# Patient Record
Sex: FEMALE | Race: BLACK OR AFRICAN AMERICAN | Employment: FULL TIME | URBAN - METROPOLITAN AREA
[De-identification: names, ages, dates, MRNs, and addresses within clinical notes are randomized per-mention and may not be internally consistent; named-entity substitution may affect disease eponyms.]

---

## 2017-10-16 PROBLEM — R07.9 CHEST PAIN: Status: ACTIVE | Noted: 2017-10-16

## 2017-10-16 PROBLEM — R07.9 CHEST PAIN: Status: RESOLVED | Noted: 2017-10-16 | Resolved: 2017-10-16

## 2017-10-17 PROBLEM — R00.2 PALPITATIONS: Status: ACTIVE | Noted: 2017-10-17

## 2017-11-06 PROBLEM — I49.3 SYMPTOMATIC PVCS: Status: ACTIVE | Noted: 2017-11-06

## 2017-11-06 PROBLEM — R00.2 PALPITATIONS: Status: RESOLVED | Noted: 2017-10-17 | Resolved: 2017-11-06

## 2018-05-03 ENCOUNTER — HOSPITAL ENCOUNTER (OUTPATIENT)
Dept: CT IMAGING | Age: 23
Discharge: HOME | End: 2018-05-03
Attending: FAMILY MEDICINE
Payer: COMMERCIAL

## 2018-05-03 DIAGNOSIS — R07.9 CHEST PAIN, UNSPECIFIED TYPE: ICD-10-CM

## 2018-05-03 DIAGNOSIS — R79.89 D-DIMER, ELEVATED: ICD-10-CM

## 2018-05-03 PROCEDURE — 71260 CT THORAX DX C+: CPT

## 2018-05-03 PROCEDURE — 74011636320 HC RX REV CODE- 636/320: Performed by: FAMILY MEDICINE

## 2018-05-03 PROCEDURE — 74011000258 HC RX REV CODE- 258: Performed by: FAMILY MEDICINE

## 2018-05-03 RX ORDER — SODIUM CHLORIDE 0.9 % (FLUSH) 0.9 %
10 SYRINGE (ML) INJECTION
Status: COMPLETED | OUTPATIENT
Start: 2018-05-03 | End: 2018-05-03

## 2018-05-03 RX ADMIN — Medication 10 ML: at 10:42

## 2018-05-03 RX ADMIN — IOPAMIDOL 100 ML: 755 INJECTION, SOLUTION INTRAVENOUS at 10:41

## 2018-05-03 RX ADMIN — SODIUM CHLORIDE 100 ML: 900 INJECTION, SOLUTION INTRAVENOUS at 10:41

## 2018-05-04 PROBLEM — R07.9 CHEST PAIN AT REST: Status: ACTIVE | Noted: 2018-05-04

## 2019-02-20 ENCOUNTER — HOSPITAL ENCOUNTER (OUTPATIENT)
Dept: PHYSICAL THERAPY | Age: 24
Discharge: HOME | End: 2019-02-20
Attending: FAMILY MEDICINE
Payer: COMMERCIAL

## 2019-02-20 DIAGNOSIS — S13.4XXD WHIPLASH INJURY TO NECK, SUBSEQUENT ENCOUNTER: ICD-10-CM

## 2019-02-20 PROCEDURE — 97162 PT EVAL MOD COMPLEX 30 MIN: CPT

## 2019-02-20 PROCEDURE — 97110 THERAPEUTIC EXERCISES: CPT
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
## 2019-02-25 ENCOUNTER — HOSPITAL ENCOUNTER (OUTPATIENT)
Dept: PHYSICAL THERAPY | Age: 24
Discharge: HOME | End: 2019-02-25
Attending: FAMILY MEDICINE
Payer: COMMERCIAL

## 2019-02-25 PROCEDURE — 97140 MANUAL THERAPY 1/> REGIONS: CPT

## 2019-02-25 PROCEDURE — 97110 THERAPEUTIC EXERCISES: CPT

## 2019-02-25 NOTE — PROGRESS NOTES
Chato Orozco  : 1995  Payor: Seda Hernandez / Plan: [facility] / Product Type: PPO /  [address]. [address] [place] [address], [facility], [address] [place] [address] Road  [phone]   [phone]          OUTPATIENT PHYSICAL THERAPY: Daily Treatment Note 2019 Visit Count:  2    Pre-treatment Symptoms/Complaints:  Neck feels much better doing ex at home and they are helping. Pain: Initial:   4/10 Post Session:  3/10   Medications Last Reviewed:  See initial evaluation  Updated Objective Findings:  AROM: cervical flex/ext 70/60, RSB/LSB 40/50     TREATMENT:   THERAPEUTIC EXERCISE: (30 minutes):  Exercises per grid below to improve mobility, strength and balance. Required minimal visual, verbal and manual cues to promote proper body alignment and promote proper body posture. Progressed resistance and complexity of movement as indicated. Date:  19 Date:  19 Date:     Activity/Exercise Parameters Parameters Parameters   Patient education Treatment rational, expectations, HEP Progression of HEP    Supine ball AROM rot/flex/ext 20 x ea 20 x ea    Supine AROM with laser feed back Rotation, flex/ext 10 ea In standing with maps on wall 10 min    Seated rot/lateral bending/ shrugs HEP Shrugs 10 x 3#, lateral bending 10 x, flex ext 10 x    Lower trunk rotation  20 x    Single knee to chest  5 x ea    Quadriped rocking  20 x    Cat camel   10 x    Seated lumbar flexion chair stretch  10 x                MANUAL THERAPY: (15 minutes): Joint mobilization was utilized and necessary because of the patient's restricted joint motion. Including manual traction, SOR, lateral glides to cervical spine grade 3. [employer] Portal  Treatment/Session Summary:    · Response to Treatment:  no increase in pain.   · Communication/Consultation:  progression of HEP  · Equipment provided today:  None today  Recommendations/Intent for next treatment session: Next visit will focus on active range of motion. Treatment Plan of Care Effective Dates: 2/25/2019   TO 3/22/2019 (30 days).   Frequency/Duration: 2 times a week for 30 Days  Total Treatment Billable Duration:  40  PT Patient Time In/Time Out  Time In: 1306  Time Out: [address], RT    Future Appointments   Date Time Provider Shukri Perez   2/27/2019 11:15 AM Sandeep Ellington, RT [facility]   3/4/2019 10:15 AM Sandeep Ellington, RT [facility]   3/6/2019 10:30 AM PapSandeep barrios, RT [facility]   3/11/2019 10:15 AM Sandeep Ellington, RT [facility]   3/13/2019 10:30 AM Sandeep Ellington, RT [facility]   3/18/2019 10:30 AM Sandeep Ellington, RT [facility]   3/20/2019 10:30 AM Sandeep Ellington, RT [facility]   3/25/2019 10:30 AM Beverly Ellington, RT [facility]   3/27/2019 10:30 AM Sandeep Ellington, RT [facility]

## 2019-02-27 ENCOUNTER — APPOINTMENT (OUTPATIENT)
Dept: PHYSICAL THERAPY | Age: 24
End: 2019-02-27
Attending: FAMILY MEDICINE
Payer: COMMERCIAL

## 2019-03-04 ENCOUNTER — HOSPITAL ENCOUNTER (OUTPATIENT)
Dept: PHYSICAL THERAPY | Age: 24
Discharge: HOME | End: 2019-03-04
Attending: FAMILY MEDICINE
Payer: COMMERCIAL

## 2019-03-04 PROCEDURE — 97110 THERAPEUTIC EXERCISES: CPT

## 2019-03-04 PROCEDURE — 97140 MANUAL THERAPY 1/> REGIONS: CPT

## 2019-03-04 NOTE — PROGRESS NOTES
Reilly French  : 1995  Payor: Zuly Hitchcock / Plan: [facility] / Product Type: PPO /  [address], [place], [address] [place] [address]  [phone]   [phone]          OUTPATIENT PHYSICAL THERAPY: Daily Treatment Note 3/4/2019 Visit Count:  3    Pre-treatment Symptoms/Complaints:has had a headache for a week, cant think what makes it worse  Pain: Initial:   5/10 Post Session:  3/10   Medications Last Reviewed:  See initial evaluation  Updated Objective Findings:  AROM: cervical flex/ext 65/57, RSB/LSB 50/50     TREATMENT:   THERAPEUTIC EXERCISE: 15 minutes):  Exercises per grid below to improve mobility, strength and balance. Required minimal visual, verbal and manual cues to promote proper body alignment and promote proper body posture. Progressed resistance and complexity of movement as indicated. Date:  19 Date:  19 Date:  3/4/19   Activity/Exercise Parameters Parameters Parameters   Patient education Treatment rational, expectations, HEP Progression of HEP HEP   Supine ball AROM rot/flex/ext 20 x ea 20 x ea 20 x ea   Supine AROM with laser feed back Rotation, flex/ext 10 ea In standing with maps on wall 10 min    Seated rot/lateral bending/ shrugs HEP Shrugs 10 x 3#, lateral bending 10 x, flex ext 10 x    Lower trunk rotation  20 x    Single knee to chest  5 x ea    Quadriped rocking  20 x    Cat camel   10 x    Seated lumbar flexion chair stretch  10 x    Chin tucks   Supine, sitting 20 x   Flexion stretch for headache   5 min         MANUAL THERAPY: (30 minutes): Joint mobilization was utilized and necessary because of the patient's restricted joint motion. Including manual traction, SOR, lateral glides to cervical spine grade 3. [employer] Portal  Treatment/Session Summary:    · Response to Treatment:  no increase in pain.   · Communication/Consultation:  progression of HEP  · Equipment provided today:  None today  Recommendations/Intent for next treatment session: Next visit will focus on active range of motion. Treatment Plan of Care Effective Dates: 3/4/2019   TO 3/22/2019 (30 days).   Frequency/Duration: 2 times a week for 30 Days  Total Treatment Billable Duration:  45  PT Patient Time In/Time Out  Time In: 1020  Time Out: Tam[address], RT    Future Appointments   Date Time Provider Shukri Perez   3/6/2019 10:30 AM Elfego Ellington, RT [facility]   3/11/2019 10:15 AM Elfego Ellington, RT [facility]   3/13/2019 10:30 AM Elfego Ellington, RT [facility]   3/18/2019 10:30 AM [facility]fego Ellington, [facility]   3/20/2019 10:30 AM Elfego Ellington, RT [facility]   3/25/2019 10:30 AM Tramaine Ellington, RT [facility]   3/27/2019 10:30 AM Elfego Ellington, RT [facility]

## 2019-03-06 ENCOUNTER — HOSPITAL ENCOUNTER (OUTPATIENT)
Dept: PHYSICAL THERAPY | Age: 24
Discharge: HOME | End: 2019-03-06
Attending: FAMILY MEDICINE
Payer: COMMERCIAL

## 2019-03-06 PROCEDURE — 97140 MANUAL THERAPY 1/> REGIONS: CPT

## 2019-03-06 PROCEDURE — 97110 THERAPEUTIC EXERCISES: CPT

## 2019-03-06 NOTE — PROGRESS NOTES
Chato Orozco  : 1995  Payor: Seda Hernandez / Plan: [facility] / Product Type: PPO /  [address]. [address] [place] [address], [facility], [address] [place] [address] Road  [phone]   [phone]          OUTPATIENT PHYSICAL THERAPY: Daily Treatment Note 3/6/2019 Visit Count:  4    Pre-treatment Symptoms/Complaints:no back pain, minimal neck pain, still having headaches but not as bad. Pain: Initial:   3/10 Post Session:  1/10   Medications Last Reviewed:  See initial evaluation  Updated Objective Findings:  AROM: cervical flex/ext 65/57, RSB/LSB 50/50     TREATMENT:   THERAPEUTIC EXERCISE: 15 minutes):  Exercises per grid below to improve mobility, strength and balance. Required minimal visual, verbal and manual cues to promote proper body alignment and promote proper body posture. Progressed resistance and complexity of movement as indicated. Date:  19 Date:  19 Date:  3/4/19 Date   3/6/19   Activity/Exercise Parameters Parameters Parameters parameters   Patient education Treatment rational, expectations, HEP Progression of HEP HEP HEP   Supine ball AROM rot/flex/ext 20 x ea 20 x ea 20 x ea 20 x ea    Supine AROM with laser feed back Rotation, flex/ext 10 ea In standing with maps on wall 10 min     Seated rot/lateral bending/ shrugs HEP Shrugs 10 x 3#, lateral bending 10 x, flex ext 10 x  Shrugs 2 x 10   Lower trunk rotation  20 x     Single knee to chest  5 x ea     Quadriped rocking  20 x     Cat camel   10 x     Seated lumbar flexion chair stretch  10 x     Chin tucks   Supine, sitting 20 x 20 x ea position    Flexion stretch for headache   5 min 5 min          MANUAL THERAPY: (30 minutes): Joint mobilization was utilized and necessary because of the patient's restricted joint motion. Including manual traction, SOR, lateral glides to cervical spine grade 3. PA glides in prone. Focus on C6-7 which were painful.  Mid thoracic spine hypomobile but not painful    [employer] Portal  Treatment/Session Summary:    · Response to Treatment:  no increase in pain. · Communication/Consultation:  progression of HEP  · Equipment provided today:  None today  Recommendations/Intent for next treatment session: Next visit will focus on active range of motion. Treatment Plan of Care Effective Dates: 3/6/2019   TO 3/22/2019 (30 days).   Frequency/Duration: 2 times a week for 30 Days  Total Treatment Billable Duration:  45  PT Patient Time In/Time Out  Time In: 1033  Time Out: [address], RT    Future Appointments   Date Time Provider Shukri Perez   3/11/2019  1:45 PM Cloitlde Ellington, RT [facility]   3/13/2019  1:45 PM Clotilde Ellington, RT [facility]   3/18/2019 11:15 AM Clotilde Ellington, RT [facility]   3/20/2019 11:15 AM Clotilde Ellington, RT [facility]   3/25/2019  1:45 PM Ginger Ellington, RT [facility]   3/27/2019  1:45 PM Clotilde Ellington, RT [facility]

## 2019-03-11 ENCOUNTER — APPOINTMENT (OUTPATIENT)
Dept: PHYSICAL THERAPY | Age: 24
End: 2019-03-11
Attending: FAMILY MEDICINE
Payer: COMMERCIAL

## 2019-03-20 ENCOUNTER — APPOINTMENT (OUTPATIENT)
Dept: PHYSICAL THERAPY | Age: 24
End: 2019-03-20
Attending: FAMILY MEDICINE
Payer: COMMERCIAL

## 2019-03-25 ENCOUNTER — HOSPITAL ENCOUNTER (OUTPATIENT)
Dept: PHYSICAL THERAPY | Age: 24
End: 2019-03-25
Attending: FAMILY MEDICINE
Payer: COMMERCIAL

## 2019-03-27 ENCOUNTER — APPOINTMENT (OUTPATIENT)
Dept: PHYSICAL THERAPY | Age: 24
End: 2019-03-27
Attending: FAMILY MEDICINE
Payer: COMMERCIAL
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
## 2019-05-23 ENCOUNTER — HOSPITAL ENCOUNTER (OUTPATIENT)
Dept: OCCUPATIONAL MEDICINE | Age: 24
Discharge: HOME | End: 2019-05-23

## 2019-05-23 ENCOUNTER — HOSPITAL ENCOUNTER (OUTPATIENT)
Dept: GENERAL RADIOLOGY | Age: 24
Discharge: HOME | End: 2019-05-23
Attending: FAMILY MEDICINE

## 2019-05-23 DIAGNOSIS — T14.90XA INJURY: ICD-10-CM

## 2019-05-23 PROCEDURE — 73620 X-RAY EXAM OF FOOT: CPT

## 2019-10-17 ENCOUNTER — HOSPITAL ENCOUNTER (OUTPATIENT)
Dept: ULTRASOUND IMAGING | Age: 24
Discharge: HOME | End: 2019-10-17
Attending: FAMILY MEDICINE

## 2019-10-17 DIAGNOSIS — N92.6 IRREGULAR MENSES: ICD-10-CM

## 2019-10-17 DIAGNOSIS — R10.31 RLQ ABDOMINAL PAIN: ICD-10-CM

## 2019-10-28 ENCOUNTER — HOSPITAL ENCOUNTER (OUTPATIENT)
Dept: CT IMAGING | Age: 24
Discharge: HOME | End: 2019-10-28
Attending: FAMILY MEDICINE

## 2019-10-28 DIAGNOSIS — R10.31 RLQ ABDOMINAL PAIN: ICD-10-CM

## 2019-10-28 RX ORDER — SODIUM CHLORIDE 0.9 % (FLUSH) 0.9 %
10 SYRINGE (ML) INJECTION
Status: COMPLETED | OUTPATIENT
Start: 2019-10-28 | End: 2019-10-28

## 2019-10-28 RX ADMIN — Medication 10 ML: at 14:05

## 2020-02-26 ENCOUNTER — ANESTHESIA EVENT (OUTPATIENT)
Dept: SURGERY | Age: 25
End: 2020-02-26
Payer: OTHER MISCELLANEOUS

## 2020-02-27 ENCOUNTER — APPOINTMENT (OUTPATIENT)
Dept: GENERAL RADIOLOGY | Age: 25
End: 2020-02-27
Attending: ORTHOPAEDIC SURGERY
Payer: OTHER MISCELLANEOUS

## 2020-02-27 ENCOUNTER — HOSPITAL ENCOUNTER (OUTPATIENT)
Age: 25
Setting detail: OUTPATIENT SURGERY
Discharge: HOME | End: 2020-02-27
Attending: ORTHOPAEDIC SURGERY | Admitting: ORTHOPAEDIC SURGERY
Payer: OTHER MISCELLANEOUS

## 2020-02-27 ENCOUNTER — ANESTHESIA (OUTPATIENT)
Dept: SURGERY | Age: 25
End: 2020-02-27
Payer: OTHER MISCELLANEOUS

## 2020-02-27 VITALS
RESPIRATION RATE: 16 BRPM | BODY MASS INDEX: 23.78 KG/M2 | DIASTOLIC BLOOD PRESSURE: 60 MMHG | HEART RATE: 84 BPM | SYSTOLIC BLOOD PRESSURE: 104 MMHG | WEIGHT: 130 LBS | TEMPERATURE: 98 F | OXYGEN SATURATION: 98 %

## 2020-02-27 LAB — HCG UR QL: NEGATIVE

## 2020-02-27 PROCEDURE — 77030002916 HC SUT ETHLN J&J -A: Performed by: ORTHOPAEDIC SURGERY

## 2020-02-27 PROCEDURE — 81025 URINE PREGNANCY TEST: CPT

## 2020-02-27 PROCEDURE — 76010000160 HC OR TIME 0.5 TO 1 HR INTENSV-TIER 1: Performed by: ORTHOPAEDIC SURGERY

## 2020-02-27 PROCEDURE — C1713 ANCHOR/SCREW BN/BN,TIS/BN: HCPCS | Performed by: ORTHOPAEDIC SURGERY

## 2020-02-27 PROCEDURE — 77030025281 HC SPLNT ORTHGLS 1 BSNM -B: Performed by: ORTHOPAEDIC SURGERY

## 2020-02-27 PROCEDURE — 74011250636 HC RX REV CODE- 250/636: Performed by: ANESTHESIOLOGY

## 2020-02-27 PROCEDURE — 76942 ECHO GUIDE FOR BIOPSY: CPT | Performed by: ORTHOPAEDIC SURGERY

## 2020-02-27 PROCEDURE — 74011000250 HC RX REV CODE- 250: Performed by: NURSE ANESTHETIST, CERTIFIED REGISTERED

## 2020-02-27 PROCEDURE — 76060000032 HC ANESTHESIA 0.5 TO 1 HR: Performed by: ORTHOPAEDIC SURGERY

## 2020-02-27 PROCEDURE — 76210000020 HC REC RM PH II FIRST 0.5 HR: Performed by: ORTHOPAEDIC SURGERY

## 2020-02-27 PROCEDURE — 74011250636 HC RX REV CODE- 250/636: Performed by: NURSE ANESTHETIST, CERTIFIED REGISTERED

## 2020-02-27 PROCEDURE — 77030031139 HC SUT VCRL2 J&J -A: Performed by: ORTHOPAEDIC SURGERY

## 2020-02-27 PROCEDURE — 77030003602 HC NDL NRV BLK BBMI -B: Performed by: ANESTHESIOLOGY

## 2020-02-27 PROCEDURE — 74011250636 HC RX REV CODE- 250/636: Performed by: ORTHOPAEDIC SURGERY

## 2020-02-27 PROCEDURE — 76210000063 HC OR PH I REC FIRST 0.5 HR: Performed by: ORTHOPAEDIC SURGERY

## 2020-02-27 PROCEDURE — 76010010054 HC POST OP PAIN BLOCK: Performed by: ORTHOPAEDIC SURGERY

## 2020-02-27 PROCEDURE — 77030018836 HC SOL IRR NACL ICUM -A: Performed by: ORTHOPAEDIC SURGERY

## 2020-02-27 PROCEDURE — 77030000032 HC CUF TRNQT ZIMM -B: Performed by: ORTHOPAEDIC SURGERY

## 2020-02-27 DEVICE — TWINFIX ULTRA 4.5 MM POLY L LACTIC                                    ACID-HA SUTURE ANCHOR WITH TWO NO.2                                    ULTRABRAID SUTURES BLUE,                                    BLUE-COBRAID WITH NEEDLES
Type: IMPLANTABLE DEVICE | Site: FOOT | Status: FUNCTIONAL
Brand: TWINFIX

## 2020-02-27 RX ORDER — OXYCODONE HYDROCHLORIDE 5 MG/1
5 TABLET ORAL
Status: DISCONTINUED | OUTPATIENT
Start: 2020-02-27 | End: 2020-02-27 | Stop reason: HOSPADM

## 2020-02-27 RX ORDER — MIDAZOLAM HYDROCHLORIDE 1 MG/ML
2 INJECTION, SOLUTION INTRAMUSCULAR; INTRAVENOUS ONCE
Status: COMPLETED | OUTPATIENT
Start: 2020-02-27 | End: 2020-02-27

## 2020-02-27 RX ORDER — HYDROMORPHONE HYDROCHLORIDE 2 MG/ML
0.5 INJECTION, SOLUTION INTRAMUSCULAR; INTRAVENOUS; SUBCUTANEOUS
Status: DISCONTINUED | OUTPATIENT
Start: 2020-02-27 | End: 2020-02-27 | Stop reason: HOSPADM

## 2020-02-27 RX ORDER — SODIUM CHLORIDE 0.9 % (FLUSH) 0.9 %
5-40 SYRINGE (ML) INJECTION AS NEEDED
Status: DISCONTINUED | OUTPATIENT
Start: 2020-02-27 | End: 2020-02-27 | Stop reason: HOSPADM

## 2020-02-27 RX ORDER — ONDANSETRON 2 MG/ML
INJECTION INTRAMUSCULAR; INTRAVENOUS AS NEEDED
Status: DISCONTINUED | OUTPATIENT
Start: 2020-02-27 | End: 2020-02-27 | Stop reason: HOSPADM

## 2020-02-27 RX ORDER — LIDOCAINE HYDROCHLORIDE 20 MG/ML
INJECTION, SOLUTION EPIDURAL; INFILTRATION; INTRACAUDAL; PERINEURAL AS NEEDED
Status: DISCONTINUED | OUTPATIENT
Start: 2020-02-27 | End: 2020-02-27 | Stop reason: HOSPADM

## 2020-02-27 RX ORDER — SODIUM CHLORIDE, SODIUM LACTATE, POTASSIUM CHLORIDE, CALCIUM CHLORIDE 600; 310; 30; 20 MG/100ML; MG/100ML; MG/100ML; MG/100ML
75 INJECTION, SOLUTION INTRAVENOUS CONTINUOUS
Status: DISCONTINUED | OUTPATIENT
Start: 2020-02-27 | End: 2020-02-27 | Stop reason: HOSPADM

## 2020-02-27 RX ORDER — ALBUTEROL SULFATE 0.83 MG/ML
2.5 SOLUTION RESPIRATORY (INHALATION) AS NEEDED
Status: DISCONTINUED | OUTPATIENT
Start: 2020-02-27 | End: 2020-02-27 | Stop reason: HOSPADM

## 2020-02-27 RX ORDER — MIDAZOLAM HYDROCHLORIDE 1 MG/ML
2 INJECTION, SOLUTION INTRAMUSCULAR; INTRAVENOUS
Status: DISCONTINUED | OUTPATIENT
Start: 2020-02-27 | End: 2020-02-27 | Stop reason: HOSPADM

## 2020-02-27 RX ORDER — ROPIVACAINE HYDROCHLORIDE 5 MG/ML
INJECTION, SOLUTION EPIDURAL; INFILTRATION; PERINEURAL
Status: COMPLETED | OUTPATIENT
Start: 2020-02-27 | End: 2020-02-27

## 2020-02-27 RX ORDER — SODIUM CHLORIDE 0.9 % (FLUSH) 0.9 %
5-40 SYRINGE (ML) INJECTION EVERY 8 HOURS
Status: DISCONTINUED | OUTPATIENT
Start: 2020-02-27 | End: 2020-02-27 | Stop reason: HOSPADM

## 2020-02-27 RX ORDER — LIDOCAINE HYDROCHLORIDE 10 MG/ML
0.1 INJECTION INFILTRATION; PERINEURAL AS NEEDED
Status: DISCONTINUED | OUTPATIENT
Start: 2020-02-27 | End: 2020-02-27 | Stop reason: HOSPADM

## 2020-02-27 RX ORDER — CEFAZOLIN SODIUM/WATER 2 G/20 ML
2 SYRINGE (ML) INTRAVENOUS ONCE
Status: COMPLETED | OUTPATIENT
Start: 2020-02-27 | End: 2020-02-27

## 2020-02-27 RX ORDER — SODIUM CHLORIDE, SODIUM LACTATE, POTASSIUM CHLORIDE, CALCIUM CHLORIDE 600; 310; 30; 20 MG/100ML; MG/100ML; MG/100ML; MG/100ML
50 INJECTION, SOLUTION INTRAVENOUS CONTINUOUS
Status: DISCONTINUED | OUTPATIENT
Start: 2020-02-27 | End: 2020-02-27 | Stop reason: HOSPADM

## 2020-02-27 RX ORDER — FENTANYL CITRATE 50 UG/ML
100 INJECTION, SOLUTION INTRAMUSCULAR; INTRAVENOUS ONCE
Status: COMPLETED | OUTPATIENT
Start: 2020-02-27 | End: 2020-02-27

## 2020-02-27 RX ORDER — PROPOFOL 10 MG/ML
INJECTION, EMULSION INTRAVENOUS
Status: DISCONTINUED | OUTPATIENT
Start: 2020-02-27 | End: 2020-02-27 | Stop reason: HOSPADM

## 2020-02-27 RX ORDER — CELECOXIB 200 MG/1
200 CAPSULE ORAL
Status: DISCONTINUED | OUTPATIENT
Start: 2020-02-27 | End: 2020-02-27 | Stop reason: HOSPADM

## 2020-02-27 RX ADMIN — ROPIVACAINE HYDROCHLORIDE 15 ML: 5 INJECTION, SOLUTION EPIDURAL; INFILTRATION; PERINEURAL at 07:43

## 2020-02-27 RX ADMIN — LIDOCAINE HYDROCHLORIDE 40 MG: 20 INJECTION, SOLUTION EPIDURAL; INFILTRATION; INTRACAUDAL; PERINEURAL at 08:21

## 2020-02-27 RX ADMIN — FENTANYL CITRATE 100 MCG: 50 INJECTION, SOLUTION INTRAMUSCULAR; INTRAVENOUS at 07:37

## 2020-02-27 RX ADMIN — SODIUM CHLORIDE, SODIUM LACTATE, POTASSIUM CHLORIDE, AND CALCIUM CHLORIDE 75 ML/HR: 600; 310; 30; 20 INJECTION, SOLUTION INTRAVENOUS at 07:47

## 2020-02-27 RX ADMIN — ONDANSETRON 4 MG: 2 INJECTION INTRAMUSCULAR; INTRAVENOUS at 08:36

## 2020-02-27 RX ADMIN — SODIUM CHLORIDE, SODIUM LACTATE, POTASSIUM CHLORIDE, AND CALCIUM CHLORIDE: 600; 310; 30; 20 INJECTION, SOLUTION INTRAVENOUS at 08:18

## 2020-02-27 RX ADMIN — Medication 2 G: at 08:25

## 2020-02-27 RX ADMIN — ROPIVACAINE HYDROCHLORIDE 30 ML: 150 INJECTION, SOLUTION EPIDURAL; INFILTRATION; PERINEURAL at 07:41

## 2020-02-27 RX ADMIN — MIDAZOLAM 2 MG: 1 INJECTION INTRAMUSCULAR; INTRAVENOUS at 07:37

## 2020-02-27 RX ADMIN — PROPOFOL 200 MCG/KG/MIN: 10 INJECTION, EMULSION INTRAVENOUS at 08:21

## 2020-02-27 NOTE — ANESTHESIA PREPROCEDURE EVALUATION
Relevant Problems   No relevant active problems       Anesthetic History   No history of anesthetic complications            Review of Systems / Medical History  Patient summary reviewed, nursing notes reviewed and pertinent labs reviewed    Pulmonary  Within defined limits                 Neuro/Psych   Within defined limits           Cardiovascular            Dysrhythmias : PVC      Exercise tolerance: >4 METS  Comments: Stress Echo 2018- EF 55%, no ST changes no RWMA, normal stress scho   GI/Hepatic/Renal     GERD: well controlled           Endo/Other  Within defined limits           Other Findings            Physical Exam    Airway  Mallampati: II    Neck ROM: normal range of motion   Mouth opening: Normal     Cardiovascular  Regular rate and rhythm,  S1 and S2 normal,  no murmur, click, rub, or gallop             Dental  No notable dental hx       Pulmonary  Breath sounds clear to auscultation               Abdominal         Other Findings            Anesthetic Plan    ASA: 2  Anesthesia type: total IV anesthesia          Induction: Intravenous  Anesthetic plan and risks discussed with: Patient, Mother and Family

## 2020-02-27 NOTE — PROGRESS NOTES
's Pre-op visit requested by patient. Conveyed care and concern for patient and family. Offered prayer as requested for patient, family, and staff.     Shay Huntley MDiv, BS  Board Certified

## 2020-02-27 NOTE — DISCHARGE INSTRUCTIONS
INSTRUCTIONS FOLLOWING FOOT SURGERY    ACTIVITY  Elevate foot (feet) for 48 hours. Use crutches as directed by your doctor. No weight bearing on operative foot. Weight bearing as tolerated in post op shoe. DIET  Clear liquids until no nausea or vomiting; then light diet for the first day. Advance to regular diet on second day, unless your doctor orders otherwise. PAIN  Take pain medications as directed by your doctor. Call your doctor if pain is NOT relieved by medication. DO NOT take aspirin or blood thinners until directed by your doctor. DRESSING CARE      FOLLOW-UP PHONE CALLS  Calls will be made by nursing staff. If you have any problems, call your doctor as needed. CALL YOUR DOCTOR IF YOU HAVE  Excessive bleeding that does not stop after holding mild pressure over the area. Temperature of 101 degrees or above. Redness, excessive swelling or bruising, and/or green or yellow, smelly discharge from incision. Loss of sensation - cold, white or blue toes. AFTER ANESTHESIA  For the first 24 hours and while taking narcotics for pain: DO NOT Drive, Drink Alcoholic beverages, or make important Decisions. Be aware of dizziness following anesthesia and while taking pain medication.     OTHER INSTRUCTIONS    APPOINTMENT DATE/TIME_________________________________    Vianey Tam DOCTOR'S PHONE NUMBER__________________________

## 2020-02-27 NOTE — ANESTHESIA POSTPROCEDURE EVALUATION
Procedure(s):  LEFT FOOT KIDNER PROCEDURE.    total IV anesthesia, regional    Anesthesia Post Evaluation      Multimodal analgesia: multimodal analgesia used between 6 hours prior to anesthesia start to PACU discharge  Patient location during evaluation: PACU  Patient participation: complete - patient participated  Level of consciousness: responsive to verbal stimuli  Pain management: adequate  Airway patency: patent  Anesthetic complications: no  Cardiovascular status: acceptable  Respiratory status: acceptable, spontaneous ventilation and nonlabored ventilation  Hydration status: acceptable  Post anesthesia nausea and vomiting:  none      Vitals Value Taken Time   /59 2/27/2020  9:24 AM   Temp     Pulse 68 2/27/2020  9:26 AM   Resp     SpO2 99 % 2/27/2020  9:26 AM   Vitals shown include unvalidated device data.

## 2020-02-27 NOTE — ANESTHESIA PROCEDURE NOTES
Peripheral Block    Start time: 2/27/2020 7:37 AM  End time: 2/27/2020 7:41 AM  Performed by: Zaira Mendez MD  Authorized by: Zaira Mendez MD       Pre-procedure:    Indications: at surgeon's request and post-op pain management    Preanesthetic Checklist: patient identified, risks and benefits discussed, site marked, timeout performed, anesthesia consent given and patient being monitored    Timeout Time: 07:37          Block Type:   Block Type:  Popliteal  Laterality:  Left  Monitoring:  Responsive to questions, continuous pulse ox, oxygen, frequent vital sign checks and heart rate  Injection Technique:  Single shot  Procedures: ultrasound guided and nerve stimulator    Patient Position: right lateral decubitus  Prep: chlorhexidine    Location:  Lower thigh  Needle Type:  Stimuplex  Needle Gauge:  21 G  Needle Localization:  Ultrasound guidance and nerve stimulator  Motor Response: minimal motor response >0.4 mA      Assessment:  Number of attempts:  1  Injection Assessment:  Incremental injection every 5 mL, negative aspiration for CSF, no paresthesia, ultrasound image on chart, no intravascular symptoms, negative aspiration for blood and local visualized surrounding nerve on ultrasound  Patient tolerance:  Patient tolerated the procedure well with no immediate complications

## 2020-02-27 NOTE — OP NOTES
[facility]  OPERATIVE REPORT    Name:  Radha Osei  MR#:  [id number]  :  1995  ACCOUNT #:  [de-identified]  DATE OF SERVICE:  2020    PREOPERATIVE DIAGNOSIS:   Left painful accessory navicular. POSTOPERATIVE DIAGNOSIS:   Left painful accessory navicular. PROCEDURE PERFORMED:  Left accessory navicular excision with advancement of posterior tibial tendon, Kidner procedure 80777. SURGEON:  Franci Greenfield III, MD      ANESTHESIA:  Popliteal block with monitored anesthesia care. ESTIMATED BLOOD LOSS:  Minimal.    TOURNIQUET TIME:  17 minutes at 250 mmHg. ANTIBIOTIC PROPHYLAXIS:  Ancef given prior to the procedure. INDICATION FOR PROCEDURE:  This is a 49-year-old female with symptomatic left enlarged accessory navicular who has failed conservative therapy and desires surgical treatment. Risks and benefits of the procedure including, but not limited to risks of anesthetic complications, myocardial infarction, stroke, death, and surgical complications including damage to nerves and blood vessels, risk of infection, incomplete pain relief, and need for additional surgery were discussed with the patient. She understands the risks and wishes to proceed with surgery at this time. PROCEDURE:  The patient's operative site was marked with indelible ink in the preoperative holding area. A  block was placed by the Department of Anesthesia. The patient was brought to the operating room and placed supine. After preoperative surgical time-out, the left lower extremity was identified as the surgical site, prepped and draped in standard sterile fashion with ChloraPrep solution. Medial approach to the posterior tibial tendon at the navicular was opened at that time. The accessory navicular was excised sharply using #15 blade with attachment of the posterior tibial tendon and confirmed to be adequately removed under image intensification.   A Del Cid an [employer] was then placed into the native navicular under fluoroscopic guidance and the posterior tibial tendon was then advanced up to the native navicular without difficulty. The wounds were irrigated and closed using Vicryl in the epitenon followed by Monocryl and nylon sutures on the skin. A sterile dressing was then applied followed by a well-padded posterior splint. Anesthesia was discontinued. The patient was transferred back to the recovery bed and taken to the recovery room in satisfactory condition. She appeared to tolerate the procedure well. There were no apparent general or anesthetic complications. All needle and sponge counts were correct.           MD SHARONDA Lauren Mt/SINGH_IPSON_T/[email or web address]_IPTDS_PN  D:  02/27/2020 9:10  T:  02/27/2020 18:51  JOB #:  [id number]

## 2020-02-27 NOTE — ANESTHESIA PROCEDURE NOTES
Peripheral Block    Start time: 2/27/2020 7:42 AM  End time: 2/27/2020 7:43 AM  Performed by: Mateo Esparza MD  Authorized by: Mateo Esparza MD       Pre-procedure: Indications: at surgeon's request and post-op pain management    Preanesthetic Checklist: patient identified, risks and benefits discussed, site marked, timeout performed, anesthesia consent given and patient being monitored    Timeout Time: 07:42          Block Type:   Block Type:   Adductor canal  Laterality:  Left  Monitoring:  Responsive to questions, continuous pulse ox, oxygen, frequent vital sign checks and heart rate  Injection Technique:  Single shot  Procedures: ultrasound guided    Patient Position: supine  Prep: chlorhexidine    Location:  Upper thigh  Needle Type:  Stimuplex  Needle Gauge:  21 G  Needle Localization:  Ultrasound guidance    Assessment:  Number of attempts:  1  Injection Assessment:  Incremental injection every 5 mL, negative aspiration for CSF, no paresthesia, ultrasound image on chart, no intravascular symptoms, negative aspiration for blood and local visualized surrounding nerve on ultrasound  Patient tolerance:  Patient tolerated the procedure well with no immediate complications

## 2020-10-10 ENCOUNTER — ANESTHESIA EVENT (OUTPATIENT)
Dept: SURGERY | Age: 25
End: 2020-10-10
Payer: OTHER MISCELLANEOUS

## 2020-10-10 RX ORDER — SODIUM CHLORIDE, SODIUM LACTATE, POTASSIUM CHLORIDE, CALCIUM CHLORIDE 600; 310; 30; 20 MG/100ML; MG/100ML; MG/100ML; MG/100ML
100 INJECTION, SOLUTION INTRAVENOUS CONTINUOUS
Status: CANCELLED | OUTPATIENT
Start: 2020-10-10 | End: 2020-10-10

## 2020-10-10 RX ORDER — HYDROMORPHONE HYDROCHLORIDE 2 MG/ML
0.5 INJECTION, SOLUTION INTRAMUSCULAR; INTRAVENOUS; SUBCUTANEOUS
Status: CANCELLED | OUTPATIENT
Start: 2020-10-10

## 2020-10-10 RX ORDER — OXYCODONE HYDROCHLORIDE 5 MG/1
10 TABLET ORAL
Status: CANCELLED | OUTPATIENT
Start: 2020-10-10 | End: 2020-10-11

## 2020-10-12 ENCOUNTER — HOSPITAL ENCOUNTER (OUTPATIENT)
Age: 25
Setting detail: OUTPATIENT SURGERY
Discharge: HOME | End: 2020-10-12
Attending: ORTHOPAEDIC SURGERY | Admitting: ORTHOPAEDIC SURGERY
Payer: OTHER MISCELLANEOUS

## 2020-10-12 ENCOUNTER — ANESTHESIA (OUTPATIENT)
Dept: SURGERY | Age: 25
End: 2020-10-12
Payer: OTHER MISCELLANEOUS

## 2020-10-12 VITALS
HEART RATE: 73 BPM | SYSTOLIC BLOOD PRESSURE: 121 MMHG | BODY MASS INDEX: 29.59 KG/M2 | DIASTOLIC BLOOD PRESSURE: 76 MMHG | OXYGEN SATURATION: 97 % | RESPIRATION RATE: 16 BRPM | TEMPERATURE: 97.6 F | WEIGHT: 160.8 LBS | HEIGHT: 62 IN

## 2020-10-12 LAB — HCG UR QL: NEGATIVE

## 2020-10-12 PROCEDURE — 74011250636 HC RX REV CODE- 250/636: Performed by: ANESTHESIOLOGY

## 2020-10-12 PROCEDURE — 77030025281 HC SPLNT ORTHGLS 1 BSNM -B: Performed by: ORTHOPAEDIC SURGERY

## 2020-10-12 PROCEDURE — 76210000020 HC REC RM PH II FIRST 0.5 HR: Performed by: ORTHOPAEDIC SURGERY

## 2020-10-12 PROCEDURE — 77030000032 HC CUF TRNQT ZIMM -B: Performed by: ORTHOPAEDIC SURGERY

## 2020-10-12 PROCEDURE — 76942 ECHO GUIDE FOR BIOPSY: CPT | Performed by: ORTHOPAEDIC SURGERY

## 2020-10-12 PROCEDURE — 76010010054 HC POST OP PAIN BLOCK: Performed by: ORTHOPAEDIC SURGERY

## 2020-10-12 PROCEDURE — 77030002916 HC SUT ETHLN J&J -A: Performed by: ORTHOPAEDIC SURGERY

## 2020-10-12 PROCEDURE — 76210000063 HC OR PH I REC FIRST 0.5 HR: Performed by: ORTHOPAEDIC SURGERY

## 2020-10-12 PROCEDURE — 77030002933 HC SUT MCRYL J&J -A: Performed by: ORTHOPAEDIC SURGERY

## 2020-10-12 PROCEDURE — 77030003602 HC NDL NRV BLK BBMI -B: Performed by: ANESTHESIOLOGY

## 2020-10-12 PROCEDURE — 2709999900 HC NON-CHARGEABLE SUPPLY: Performed by: ORTHOPAEDIC SURGERY

## 2020-10-12 PROCEDURE — 81025 URINE PREGNANCY TEST: CPT

## 2020-10-12 PROCEDURE — C1713 ANCHOR/SCREW BN/BN,TIS/BN: HCPCS | Performed by: ORTHOPAEDIC SURGERY

## 2020-10-12 PROCEDURE — 77030002982 HC SUT POLYSRB J&J -A: Performed by: ORTHOPAEDIC SURGERY

## 2020-10-12 PROCEDURE — 76060000032 HC ANESTHESIA 0.5 TO 1 HR: Performed by: ORTHOPAEDIC SURGERY

## 2020-10-12 PROCEDURE — 74011000250 HC RX REV CODE- 250: Performed by: NURSE ANESTHETIST, CERTIFIED REGISTERED

## 2020-10-12 PROCEDURE — 76010000160 HC OR TIME 0.5 TO 1 HR INTENSV-TIER 1: Performed by: ORTHOPAEDIC SURGERY

## 2020-10-12 PROCEDURE — 74011250636 HC RX REV CODE- 250/636: Performed by: NURSE ANESTHETIST, CERTIFIED REGISTERED

## 2020-10-12 PROCEDURE — 74011250636 HC RX REV CODE- 250/636: Performed by: ORTHOPAEDIC SURGERY

## 2020-10-12 DEVICE — TWINFIX ULTRA 4.5 MM PK SUTURE                                    ANCHOR WITH TWO NO.2 ULTRABRAID                                    SUTURES BLUE, BLUE-COBRAID WITH NEEDLES
Type: IMPLANTABLE DEVICE | Site: FOOT | Status: FUNCTIONAL
Brand: TWINFIX

## 2020-10-12 RX ORDER — ROPIVACAINE HYDROCHLORIDE 5 MG/ML
INJECTION, SOLUTION EPIDURAL; INFILTRATION; PERINEURAL
Status: COMPLETED | OUTPATIENT
Start: 2020-10-12 | End: 2020-10-12

## 2020-10-12 RX ORDER — PROPOFOL 10 MG/ML
INJECTION, EMULSION INTRAVENOUS
Status: DISCONTINUED | OUTPATIENT
Start: 2020-10-12 | End: 2020-10-12 | Stop reason: HOSPADM

## 2020-10-12 RX ORDER — CEFAZOLIN SODIUM/WATER 2 G/20 ML
2 SYRINGE (ML) INTRAVENOUS ONCE
Status: COMPLETED | OUTPATIENT
Start: 2020-10-12 | End: 2020-10-12

## 2020-10-12 RX ORDER — SODIUM CHLORIDE, SODIUM LACTATE, POTASSIUM CHLORIDE, CALCIUM CHLORIDE 600; 310; 30; 20 MG/100ML; MG/100ML; MG/100ML; MG/100ML
100 INJECTION, SOLUTION INTRAVENOUS CONTINUOUS
Status: DISCONTINUED | OUTPATIENT
Start: 2020-10-12 | End: 2020-10-12 | Stop reason: HOSPADM

## 2020-10-12 RX ORDER — FENTANYL CITRATE 50 UG/ML
100 INJECTION, SOLUTION INTRAMUSCULAR; INTRAVENOUS ONCE
Status: COMPLETED | OUTPATIENT
Start: 2020-10-12 | End: 2020-10-12

## 2020-10-12 RX ORDER — DEXAMETHASONE SODIUM PHOSPHATE 4 MG/ML
INJECTION, SOLUTION INTRA-ARTICULAR; INTRALESIONAL; INTRAMUSCULAR; INTRAVENOUS; SOFT TISSUE
Status: COMPLETED | OUTPATIENT
Start: 2020-10-12 | End: 2020-10-12

## 2020-10-12 RX ORDER — PROPOFOL 10 MG/ML
INJECTION, EMULSION INTRAVENOUS AS NEEDED
Status: DISCONTINUED | OUTPATIENT
Start: 2020-10-12 | End: 2020-10-12 | Stop reason: HOSPADM

## 2020-10-12 RX ORDER — SODIUM CHLORIDE 0.9 % (FLUSH) 0.9 %
5-40 SYRINGE (ML) INJECTION AS NEEDED
Status: DISCONTINUED | OUTPATIENT
Start: 2020-10-12 | End: 2020-10-12 | Stop reason: HOSPADM

## 2020-10-12 RX ORDER — MIDAZOLAM HYDROCHLORIDE 1 MG/ML
2 INJECTION, SOLUTION INTRAMUSCULAR; INTRAVENOUS
Status: COMPLETED | OUTPATIENT
Start: 2020-10-12 | End: 2020-10-12

## 2020-10-12 RX ORDER — SODIUM CHLORIDE 0.9 % (FLUSH) 0.9 %
5-40 SYRINGE (ML) INJECTION EVERY 8 HOURS
Status: DISCONTINUED | OUTPATIENT
Start: 2020-10-12 | End: 2020-10-12 | Stop reason: HOSPADM

## 2020-10-12 RX ORDER — LIDOCAINE HYDROCHLORIDE 20 MG/ML
INJECTION, SOLUTION EPIDURAL; INFILTRATION; INTRACAUDAL; PERINEURAL AS NEEDED
Status: DISCONTINUED | OUTPATIENT
Start: 2020-10-12 | End: 2020-10-12 | Stop reason: HOSPADM

## 2020-10-12 RX ORDER — LIDOCAINE HYDROCHLORIDE 10 MG/ML
0.3 INJECTION INFILTRATION; PERINEURAL ONCE
Status: DISCONTINUED | OUTPATIENT
Start: 2020-10-12 | End: 2020-10-12 | Stop reason: HOSPADM

## 2020-10-12 RX ADMIN — Medication 2 G: at 10:19

## 2020-10-12 RX ADMIN — MEPIVACAINE HYDROCHLORIDE 12 ML: 20 INJECTION, SOLUTION EPIDURAL; INFILTRATION at 09:22

## 2020-10-12 RX ADMIN — PROPOFOL 140 MCG/KG/MIN: 10 INJECTION, EMULSION INTRAVENOUS at 10:25

## 2020-10-12 RX ADMIN — FENTANYL CITRATE 100 MCG: 50 INJECTION INTRAMUSCULAR; INTRAVENOUS at 09:17

## 2020-10-12 RX ADMIN — ROPIVACAINE HYDROCHLORIDE 18 ML: 150 INJECTION, SOLUTION EPIDURAL; INFILTRATION; PERINEURAL at 09:22

## 2020-10-12 RX ADMIN — LIDOCAINE HYDROCHLORIDE 70 MG: 20 INJECTION, SOLUTION EPIDURAL; INFILTRATION; INTRACAUDAL; PERINEURAL at 10:25

## 2020-10-12 RX ADMIN — ROPIVACAINE HYDROCHLORIDE 12 ML: 150 INJECTION, SOLUTION EPIDURAL; INFILTRATION; PERINEURAL at 09:24

## 2020-10-12 RX ADMIN — DEXAMETHASONE SODIUM PHOSPHATE 4 MG: 4 INJECTION, SOLUTION INTRAMUSCULAR; INTRAVENOUS at 09:24

## 2020-10-12 RX ADMIN — DEXAMETHASONE SODIUM PHOSPHATE 5 MG: 4 INJECTION, SOLUTION INTRAMUSCULAR; INTRAVENOUS at 09:22

## 2020-10-12 RX ADMIN — MIDAZOLAM 2 MG: 1 INJECTION INTRAMUSCULAR; INTRAVENOUS at 09:17

## 2020-10-12 RX ADMIN — MEPIVACAINE HYDROCHLORIDE 8 ML: 20 INJECTION, SOLUTION EPIDURAL; INFILTRATION at 09:24

## 2020-10-12 RX ADMIN — PROPOFOL 50 MG: 10 INJECTION, EMULSION INTRAVENOUS at 10:25

## 2020-10-12 RX ADMIN — SODIUM CHLORIDE, SODIUM LACTATE, POTASSIUM CHLORIDE, AND CALCIUM CHLORIDE 100 ML/HR: 600; 310; 30; 20 INJECTION, SOLUTION INTRAVENOUS at 08:09

## 2020-10-12 NOTE — ANESTHESIA PREPROCEDURE EVALUATION
Relevant Problems   No relevant active problems       Anesthetic History   No history of anesthetic complications            Review of Systems / Medical History  Patient summary reviewed, nursing notes reviewed and pertinent labs reviewed    Pulmonary  Within defined limits                 Neuro/Psych   Within defined limits           Cardiovascular            Dysrhythmias : PVC      Exercise tolerance: >4 METS  Comments: Stress Echo 2018- EF 55%, no ST changes no RWMA, normal stress scho   GI/Hepatic/Renal     GERD: well controlled           Endo/Other  Within defined limits           Other Findings              Physical Exam    Airway  Mallampati: II  TM Distance: 4 - 6 cm  Neck ROM: normal range of motion   Mouth opening: Normal     Cardiovascular  Regular rate and rhythm,  S1 and S2 normal,  no murmur, click, rub, or gallop             Dental  No notable dental hx       Pulmonary  Breath sounds clear to auscultation               Abdominal         Other Findings            Anesthetic Plan    ASA: 2  Anesthesia type: total IV anesthesia      Post-op pain plan if not by surgeon: peripheral nerve block single    Induction: Intravenous  Anesthetic plan and risks discussed with: Patient and Family      Pop/saph with propofol sedation

## 2020-10-12 NOTE — ANESTHESIA PROCEDURE NOTES
Saphenous block, no ultrasound    Start time: 10/12/2020 9:22 AM  End time: 10/12/2020 9:24 AM  Performed by: Blade Greene MD  Authorized by: Blade Greene MD       Pre-procedure: Indications: at surgeon's request and post-op pain management    Preanesthetic Checklist: patient identified, risks and benefits discussed, site marked, timeout performed, anesthesia consent given and patient being monitored    Timeout Time: 09:22          Block Type:   Block Type:  Saphenous  Laterality:  Right  Monitoring:  Continuous pulse ox, oxygen, responsive to questions, frequent vital sign checks and heart rate  Injection Technique:  Single shot  Patient Position: supine  Prep: chlorhexidine    Catheter size: 25g.   Needle Localization:  Anatomical landmarks and infiltration  Medication Injected:  Mepivacaine (PF) 2 % (POLOCAINE) injection, 8 mL  ropivacaine (PF) (NAROPIN)(0.5%) 5 mg/mL injection, 12 mL  dexamethasone (DECADRON) 4 mg/mL injection, 4 mg  Med Admin Time: 10/12/2020 9:24 AM    Assessment:  Number of attempts:  1  Injection Assessment:  Incremental injection every 5 mL, no paresthesia, negative aspiration for blood and no intravascular symptoms  Patient tolerance:  Patient tolerated the procedure well with no immediate complications

## 2020-10-12 NOTE — ANESTHESIA PROCEDURE NOTES
Popliteal block with ultrasound    Start time: 10/12/2020 9:18 AM  End time: 10/12/2020 9:22 AM  Performed by: Melva Henry MD  Authorized by: Melva Henry MD       Pre-procedure:    Indications: at surgeon's request and post-op pain management    Preanesthetic Checklist: patient identified, risks and benefits discussed, site marked, timeout performed, anesthesia consent given and patient being monitored    Timeout Time: 09:18          Block Type:   Block Type:  Popliteal  Laterality:  Right  Monitoring:  Continuous pulse ox, frequent vital sign checks, heart rate, oxygen and responsive to questions  Injection Technique:  Single shot  Procedures: ultrasound guided    Prep: chlorhexidine    Location:  Lower thigh  Needle Type:  Stimuplex  Needle Gauge:  20 G  Needle Localization:  Ultrasound guidance  Medication Injected:  Ropivacaine (PF) (NAROPIN)(0.5%) 5 mg/mL injection, 18 mL  mepivacaine (PF) 2 % (POLOCAINE) injection, 12 mL  dexamethasone (DECADRON) 4 mg/mL injection, 5 mg  Med Admin Time: 10/12/2020 9:22 AM    Assessment:  Number of attempts:  1  Injection Assessment:  Incremental injection every 5 mL, local visualized surrounding nerve on ultrasound, negative aspiration for blood, no intravascular symptoms, no paresthesia and ultrasound image on chart  Patient tolerance:  Patient tolerated the procedure well with no immediate complications

## 2020-10-12 NOTE — ANESTHESIA POSTPROCEDURE EVALUATION
Procedure(s):  [place] [facility] [place].    total IV anesthesia    Anesthesia Post Evaluation      Multimodal analgesia: multimodal analgesia used between 6 hours prior to anesthesia start to PACU discharge  Patient location during evaluation: PACU  Patient participation: complete - patient participated  Level of consciousness: awake and alert  Pain management: adequate  Airway patency: patent  Anesthetic complications: no  Cardiovascular status: acceptable  Respiratory status: acceptable  Hydration status: acceptable  Post anesthesia nausea and vomiting:  none      INITIAL Post-op Vital signs:   Vitals Value Taken Time   /76 10/12/2020 11:20 AM   Temp 36.4 °C (97.6 °F) 10/12/2020 11:01 AM   Pulse 79 10/12/2020 11:20 AM   Resp 16 10/12/2020 11:20 AM   SpO2 99 % 10/12/2020 11:20 AM
DISPLAY PLAN FREE TEXT

## 2020-10-12 NOTE — BRIEF OP NOTE
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 Procedure(s):  RIGHT [employer] PROCEDURE/spring ligament recon/     Surgeon(s):  Fabienne Jha MD    Surgical Assistant: None    Anesthesia: MAC     Estimated Blood Loss (mL): Minimal    Complications: None    Specimens: * No specimens in log *     Implants:   Implant Name Type Inv.  Item Serial No.  Lot No. LRB No. Used Action   ANCHOR SUT 2 DIA4.5MM FRANK PEEK NONABSORBABLE ULTBRAID [facility] - [id number]  ANCHOR SUT 2 DIA4.5MM [employer]ANK [employer]EEK NONABSORBABLE [facility] AND NEPH ENDOSCOPY_WD [id number] Right 1 Implanted       Drains: * No LDAs found *    Findings:     Electronically Signed by Aneudy Adams MD on 10/12/2020 at 10:59 AM

## 2020-10-12 NOTE — OP NOTES
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 Right Kidner procedure, [id number]                            2.  Right spring ligament repair, 53272    SURGEON: David Raya MD    HARDWARE:   Implant Name Type Inv. Item Serial No.  Lot No. LRB No. Used Action   ANCHOR SUT 2 DIA4.5MM [employer]ANK [employer] NONABSORBABLE ULTBRAID DBL - [id number]  ANCHOR SUT 2 DIA4.5MM [employer]ANK [employer] NONABSORBABLE ULTBRAID DBL  [employer] AND NEPHEW ENDOSCOPY_WD [id number] Right 1 Implanted     INDICATIONS: This is a 42-year-old female symptomatic right painful accessory navicular and pes planus who is failed conservative therapy. She desires surgical treatment. Risks and benefits of the procedure including but not limited to risk of anesthetic complications as well as surgical complications including damage nerves blood vessels, risk of infection, risk of incomplete pain relief, and need for additional surgery have been discussed with the patient. She understands the risks and wishes to proceed with surgery at this time. PROCEDURE IN DETAIL: A time out was done to confirm the operating procedure, surgeon, patient and site. A block was placed by the department of anesthesia. In a preop surgical timeout the right lower extremity was identified as the correct surgical site and prepped and draped in a standard sterile fashion using ChloraPrep solution. A medial approach to the posterior tibial tendon sheath was an open at that time. The underlying posterior tibial tendon was identified. The accessory navicular was then excised from the posterior tibial tendon. A suture anchor was then placed into the navicular using image guidance.   The posterior tibial tendon was then imbricated up to the native navicular using the sutures on the suture anchor. The spring ligament was repaired using Vicryl sutures. The skin was repaired using Monocryl and nylon sutures. Sterile dressings then applied followed by a well-padded posterior splint. Anesthesia was discontinued. The patient was transferred back to recovery bed. She was taken the recovery in satisfactory condition. She appeared to tolerate procedure well. There were no apparent surgical or anesthetic complications. All needle and sponge counts were correct. Postoperatively should be nonweightbearing in her splint taken aspirin 325 mg daily for DVT prophylaxis. TOURNIQUET TIME: Approx 20 minutes. SPECIMENS: none    ESTIMATED BLOOD LOSS: min mL.

## 2020-10-12 NOTE — DISCHARGE INSTRUCTIONS
INSTRUCTIONS FOLLOWING FOOT SURGERY   [phone]  ACTIVITY  Elevate foot (feet) while awake. NO ice   Use crutches as directed by your doctor at all times  No weight bearing on operative foot. Take one full strength aspirin (325 mg) per day, unless you have been told by your primary MD not to or have history of GI ulcers. Get up out of bed frequently. While in the bed, move your legs as much as possible. DIET  Clear liquids until no nausea or vomiting; then light diet for the first day. Advance to regular diet on second day, unless your doctor orders otherwise. Avoid greasy and spicy food today to reduce nausea    PAIN  Begin taking pain pills when sensation returns or at bedtime even if still numb  Take pain medications as directed by your doctor. Call your doctor if pain is NOT relieved by medication. DO NOT take aspirin or blood thinners until directed by your doctor. Take with food to reduce nausea  May cause constipation Use stool softener    Keep scheduled appointment    DRESSING CARE  Keep clean and dry until follow up appointment. Patient Education        Wearing a Fiberglass Cast: Care Instructions  Your Care Instructions     A cast protects a broken bone or other injury while it heals. Most casts are made of fiberglass. After a cast is put on, you can't remove it yourself. Your doctor or a technician will take it off. Follow-up care is a key part of your treatment and safety. Be sure to make and go to all appointments, and call your doctor if you are having problems. It's also a good idea to know your test results and keep a list of the medicines you take. How can you care for yourself at home? General care  · Follow your doctor's instructions for when you can start using the limb that has the cast.  NO weight bearing   ·   · Prop up the injured  leg on a pillow anytime you sit or lie down during the first 3 days. Try to keep it above the level of your heart. This will help reduce swelling.   · Keep the cast dry. · Be safe with medicines. Read and follow all instructions on the label. ? If the doctor gave you a prescription medicine for pain, take it as prescribed. ? If you are not taking a prescription pain medicine, ask your doctor if you can take an over-the-counter medicine. · Do exercises as instructed by your doctor or physical therapist. These exercises will help keep your muscles strong and your joints flexible while you heal.  · Wiggle your fingers or toes on the injured arm or leg often. This helps reduce swelling and stiffness. Water and your cast  ·   · Use a bag or tape a sheet of plastic to cover your cast when you take a shower or bath or when you have any other contact with water. (Don't take a bath unless you can keep the cast out of the water.) Moisture can collect under the cast and cause skin irritation and itching. It can make infection more likely if you have had surgery or have a wound under the cast.  · If you have a water-resistant cast, ask your doctor how often it can get wet and how to take care of it. Cast and skin care  · Try blowing cool air from a hair dryer or fan into the cast to help relieve itching. Never stick items under your cast to scratch the skin. · Don't use oils or lotions near your cast. If the skin gets red or irritated around the edge of the cast, you may pad the edges with a soft material or use tape to cover them. When should you call for help? Call your doctor now or seek immediate medical care if:    · You have increased or severe pain.     · You feel a warm or painful spot under the cast.     · You have problems with your cast. For example:  ? The skin under the cast burns or stings. ? The cast feels too tight or too loose. ? There is a lot of swelling near the cast. (Some swelling is normal.)  ? You have a new fever. ?  There is drainage or a bad smell coming from the cast.     · Your foot or hand is cool or pale or changes color.     · You have trouble moving your fingers or toes.     · You have symptoms of a blood clot in your arm or leg (called a deep vein thrombosis). These may include:  ? Pain in the arm, calf, back of the knee, thigh, or groin. ? Redness and swelling in the arm, leg, or groin. Watch closely for changes in your health, and be sure to contact your doctor if:    · The cast is breaking apart.     · You are not getting better as expected. Where can you learn more? Go to http://www.gray.com/  Enter O925 in the search box to learn more about \"Wearing a Fiberglass Cast: Care Instructions. \"  Current as of: March 2, 2020               Content Version: 12.6  © 6572-3010 [employer]. Care instructions adapted under license by [employer] (which disclaims liability or warranty for this information). If you have questions about a medical condition or this instruction, always ask your healthcare professional. [place] [address] any warranty or liability for your use of this information. CALL YOUR DOCTOR IF YOU HAVE  Excessive bleeding that does not stop after holding mild pressure over the area. Temperature of 101 degrees or above. Loss of sensation - cold, white or blue toes. After general anesthesia or intravenous sedation, for 24 hours or while taking prescription Narcotics:  · Limit your activities  · A responsible adult needs to be with you for the next 24 hours  · Do not drive and operate hazardous machinery  · Do not make important personal or business decisions  · Do not drink alcoholic beverages  · If you have not urinated within 8 hours after discharge, and you are experiencing discomfort from urinary retention, please go to the nearest ED. · If you have sleep apnea and have a CPAP machine, please use it for all naps and sleeping. · Please use caution when taking narcotics and any of your home medications that may cause drowsiness.   *  Please give a list of your current medications to your Primary Care Provider. *  Please update this list whenever your medications are discontinued, doses are      changed, or new medications (including over-the-counter products) are added. *  Please carry medication information at all times in case of emergency situations. These are general instructions for a healthy lifestyle:  No smoking/ No tobacco products/ Avoid exposure to second hand smoke  Surgeon General's Warning:  Quitting smoking now greatly reduces serious risk to your health. Obesity, smoking, and sedentary lifestyle greatly increases your risk for illness  A healthy diet, regular physical exercise & weight monitoring are important for maintaining a healthy lifestyle    You may be retaining fluid if you have a history of heart failure or if you experience any of the following symptoms:  Weight gain of 3 pounds or more overnight or 5 pounds in a week, increased swelling in our hands or feet or shortness of breath while lying flat in bed. Please call your doctor as soon as you notice any of these symptoms; do not wait until your next office visit. Advance Care Planning  People with COVID-19 may have no symptoms, mild symptoms, such as fever, cough, and shortness of breath or they may have more severe illness, developing severe and fatal pneumonia. As a result, Advance Care Planning with attention to naming a health care decision maker (someone you trust to make healthcare decisions for you if you could not speak for yourself) and sharing other health care preferences is important BEFORE a possible health crisis. Please contact your Primary Care Provider to discuss Advance Care Planning.      Preventing the Spread of Coronavirus Disease 2019 in Homes and Residential Communities  For the most recent information go to [employer].    Prevention steps for People with confirmed or suspected COVID-19 (including persons under investigation) who do not need to be hospitalized  and   People with confirmed COVID-19 who were hospitalized and determined to be medically stable to go home    Your healthcare provider and public health staff will evaluate whether you can be cared for at home. If it is determined that you do not need to be hospitalized and can be isolated at home, you will be monitored by staff from your local or state health department. You should follow the prevention steps below until a healthcare provider or local or state health department says you can return to your normal activities. Stay home except to get medical care  People who are mildly ill with COVID-19 are able to isolate at home during their illness. You should restrict activities outside your home, except for getting medical care. Do not go to work, school, or public areas. Avoid using public transportation, ride-sharing, or taxis. Separate yourself from other people and animals in your home  People: As much as possible, you should stay in a specific room and away from other people in your home. Also, you should use a separate bathroom, if available. Animals: You should restrict contact with pets and other animals while you are sick with COVID-19, just like you would around other people. Although there have not been reports of pets or other animals becoming sick with COVID-19, it is still recommended that people sick with COVID-19 limit contact with animals until more information is known about the virus. When possible, have another member of your household care for your animals while you are sick. If you are sick with COVID-19, avoid contact with your pet, including petting, snuggling, being kissed or licked, and sharing food. If you must care for your pet or be around animals while you are sick, wash your hands before and after you interact with pets and wear a facemask.   Call ahead before visiting your doctor  If you have a medical appointment, call the healthcare provider and tell them that you have or may have COVID-19. This will help the healthcare providers office take steps to keep other people from getting infected or exposed. Wear a facemask  You should wear a facemask when you are around other people (e.g., sharing a room or vehicle) or pets and before you enter a healthcare providers office. If you are not able to wear a facemask (for example, because it causes trouble breathing), then people who live with you should not stay in the same room with you, or they should wear a facemask if they enter your room. Cover your coughs and sneezes  Cover your mouth and nose with a tissue when you cough or sneeze. Throw used tissues in a lined trash can. Immediately wash your hands with soap and water for at least 20 seconds or, if soap and water are not available, clean your hands with an alcohol-based hand  that contains at least 60% alcohol. Clean your hands often  Wash your hands often with soap and water for at least 20 seconds, especially after blowing your nose, coughing, or sneezing; going to the bathroom; and before eating or preparing food. If soap and water are not readily available, use an alcohol-based hand  with at least 60% alcohol, covering all surfaces of your hands and rubbing them together until they feel dry. Soap and water are the best option if hands are visibly dirty. Avoid touching your eyes, nose, and mouth with unwashed hands. Avoid sharing personal household items  You should not share dishes, drinking glasses, cups, eating utensils, towels, or bedding with other people or pets in your home. After using these items, they should be washed thoroughly with soap and water.   Clean all high-touch surfaces everyday  High touch surfaces include counters, tabletops, doorknobs, bathroom fixtures, toilets, phones, keyboards, tablets, and bedside tables. Also, clean any surfaces that may have blood, stool, or body fluids on them. Use a household cleaning spray or wipe, according to the label instructions. Labels contain instructions for safe and effective use of the cleaning product including precautions you should take when applying the product, such as wearing gloves and making sure you have good ventilation during use of the product. Monitor your symptoms  Seek prompt medical attention if your illness is worsening (e.g., difficulty breathing). Before seeking care, call your healthcare provider and tell them that you have, or are being evaluated for, COVID-19. Put on a facemask before you enter the facility. These steps will help the healthcare providers office to keep other people in the office or waiting room from getting infected or exposed. Ask your healthcare provider to call the local or state health department. Persons who are placed under active monitoring or facilitated self-monitoring should follow instructions provided by their local health department or occupational health professionals, as appropriate. When working with your local health department check their available hours. If you have a medical emergency and need to call 911, notify the dispatch personnel that you have, or are being evaluated for COVID-19. If possible, put on a facemask before emergency medical services arrive. Discontinuing home isolation  Patients with confirmed COVID-19 should remain under home isolation precautions until the risk of secondary transmission to others is thought to be low. The decision to discontinue home isolation precautions should be made on a case-by-case basis, in consultation with healthcare providers and state and local health departments. Patient Education        Learning About How to Use Crutches  Your Care Instructions  Crutches can help you walk when you have an injured hip, leg, knee, ankle, or foot.  Your doctor will tell you how much weight--if any--you can put on your leg. Be sure your crutches fit you. When you stand up in your normal posture, there should be space for two or three fingers between the top of the crutch and your armpit. When you let your hands hang down, the hand  should be at your wrists. When you put your hands on the hand , your elbows should be slightly bent. To stay safe when using crutches:  · Look straight ahead, not down at your feet. · Clear away small rugs, cords, or anything else that could cause you to trip, slip, or fall. · Be very careful around pets and small children. They can get in your path when you least expect it. · Be sure the rubber tips on your crutches are clean and in good condition to help prevent slipping. · Avoid slick conditions, such as wet floors and snowy or icy driveways. In bad weather, be especially careful on curbs and steps. How to use crutches  Getting ready to walk   1. Bend your elbows slightly. Press the padded top parts of the crutches against your sides, under your armpits. 2. If you have been told not to put any weight on your injured leg, keep that leg bent and off the ground. How to walk with crutches when you can put weight on the injured leg   Crutches allow you to take all the weight off of one leg. They can also be used as an added support if you have some injury or condition of both legs. Here's how to walk with crutches when you're able to put weight on your weak or injured leg. Be sure your crutches fit you. · When you stand up in your normal posture, there should be space for two or three fingers between the top of the crutch and your underarm. · When you let your hands hang down, the hand  should be at your wrists. · When you put your hands on the hand , your elbows should be slightly bent. 1. Put both crutches about 12 inches in front of you. 2. Put your weight on the handgrips, not on the pads under your arms.   3. Move your weak or injured leg forward so it's almost even with the crutches. 4. Bring your good leg up, so it's even with your weak or injured leg. 5. Move your crutches about 12 inches in front of you, and start the next step. The crutches and your feet should form a triangle. Hold the crutches close enough to your body so you can push straight down on them, but leave room between the crutches for your body to pass through. Don't lean forward to reach farther. Constant pressure against your underarms can cause numbness. When you're confident using the crutches, you can move the crutches and your injured leg at the same time. Then push straight down on the crutches as you step past the crutches with your strong leg, as you would in normal walking. Take small steps. Use ramps and elevators when you can. Sitting down   1. To sit, back up to the chair. Use one hand to hold both crutches by the handgrips, beside your injured leg. With the other hand, hold onto the seat and slowly lower yourself onto the chair. 2. Lay the crutches on the ground near your chair. If you prop them up, they may fall over. Getting up from a chair   1. To get up from a chair,  the crutches and put them in one hand beside your injured leg. 2. Put your weight on the handgrips of the crutches and on your strong leg to stand up. How to go up and down stairs using crutches   Here's how to go up and down stairs using crutches. Try this first with another person nearby to steady you if needed. 1. Stand near the edge of the stairs. 2. Go up or down the stairs. · If you are going up, step up with your stronger leg. Then bring the crutches and your weak or injured leg to the upper step. · If you are going down, put your crutches and your weak or injured leg on the lower step. Then bring your stronger leg down to the lower step. Remember \"up with the good, and down with the bad\" to help you lead with the correct leg. Crutches:  How to go up and down stairs with handrails   If the stairs have a good sturdy handrail, you can hold it with one hand and your crutches together in the other hand. Here's how. Try this first with another person nearby to steady you if needed. If the stairs have a handrail but you don't think it's sturdy enough, use the crutches normally, holding one in each hand. 1. Stand near the edge of the stairs. 2. Put both crutches under the arm opposite the handrail. 3. Use the hand opposite the handrail to hold both crutches by the handgrips. 4. Hold onto the handrail as you go up or down. 5. Go up or down the stairs. · If you are going up, step up with your stronger leg. Then bring the crutches and your weak or injured leg to the upper step. · If you are going down, put your crutches and your weak or injured leg on the lower step. Then bring your stronger leg down to the lower step. Remember \"up with the good, down with the bad\" to help you lead with the correct leg. Follow-up care is a key part of your treatment and safety. Be sure to make and go to all appointments, and call your doctor if you are having problems. It's also a good idea to know your test results and keep a list of the medicines you take. Where can you learn more? Go to http://www.gray.com/  Enter G273 in the search box to learn more about \"Learning About How to Use Crutches. \"  Current as of: March 2, 2020               Content Version: 12.6  © 1761-9560 [employer]. Care instructions adapted under license by [employer] (which disclaims liability or warranty for this information). If you have questions about a medical condition or this instruction, always ask your healthcare professional. [address] any warranty or liability for your use of this information.

## 2020-10-13 NOTE — ADDENDUM NOTE
Addendum  created 10/13/20 1142 by Maycol Arreola CRNA    Flowsheet accepted, Intraprocedure Flowsheets edited

## 2020-10-13 NOTE — ADDENDUM NOTE
Addendum  created 10/13/20 1143 by Ino Bryant CRNA    Flowsheet accepted, Intraprocedure Flowsheets edited

## 2021-04-12 ENCOUNTER — ANESTHESIA EVENT (OUTPATIENT)
Dept: SURGERY | Age: 26
End: 2021-04-12
Payer: OTHER MISCELLANEOUS

## 2021-04-13 ENCOUNTER — ANESTHESIA (OUTPATIENT)
Dept: SURGERY | Age: 26
End: 2021-04-13
Payer: OTHER MISCELLANEOUS

## 2021-04-13 ENCOUNTER — HOSPITAL ENCOUNTER (OUTPATIENT)
Age: 26
Setting detail: OUTPATIENT SURGERY
Discharge: HOME | End: 2021-04-13
Attending: ORTHOPAEDIC SURGERY | Admitting: ORTHOPAEDIC SURGERY
Payer: OTHER MISCELLANEOUS

## 2021-04-13 ENCOUNTER — APPOINTMENT (OUTPATIENT)
Dept: GENERAL RADIOLOGY | Age: 26
End: 2021-04-13
Attending: ORTHOPAEDIC SURGERY
Payer: OTHER MISCELLANEOUS

## 2021-04-13 VITALS
SYSTOLIC BLOOD PRESSURE: 121 MMHG | HEART RATE: 78 BPM | WEIGHT: 150 LBS | TEMPERATURE: 98.8 F | DIASTOLIC BLOOD PRESSURE: 76 MMHG | OXYGEN SATURATION: 99 % | RESPIRATION RATE: 15 BRPM | BODY MASS INDEX: 27.44 KG/M2

## 2021-04-13 DIAGNOSIS — M76.821 POSTERIOR TIBIAL TENDINITIS OF RIGHT LOWER EXTREMITY: Primary | ICD-10-CM

## 2021-04-13 LAB — HCG UR QL: NEGATIVE

## 2021-04-13 PROCEDURE — 2709999900 HC NON-CHARGEABLE SUPPLY: Performed by: ORTHOPAEDIC SURGERY

## 2021-04-13 PROCEDURE — 76210000016 HC OR PH I REC 1 TO 1.5 HR: Performed by: ORTHOPAEDIC SURGERY

## 2021-04-13 PROCEDURE — 76010010054 HC POST OP PAIN BLOCK: Performed by: ORTHOPAEDIC SURGERY

## 2021-04-13 PROCEDURE — 77030010509 HC AIRWY LMA MSK TELE -A: Performed by: STUDENT IN AN ORGANIZED HEALTH CARE EDUCATION/TRAINING PROGRAM

## 2021-04-13 PROCEDURE — 77030025281 HC SPLNT ORTHGLS 1 BSNM -B: Performed by: ORTHOPAEDIC SURGERY

## 2021-04-13 PROCEDURE — 77030020797 HC BIT DRL DISP SN -C: Performed by: ORTHOPAEDIC SURGERY

## 2021-04-13 PROCEDURE — 74011000250 HC RX REV CODE- 250: Performed by: NURSE ANESTHETIST, CERTIFIED REGISTERED

## 2021-04-13 PROCEDURE — C1713 ANCHOR/SCREW BN/BN,TIS/BN: HCPCS | Performed by: ORTHOPAEDIC SURGERY

## 2021-04-13 PROCEDURE — 77030040922 HC BLNKT HYPOTHRM STRY -A: Performed by: STUDENT IN AN ORGANIZED HEALTH CARE EDUCATION/TRAINING PROGRAM

## 2021-04-13 PROCEDURE — 76210000020 HC REC RM PH II FIRST 0.5 HR: Performed by: ORTHOPAEDIC SURGERY

## 2021-04-13 PROCEDURE — 77030002916 HC SUT ETHLN J&J -A: Performed by: ORTHOPAEDIC SURGERY

## 2021-04-13 PROCEDURE — 74011250636 HC RX REV CODE- 250/636: Performed by: STUDENT IN AN ORGANIZED HEALTH CARE EDUCATION/TRAINING PROGRAM

## 2021-04-13 PROCEDURE — 77030002933 HC SUT MCRYL J&J -A: Performed by: ORTHOPAEDIC SURGERY

## 2021-04-13 PROCEDURE — 77030013789 HC KT REP BIO TEND ARTH -C: Performed by: ORTHOPAEDIC SURGERY

## 2021-04-13 PROCEDURE — 76060000033 HC ANESTHESIA 1 TO 1.5 HR: Performed by: ORTHOPAEDIC SURGERY

## 2021-04-13 PROCEDURE — 77030002982 HC SUT POLYSRB J&J -A: Performed by: ORTHOPAEDIC SURGERY

## 2021-04-13 PROCEDURE — 74011250636 HC RX REV CODE- 250/636: Performed by: NURSE ANESTHETIST, CERTIFIED REGISTERED

## 2021-04-13 PROCEDURE — 76010000161 HC OR TIME 1 TO 1.5 HR INTENSV-TIER 1: Performed by: ORTHOPAEDIC SURGERY

## 2021-04-13 PROCEDURE — 74011250637 HC RX REV CODE- 250/637: Performed by: STUDENT IN AN ORGANIZED HEALTH CARE EDUCATION/TRAINING PROGRAM

## 2021-04-13 PROCEDURE — 76010010054 HC POST OP PAIN BLOCK

## 2021-04-13 PROCEDURE — 81025 URINE PREGNANCY TEST: CPT

## 2021-04-13 PROCEDURE — 74011250636 HC RX REV CODE- 250/636: Performed by: ORTHOPAEDIC SURGERY

## 2021-04-13 PROCEDURE — 77030000032 HC CUF TRNQT ZIMM -B: Performed by: ORTHOPAEDIC SURGERY

## 2021-04-13 PROCEDURE — 77030006788 HC BLD SAW OSC STRY -B: Performed by: ORTHOPAEDIC SURGERY

## 2021-04-13 PROCEDURE — C1769 GUIDE WIRE: HCPCS | Performed by: ORTHOPAEDIC SURGERY

## 2021-04-13 PROCEDURE — 76942 ECHO GUIDE FOR BIOPSY: CPT | Performed by: ORTHOPAEDIC SURGERY

## 2021-04-13 DEVICE — HEALICOIL REGENESORB 4.75 MM                                    SUTURE ANCHOR WITH ONE ULTRATAPE                                    SUTURE COBRAID BLUE AND ONE                                    ULTRABRAID SUTURE NO.2
Type: IMPLANTABLE DEVICE | Site: FOOT | Status: FUNCTIONAL
Brand: HEALICOIL REGENESORB

## 2021-04-13 DEVICE — SCREW INTFR L15MM DIA4.75MM BIOCOMPOSITE BIO-TENODESIS: Type: IMPLANTABLE DEVICE | Site: FOOT | Status: FUNCTIONAL

## 2021-04-13 DEVICE — SCREW BONE L50MM DIA6.7MM THRD L18MM FT ANK TI ST SELF DRL: Type: IMPLANTABLE DEVICE | Site: FOOT | Status: FUNCTIONAL

## 2021-04-13 RX ORDER — SODIUM CHLORIDE, SODIUM LACTATE, POTASSIUM CHLORIDE, CALCIUM CHLORIDE 600; 310; 30; 20 MG/100ML; MG/100ML; MG/100ML; MG/100ML
100 INJECTION, SOLUTION INTRAVENOUS CONTINUOUS
Status: DISCONTINUED | OUTPATIENT
Start: 2021-04-13 | End: 2021-04-13 | Stop reason: HOSPADM

## 2021-04-13 RX ORDER — DEXAMETHASONE SODIUM PHOSPHATE 4 MG/ML
INJECTION, SOLUTION INTRA-ARTICULAR; INTRALESIONAL; INTRAMUSCULAR; INTRAVENOUS; SOFT TISSUE AS NEEDED
Status: DISCONTINUED | OUTPATIENT
Start: 2021-04-13 | End: 2021-04-13 | Stop reason: HOSPADM

## 2021-04-13 RX ORDER — OXYCODONE HYDROCHLORIDE 5 MG/1
5 TABLET ORAL
Qty: 30 TAB | Refills: 0 | Status: SHIPPED | OUTPATIENT
Start: 2021-04-13 | End: 2021-04-16

## 2021-04-13 RX ORDER — FLUMAZENIL 0.1 MG/ML
0.2 INJECTION INTRAVENOUS
Status: DISCONTINUED | OUTPATIENT
Start: 2021-04-13 | End: 2021-04-13 | Stop reason: HOSPADM

## 2021-04-13 RX ORDER — SODIUM CHLORIDE 0.9 % (FLUSH) 0.9 %
5-40 SYRINGE (ML) INJECTION EVERY 8 HOURS
Status: DISCONTINUED | OUTPATIENT
Start: 2021-04-13 | End: 2021-04-13 | Stop reason: HOSPADM

## 2021-04-13 RX ORDER — OXYCODONE HYDROCHLORIDE 5 MG/1
5 TABLET ORAL
Status: COMPLETED | OUTPATIENT
Start: 2021-04-13 | End: 2021-04-13

## 2021-04-13 RX ORDER — FENTANYL CITRATE 50 UG/ML
INJECTION, SOLUTION INTRAMUSCULAR; INTRAVENOUS AS NEEDED
Status: DISCONTINUED | OUTPATIENT
Start: 2021-04-13 | End: 2021-04-13 | Stop reason: HOSPADM

## 2021-04-13 RX ORDER — LIDOCAINE HYDROCHLORIDE 10 MG/ML
0.1 INJECTION INFILTRATION; PERINEURAL AS NEEDED
Status: DISCONTINUED | OUTPATIENT
Start: 2021-04-13 | End: 2021-04-13 | Stop reason: HOSPADM

## 2021-04-13 RX ORDER — PROPOFOL 10 MG/ML
INJECTION, EMULSION INTRAVENOUS AS NEEDED
Status: DISCONTINUED | OUTPATIENT
Start: 2021-04-13 | End: 2021-04-13 | Stop reason: HOSPADM

## 2021-04-13 RX ORDER — SODIUM CHLORIDE 0.9 % (FLUSH) 0.9 %
5-40 SYRINGE (ML) INJECTION AS NEEDED
Status: DISCONTINUED | OUTPATIENT
Start: 2021-04-13 | End: 2021-04-13 | Stop reason: HOSPADM

## 2021-04-13 RX ORDER — DEXAMETHASONE SODIUM PHOSPHATE 4 MG/ML
INJECTION, SOLUTION INTRA-ARTICULAR; INTRALESIONAL; INTRAMUSCULAR; INTRAVENOUS; SOFT TISSUE
Status: DISCONTINUED | OUTPATIENT
Start: 2021-04-13 | End: 2021-04-13 | Stop reason: HOSPADM

## 2021-04-13 RX ORDER — ROPIVACAINE HYDROCHLORIDE 5 MG/ML
INJECTION, SOLUTION EPIDURAL; INFILTRATION; PERINEURAL
Status: DISCONTINUED | OUTPATIENT
Start: 2021-04-13 | End: 2021-04-13 | Stop reason: HOSPADM

## 2021-04-13 RX ORDER — MIDAZOLAM HYDROCHLORIDE 1 MG/ML
2 INJECTION, SOLUTION INTRAMUSCULAR; INTRAVENOUS ONCE
Status: COMPLETED | OUTPATIENT
Start: 2021-04-13 | End: 2021-04-13

## 2021-04-13 RX ORDER — CEFAZOLIN SODIUM/WATER 2 G/20 ML
2 SYRINGE (ML) INTRAVENOUS ONCE
Status: COMPLETED | OUTPATIENT
Start: 2021-04-13 | End: 2021-04-13

## 2021-04-13 RX ORDER — ONDANSETRON 2 MG/ML
INJECTION INTRAMUSCULAR; INTRAVENOUS AS NEEDED
Status: DISCONTINUED | OUTPATIENT
Start: 2021-04-13 | End: 2021-04-13 | Stop reason: HOSPADM

## 2021-04-13 RX ORDER — HYDROMORPHONE HYDROCHLORIDE 1 MG/ML
0.5 INJECTION, SOLUTION INTRAMUSCULAR; INTRAVENOUS; SUBCUTANEOUS
Status: COMPLETED | OUTPATIENT
Start: 2021-04-13 | End: 2021-04-13

## 2021-04-13 RX ORDER — KETOROLAC TROMETHAMINE 30 MG/ML
30 INJECTION, SOLUTION INTRAMUSCULAR; INTRAVENOUS ONCE
Status: COMPLETED | OUTPATIENT
Start: 2021-04-13 | End: 2021-04-13

## 2021-04-13 RX ORDER — NALOXONE HYDROCHLORIDE 0.4 MG/ML
0.1 INJECTION, SOLUTION INTRAMUSCULAR; INTRAVENOUS; SUBCUTANEOUS
Status: DISCONTINUED | OUTPATIENT
Start: 2021-04-13 | End: 2021-04-13 | Stop reason: HOSPADM

## 2021-04-13 RX ORDER — FENTANYL CITRATE 50 UG/ML
100 INJECTION, SOLUTION INTRAMUSCULAR; INTRAVENOUS ONCE
Status: COMPLETED | OUTPATIENT
Start: 2021-04-13 | End: 2021-04-13

## 2021-04-13 RX ORDER — NALOXONE HYDROCHLORIDE 0.4 MG/ML
0.1 INJECTION, SOLUTION INTRAMUSCULAR; INTRAVENOUS; SUBCUTANEOUS AS NEEDED
Status: DISCONTINUED | OUTPATIENT
Start: 2021-04-13 | End: 2021-04-13 | Stop reason: HOSPADM

## 2021-04-13 RX ORDER — LIDOCAINE HYDROCHLORIDE 20 MG/ML
INJECTION, SOLUTION EPIDURAL; INFILTRATION; INTRACAUDAL; PERINEURAL AS NEEDED
Status: DISCONTINUED | OUTPATIENT
Start: 2021-04-13 | End: 2021-04-13 | Stop reason: HOSPADM

## 2021-04-13 RX ORDER — DIPHENHYDRAMINE HYDROCHLORIDE 50 MG/ML
12.5 INJECTION, SOLUTION INTRAMUSCULAR; INTRAVENOUS
Status: DISCONTINUED | OUTPATIENT
Start: 2021-04-13 | End: 2021-04-13 | Stop reason: HOSPADM

## 2021-04-13 RX ADMIN — PROMETHAZINE HYDROCHLORIDE 6.25 MG: 25 INJECTION INTRAMUSCULAR; INTRAVENOUS at 09:47

## 2021-04-13 RX ADMIN — KETOROLAC TROMETHAMINE 30 MG: 30 INJECTION, SOLUTION INTRAMUSCULAR at 09:32

## 2021-04-13 RX ADMIN — SODIUM CHLORIDE, SODIUM LACTATE, POTASSIUM CHLORIDE, AND CALCIUM CHLORIDE 100 ML/HR: 600; 310; 30; 20 INJECTION, SOLUTION INTRAVENOUS at 06:18

## 2021-04-13 RX ADMIN — HYDROMORPHONE HYDROCHLORIDE 0.5 MG: 1 INJECTION, SOLUTION INTRAMUSCULAR; INTRAVENOUS; SUBCUTANEOUS at 09:53

## 2021-04-13 RX ADMIN — HYDROMORPHONE HYDROCHLORIDE 0.5 MG: 1 INJECTION, SOLUTION INTRAMUSCULAR; INTRAVENOUS; SUBCUTANEOUS at 09:29

## 2021-04-13 RX ADMIN — MEPIVACAINE HYDROCHLORIDE 10 ML: 15 INJECTION, SOLUTION EPIDURAL; INFILTRATION at 07:44

## 2021-04-13 RX ADMIN — DEXAMETHASONE SODIUM PHOSPHATE 4 MG: 4 INJECTION, SOLUTION INTRAMUSCULAR; INTRAVENOUS at 08:36

## 2021-04-13 RX ADMIN — HYDROMORPHONE HYDROCHLORIDE 0.5 MG: 1 INJECTION, SOLUTION INTRAMUSCULAR; INTRAVENOUS; SUBCUTANEOUS at 09:39

## 2021-04-13 RX ADMIN — FENTANYL CITRATE 25 MCG: 50 INJECTION INTRAMUSCULAR; INTRAVENOUS at 08:33

## 2021-04-13 RX ADMIN — SODIUM CHLORIDE, SODIUM LACTATE, POTASSIUM CHLORIDE, AND CALCIUM CHLORIDE: 600; 310; 30; 20 INJECTION, SOLUTION INTRAVENOUS at 07:59

## 2021-04-13 RX ADMIN — FENTANYL CITRATE 100 MCG: 50 INJECTION, SOLUTION INTRAMUSCULAR; INTRAVENOUS at 07:38

## 2021-04-13 RX ADMIN — CEFAZOLIN 2 G: 1 INJECTION, POWDER, FOR SOLUTION INTRAVENOUS at 08:04

## 2021-04-13 RX ADMIN — ONDANSETRON 4 MG: 2 INJECTION INTRAMUSCULAR; INTRAVENOUS at 08:36

## 2021-04-13 RX ADMIN — DEXAMETHASONE SODIUM PHOSPHATE 4 MG: 4 INJECTION, SOLUTION INTRAMUSCULAR; INTRAVENOUS at 07:44

## 2021-04-13 RX ADMIN — MIDAZOLAM 2 MG: 1 INJECTION INTRAMUSCULAR; INTRAVENOUS at 07:38

## 2021-04-13 RX ADMIN — ROPIVACAINE HYDROCHLORIDE 10 ML: 150 INJECTION, SOLUTION EPIDURAL; INFILTRATION; PERINEURAL at 07:44

## 2021-04-13 RX ADMIN — FENTANYL CITRATE 50 MCG: 50 INJECTION INTRAMUSCULAR; INTRAVENOUS at 08:04

## 2021-04-13 RX ADMIN — OXYCODONE 5 MG: 5 TABLET ORAL at 09:38

## 2021-04-13 RX ADMIN — DEXAMETHASONE SODIUM PHOSPHATE 4 MG: 4 INJECTION, SOLUTION INTRAMUSCULAR; INTRAVENOUS at 07:41

## 2021-04-13 RX ADMIN — PROPOFOL 200 MG: 10 INJECTION, EMULSION INTRAVENOUS at 08:04

## 2021-04-13 RX ADMIN — FENTANYL CITRATE 25 MCG: 50 INJECTION INTRAMUSCULAR; INTRAVENOUS at 08:37

## 2021-04-13 RX ADMIN — ROPIVACAINE HYDROCHLORIDE 20 ML: 150 INJECTION, SOLUTION EPIDURAL; INFILTRATION; PERINEURAL at 07:41

## 2021-04-13 RX ADMIN — LIDOCAINE HYDROCHLORIDE 100 MG: 20 INJECTION, SOLUTION EPIDURAL; INFILTRATION; INTRACAUDAL; PERINEURAL at 08:04

## 2021-04-13 RX ADMIN — HYDROMORPHONE HYDROCHLORIDE 0.5 MG: 1 INJECTION, SOLUTION INTRAMUSCULAR; INTRAVENOUS; SUBCUTANEOUS at 09:19

## 2021-04-13 NOTE — ANESTHESIA PREPROCEDURE EVALUATION
Relevant Problems   No relevant active problems       Anesthetic History   No history of anesthetic complications            Review of Systems / Medical History  Patient summary reviewed, nursing notes reviewed and pertinent labs reviewed    Pulmonary  Within defined limits                 Neuro/Psych   Within defined limits           Cardiovascular            Dysrhythmias : PVC      Exercise tolerance: >4 METS  Comments: Stress Echo 2018- EF 55%, no ST changes no RWMA, normal stress scho   GI/Hepatic/Renal     GERD: well controlled           Endo/Other  Within defined limits           Other Findings              Physical Exam    Airway  Mallampati: II  TM Distance: 4 - 6 cm  Neck ROM: normal range of motion   Mouth opening: Normal     Cardiovascular  Regular rate and rhythm,  S1 and S2 normal,  no murmur, click, rub, or gallop             Dental  No notable dental hx       Pulmonary  Breath sounds clear to auscultation               Abdominal         Other Findings            Anesthetic Plan    ASA: 2  Anesthesia type: general      Post-op pain plan if not by surgeon: peripheral nerve block single    Induction: Intravenous  Anesthetic plan and risks discussed with: Patient and Family

## 2021-04-13 NOTE — PERIOP NOTES
Dr Deric Omalley notified of patients 10/10 pain. At bedside to assess. New orders received.  See MAr

## 2021-04-13 NOTE — DISCHARGE INSTRUCTIONS
INSTRUCTIONS FOLLOWING FOOT SURGERY    ACTIVITY  Elevate foot. No Ice  No weight bearing. Use crutches or knee walker until seen in follow up appointment  Don't put anything into the splint to relieve itching etc. Take one 325mg aspirin daily if okay with your medical doctor and you have no GI ulcer. Get up and out of bed frequently. While in bed move the legs as much as possible)    DRESSING CARE Keep dry and in place until follow up appointment    CALL YOUR DOCTOR IF YOU HAVE  Excessive bleeding that does not stop after holding mild pressure over the area. Temperature of 101 degrees or above. Redness, excessive swelling or bruising, and/or green or yellow, smelly discharge from incision. Loss of sensation - cold, white or blue toes. DIET  Day of Surgery: Clear liquids until no nausea or vomiting; then light, bland diet (Baked chicken, plain rice, grits, scrambled egg, toast). Nothing Greasy, fried or spicy today  Advance to regular diet on second day, unless your doctor orders otherwise. PAIN  Take pain medications as directed by your doctor. Call your doctor if pain is NOT relieved by medication. PAIN MED SIDE EFFECTS  Constipation: Lots of fluids, try prune juice, then OTC stool softeners if necessary  Nausea: Take medication with food. AFTER ANESTHESIA  For the first 24 hours and while taking narcotics for pain: DO NOT Drive, Drink Alcoholic beverages, or make important Decisions. Be aware of dizziness following anesthesia and while taking pain medication. Preventing Infection at Home  We care about preventing infection and avoiding the spread of germs - not only when you are in the hospital but also when you return home. When you return home from the hospital, its important to take the following steps to help prevent infection and avoid spreading germs that could infect you and others. Ask everyone in your home to follow these guidelines, too.     Clean Your Hands  · Clean your hands whenever your hands are visibly dirty, before you eat, before or after touching your mouth, nose or eyes, and before preparing food. Clean them after contact with body fluids, using the restroom, touching animals or changing diapers. · When washing hands, wet them with warm water and work up a lather. Rub hands for at least 15 seconds, then rinse them and pat them dry with a clean towel or paper towel. · When using hand sanitizers, it should take about 15 seconds to rub your hands dry. If not, you probably didnt apply enough . Cover Your Sneeze or Cough  Germs are released into the air whenever you sneeze or cough. To prevent the spread of infection:  · Turn away from other people before coughing or sneezing. · Cover your mouth or nose with a tissue when you cough or sneeze. Put the tissue in the trash. · If you dont have a tissue, cough or sneeze into your upper sleeve, not your hands. · Always clean your hands after coughing or sneezing. Care for Wounds  Your skin is your bodys first line of defense against germs, but an open wound leaves an easy way for germs to enter your body. To prevent infection:  · Clean your hands before and after changing wound dressings, and wear gloves to change dressings if recommended by your doctor. · Take special care with IV lines or other devices inserted into the body. If you must touch them, clean your hands first.  · Follow any specific instructions from your doctor to care for your wounds. Contact your doctor if you experience any signs of infection, such as fever or increased redness at the surgical or wound site. Keep a Clean Home  · Clean or wipe commonly touched hard surfaces like door handles, sinks, tabletops, phones and TV remotes. · Use products labeled disinfectant to kill harmful bacteria and viruses. · Use a clean cloth or paper towel to clean and dry surfaces.  Wiping surfaces with a dirty dishcloth, sponge or towel will only spread germs.  · Never share toothbrushes, win, drinking glasses, utensils, razor blades, face cloths or bath towels to avoid spreading germs. · Be sure that the linens that you sleep on are clean. · Keep pets away from wounds and wash your hands after touching pets, their toys or bedding. We care about you and your health. Remember, preventing infections is a team effort between you, your family, friends and health care providers. DISCHARGE SUMMARY from Nurse    PATIENT INSTRUCTIONS:    After general anesthesia or intravenous sedation, for 24 hours or while taking prescription Narcotics:  · Limit your activities  · Do not drive and operate hazardous machinery  · Do not make important personal or business decisions  · Do  not drink alcoholic beverages  · If you have not urinated within 8 hours after discharge, please contact your surgeon on call. *  Please give a list of your current medications to your Primary Care Provider. *  Please update this list whenever your medications are discontinued, doses are      changed, or new medications (including over-the-counter products) are added. *  Please carry medication information at all times in case of emergency situations. These are general instructions for a healthy lifestyle:    No smoking/ No tobacco products/ Avoid exposure to second hand smoke    Surgeon General's Warning:  Quitting smoking now greatly reduces serious risk to your health. Obesity, smoking, and sedentary lifestyle greatly increases your risk for illness    A healthy diet, regular physical exercise & weight monitoring are important for maintaining a healthy lifestyle    You may be retaining fluid if you have a history of heart failure or if you experience any of the following symptoms:  Weight gain of 3 pounds or more overnight or 5 pounds in a week, increased swelling in our hands or feet or shortness of breath while lying flat in bed.   Please call your doctor as soon as you notice any of these symptoms; do not wait until your next office visit. Recognize signs and symptoms of STROKE:    F-face looks uneven    A-arms unable to move or move unevenly    S-speech slurred or non-existent    T-time-call 911 as soon as signs and symptoms begin-DO NOT go       Back to bed or wait to see if you get better-TIME IS BRAIN. Learning About How to Use Crutches  Your Care Instructions  Crutches can help you walk when you have an injured hip, leg, knee, ankle, or foot. Your doctor will tell you how much weight--if any--you can put on your leg. Be sure your crutches fit you. When you stand up in your normal posture, there should be space for two or three fingers between the top of the crutch and your armpit. When you let your hands hang down, the hand  should be at your wrists. When you put your hands on the hand , your elbows should be slightly bent. To stay safe when using crutches:  · Look straight ahead, not down at your feet. · Clear away small rugs, cords, or anything else that could cause you to trip, slip, or fall. · Be very careful around pets and small children. They can get in your path when you least expect it. · Be sure the rubber tips on your crutches are clean and in good condition to help prevent slipping. · Avoid slick conditions, such as wet floors and snowy or icy driveways. In bad weather, be especially careful on curbs and steps. How to use crutches  Getting ready to walk    1. Bend your elbows slightly. Press the padded top parts of the crutches against your sides, under your armpits. 2. If you have been told not to put any weight on your injured leg, keep that leg bent and off the ground. Walking with crutches    1. Put both crutches about 12 inches in front of you. 2. Put your weight on the handgrips, not on the pads under your arms. (Constant pressure against your underarms can cause numbness.) Swing your body forward.  (If you have been told not to put any weight on your injured leg, keep that leg bent and off the ground.)  3. To complete the step, put your weight on the strong leg. 4. Move your crutches about 12 inches in front of you, and start the next step. 5. When you're confident using the crutches, you can move the crutches and your injured leg at the same time. Then push straight down on the crutches as you step past the crutches with your strong leg, as you would in normal walking. 6. Take small steps. 7. Use ramps and elevators when you can. Sitting down    1. To sit, back up to the chair. Use one hand to hold both crutches by the handgrips, beside your injured leg. With the other hand, hold onto the seat and slowly lower yourself onto the chair. 2. Lay the crutches on the ground near your chair. If you prop them up, they may fall over. Getting up from a chair    1. To get up from a chair,  the crutches and put them in one hand beside your injured leg. 2. Put your weight on the handgrips of the crutches and on your strong leg to stand up. Walking up stairs    1. To go up stairs, step up with your strong leg and then bring the crutches and your injured leg to the upper step. 2. For stairs that have handrails: Put both crutches under the arm opposite the handrail. Use the hand opposite the handrail to hold both crutches by the handgrips. 3. Hold onto the handrail as you go up. Put your strong leg on the step first when you go up. Walking down stairs    1. To go down stairs, put your crutches and injured leg on the lower step. 2. Bring your strong leg to the lower step. This saying may help you remember: \"Up with the good, down with the bad. \"  3. For stairs that have handrails: Put both crutches under the arm opposite the handrail. Use the hand opposite the handrail to hold both crutches by the handgrips. Hold onto the handrail as you go down.  Follow the same process you use for stairs: Lead with your crutches and injured leg on the way down.  Follow-up care is a key part of your treatment and safety. Be sure to make and go to all appointments, and call your doctor if you are having problems. It's also a good idea to know your test results and keep a list of the medicines you take. Where can you learn more? Go to http://www.gray.com/. Enter K212 in the search box to learn more about \"Learning About How to Use Crutches. \"  Current as of: August 4, 2016  Content Version: 11.2  © 1010-3315 [employer]. Care instructions adapted under license by [employer] (which disclaims liability or warranty for this information). If you have questions about a medical condition or this instruction, always ask your healthcare professional. [address] any warranty or liability for your use of this information. Learning About Coronavirus [phone])  Coronavirus [phone]): Overview  What is coronavirus (COVID-19)? The coronavirus disease (COVID-19) is caused by a virus. It is an illness that was first found in [place], [place], in December 2019. It has since spread worldwide. The virus can cause fever, cough, and trouble breathing. In severe cases, it can cause pneumonia and make it hard to breathe without help. It can cause death. Coronaviruses are a large group of viruses. They cause the common cold. They also cause more serious illnesses like [place] East respiratory syndrome (MERS) and severe acute respiratory syndrome (SARS). COVID-19 is caused by a novel coronavirus. That means it's a new type that has not been seen in people before. This virus spreads person-to-person through droplets from coughing and sneezing. It can also spread when you are close to someone who is infected. And it can spread when you touch something that has the virus on it, such as a doorknob or a tabletop. What can you do to protect yourself from coronavirus (COVID-19)?   The best way to protect yourself from getting sick is to:  · Avoid areas where there is an outbreak. · Avoid contact with people who may be infected. · Wash your hands often with soap or alcohol-based hand sanitizers. · Avoid crowds and try to stay at least 6 feet away from other people. · Wash your hands often, especially after you cough or sneeze. Use soap and water, and scrub for at least 20 seconds. If soap and water aren't available, use an alcohol-based hand . · Avoid touching your mouth, nose, and eyes. What can you do to avoid spreading the virus to others? To help avoid spreading the virus to others:  · Cover your mouth with a tissue when you cough or sneeze. Then throw the tissue in the trash. · Use a disinfectant to clean things that you touch often. · Wear a cloth face cover if you have to go to public areas. · Stay home if you are sick or have been exposed to the virus. Don't go to school, work, or public areas. And don't use public transportation, ride-shares, or taxis unless you have no choice. · If you are sick:  ? Leave your home only if you need to get medical care. But call the doctor's office first so they know you're coming. And wear a face cover. ? Wear the face cover whenever you're around other people. It can help stop the spread of the virus when you cough or sneeze. ? Clean and disinfect your home every day. Use household  and disinfectant wipes or sprays. Take special care to clean things that you grab with your hands. These include doorknobs, remote controls, phones, and handles on your refrigerator and microwave. And don't forget countertops, tabletops, bathrooms, and computer keyboards. When to call for help  [id number] anytime you think you may need emergency care. For example, call if:  · You have severe trouble breathing. (You can't talk at all.)  · You have constant chest pain or pressure. · You are severely dizzy or lightheaded. · You are confused or can't think clearly.   · Your face and lips have a blue color. · You pass out (lose consciousness) or are very hard to wake up. Call your doctor now if you develop symptoms such as:  · Shortness of breath. · Fever. · Cough. If you need to get care, call ahead to the doctor's office for instructions before you go. Make sure you wear a face cover to prevent exposing other people to the virus. Where can you get the latest information? The following health organizations are tracking and studying this virus. Their websites contain the most up-to-date information. Kamra Polina also learn what to do if you think you may have been exposed to the virus. · U.S. Centers for Disease Control and Prevention (CDC): The CDC provides updated news about the disease and travel advice. The website also tells you how to prevent the spread of infection. www.cdc.gov  · World Health Organization [facility]): WHO offers information about the virus outbreaks. WHO also has travel advice. www.who.int  Current as of: May 8, 2020               Content Version: 12.5  © 2006-2020 [employer]. Care instructions adapted under license by [employer] (which disclaims liability or warranty for this information). If you have questions about a medical condition or this instruction, always ask your healthcare professional. [address] any warranty or liability for your use of this information.

## 2021-04-13 NOTE — ANESTHESIA PROCEDURE NOTES
Peripheral Block    Start time: 4/13/2021 7:38 AM  End time: 4/13/2021 7:41 AM  Performed by: Michael Barrow MD  Authorized by: Michael Barrow MD       Pre-procedure:    Indications: at surgeon's request and post-op pain management    Preanesthetic Checklist: patient identified, risks and benefits discussed, site marked, timeout performed, anesthesia consent given and patient being monitored    Timeout Time: 07:38          Block Type:   Block Type:  Popliteal  Laterality:  Right  Monitoring:  Standard ASA monitoring, responsive to questions, oxygen, continuous pulse ox, frequent vital sign checks and heart rate  Injection Technique:  Single shot  Procedures: ultrasound guided    Patient Position: supine  Prep: chlorhexidine    Location:  Lower thigh  Needle Type:  Stimuplex  Needle Gauge:  20 G  Needle Localization:  Ultrasound guidance  Medication Injected:  Ropivacaine (PF) (NAROPIN)(0.5%) 5 mg/mL injection, 20 mL  dexamethasone (DECADRON) 4 mg/mL injection, 4 mg  Med Admin Time: 4/13/2021 7:41 AM    Assessment:  Number of attempts:  1  Injection Assessment:  Incremental injection every 5 mL, no paresthesia, local visualized surrounding nerve on ultrasound, ultrasound image on chart, negative aspiration for blood and no intravascular symptoms  Patient tolerance:  Patient tolerated the procedure well with no immediate complications

## 2021-04-13 NOTE — ANESTHESIA PROCEDURE NOTES
Peripheral Block    Start time: 4/13/2021 7:41 AM  End time: 4/13/2021 7:44 AM  Performed by: Gabriela Schafer MD  Authorized by: Gabriela Schafer MD       Pre-procedure: Indications: at surgeon's request and post-op pain management    Preanesthetic Checklist: patient identified, risks and benefits discussed, site marked, timeout performed, anesthesia consent given and patient being monitored    Timeout Time: 07:41          Block Type:   Block Type:   Adductor canal  Laterality:  Right  Monitoring:  Standard ASA monitoring, responsive to questions, oxygen, continuous pulse ox, frequent vital sign checks and heart rate  Injection Technique:  Single shot  Procedures: ultrasound guided    Patient Position: supine  Prep: chlorhexidine    Location:  Lower thigh  Needle Type:  Stimuplex  Needle Gauge:  20 G  Needle Localization:  Ultrasound guidance  Medication Injected:  Mepivacaine PF (CARBOCAINE) 1.5 % injection, 10 mL  ropivacaine (PF) (NAROPIN)(0.5%) 5 mg/mL injection, 10 mL  dexamethasone (DECADRON) 4 mg/mL injection, 4 mg  Med Admin Time: 4/13/2021 7:44 AM    Assessment:  Number of attempts:  1  Injection Assessment:  Incremental injection every 5 mL, no paresthesia, ultrasound image on chart, local visualized surrounding nerve on ultrasound, negative aspiration for blood and no intravascular symptoms  Patient tolerance:  Patient tolerated the procedure well with no immediate complications

## 2021-04-13 NOTE — H&P
Outpatient Surgery History and Physical:  Halie Dorman was seen and examined. CHIEF COMPLAINT:  Right foot    PE:     Visit Vitals  /67 (BP 1 Location: Right arm, BP Patient Position: Supine)   Pulse 82   Temp 98.2 °F (36.8 °C)   Resp 18   Wt 150 lb (68 kg)   SpO2 97%   BMI 27.44 kg/m²       Heart:   Regular rhythm      Lungs:  Are clear      Past Medical History:    Patient Active Problem List    Diagnosis    Chest pain at rest     Stress echo 5/2018 nml. Non cardiac - released by cardiology      Symptomatic PVCs     holter 10/19/17. Echo 11/17/2017 nml         Surgical History:   Past Surgical History:   Procedure Laterality Date    HX ORTHOPAEDIC Left 02/2020    LEFT FOOT KIDNER PROCEDURE     HX ORTHOPAEDIC Right 10/2020    Right foot kidner procedure and sprained ligament repair    HX WISDOM TEETH EXTRACTION      HX WISDOM TEETH EXTRACTION         Social History: Patient  reports that she has never smoked. She has never used smokeless tobacco. She reports that she does not drink alcohol or use drugs. Family History:   Family History   Problem Relation Age of Onset    No Known Problems Mother     No Known Problems Father     No Known Problems Sister     No Known Problems Brother     Hypertension Maternal Grandmother     Thyroid Disease Maternal Grandmother     Heart Surgery Maternal Grandmother     No Known Problems Maternal Grandfather     No Known Problems Paternal Grandmother     No Known Problems Paternal Grandfather     Diabetes Maternal Aunt     Diabetes Maternal Aunt        Allergies: Reviewed per EMR  No Known Allergies    Medications:    No current facility-administered medications on file prior to encounter. Current Outpatient Medications on File Prior to Encounter   Medication Sig    medroxyPROGESTERone (Depo-Provera) 150 mg/mL syrg 1 mL by IntraMUSCular route every three (3) months.  omeprazole (PRILOSEC) 20 mg capsule Take 1 Cap by mouth daily. (Patient taking differently: Take 20 mg by mouth nightly.)    amitriptyline (ELAVIL) 25 mg tablet Take 25 mg by mouth nightly. Indications: neuropathic pain       The surgery is planned for the right foot       History and physical has been reviewed. The patient has been examined. There have been no significant clinical changes since the completion of the originally dated History and Physical.  Patient identified by surgeon; surgical site was confirmed by patient and surgeon. The patient is here today for outpatient surgery. I have examined the patient, no changes are noted in the patient's medical status. Necessity for the procedure/care is still present and the history and physical above is current. See the office notes for the full long term history of the problem. Please see the recent office notes for the musculoskeletal examination.     Signed By: Nahomy Bennett MD     April 13, 2021 7:13 AM

## 2021-04-13 NOTE — BRIEF OP NOTE
Brief Postoperative Note    Patient: Saintclair Amen  YOB: 1995  MRN: [id number]    Date of Procedure: 4/13/2021     Pre-Op Diagnosis: Posterior tibial tendinitis of right leg [M76.821]    Post-Op Diagnosis: Same as preoperative diagnosis. Procedure(s):  RIGHT SPRING LIGAMENT RECONSTRUCTION  RIGHT POSTERIOR TIBIAL TENDON RECONSTRUCTION AND FDL TENDON TRANSFER  RIGHT MEDIAL SLIDE CALCANEAL OSTEOTOMY  RIGHT ACHILLES LENGTHENING OPEN    Surgeon(s):  Barak Hernandez MD    Surgical Assistant: None    Anesthesia: General     Estimated Blood Loss (mL): Minimal    Complications: None    Specimens: * No specimens in log *     Implants:   Implant Name Type Inv. Item Serial No.  Lot No. LRB No. Used Action   SCR BNE TEE 6.7X50MM 18MM THD --  - [id number]  SCR BNE TEE 6.7X50MM 18MM THD --   [employer] [id number] Right 1 Implanted   ANCHOR SUT DIA4. 75MM W/ Adina Crigler - [id number]  ANCHOR SUT DIA4. 75MM W/ COB[employer]ID [employer]ANK Armen Alexis [employer]_WD [id number] Right 1 Implanted   SCREW INTFR L15MM DIA4. 75MM BIOCOMPOSITE [employer] - [id number]  SCREW INTFR L15MM DIA4. 75MM BIOCOMPOSITE [employer]WD [id number] Right 1 Implanted       Drains: * No LDAs found *    Findings:     Electronically Signed by Ankur Sebastian MD on 4/13/2021 at 9:16 AM

## 2021-04-13 NOTE — OP NOTES
FULL OP NOTE    PATIENT NAME: nIder Cuevas  MRN: [id number]    DATE OF SURGERY: 4/13/2021    PREOPERATIVE DIAGNOSIS: Posterior tibial tendinitis of right leg [L24.163]      POSTOPERATIVE DIAGNOSIS: Posterior tibial tendinitis of right leg [M76.821]      PROCEDURE: 1. Right medial displacement calcaneal osteotomy, 05790                             2.  Right flexor digitorum longus tendon transfer, 66633                             3.  Right spring ligament reconstruction of the ankle, 52163                             4.  Right posterior tibial tendon debridement with advancement of tendon, 22744                             5.  Right gastrocnemius recession, 53262    SURGEON: Lisa King MD    HARDWARE:   Implant Name Type Inv. Item Serial No.  Lot No. LRB No. Used Action   SCR BNE TEE 6.7X50MM 18MM THD --  - [id number]  SCR BNE TEE 6.7X50MM 18MM THD --   [employer] [id number] Right 1 Implanted   ANCHOR SUT DIA4. 75MM W/ Noble Lunch - [id number]  ANCHOR SUT DIA4. 75MM W/ COBRA[employer] FRANK Davi Conifer AND [facility] ENDOSCOPY_WD [id number] Right 1 Implanted   SCREW INTFR L15MM DIA4. 75MM BIOCOMPOSITE BIO-TENODESIS - [id number]  SCREW INTFR L15MM DIA4. 75MM BIOCOMPOSITE [employer]_WD [id number] Right 1 Implanted     INDICATIONS: This patient is a 22y.o. year old female with a history of Posterior tibial tendinitis of right leg [M76.821] who has failed conservative therapy and desires surgical treatment. Risks and benefits of the procedure including, but not limited to, anesthetic complications as well as surgical complications including damage to nerves and blood vessels, risk of infection, risk of incomplete pain relief, risk of malunion, nonunion and need for additional surgery have been discussed with the patient who wishes to proceed. PROCEDURE IN DETAIL: A time out was done to confirm the operating procedure, surgeon, patient and site.   A block was placed by the department of anesthesia. During a preop surgical timeout the right lower extremity was identified as the correct surgical site and prepped and draped in a standard sterile fashion using ChloraPrep solution. A direct posterior approach to the gastrocnemius fascia was then opened. Care was taken to protect the sural nerve. A gastrocnemius recession was performed and then repaired using Monocryl and nylon sutures on the skin. A lateral approach to the heel was then opened taking care again to protect the sural nerve. A calcaneal osteotomy was performed with the tuberosity fragment shifted 1 cm medially and secured using Arthrex cannulated screw under fluoroscopic guidance. This wound was irrigated and closed using Monocryl and nylon sutures. A medial approach to the foot was then opened and carried up to the level of the medial malleolus. The underlying posterior tibial tendon was then inspected and found to be significantly scarred in with degeneration noted near the insertion of the navicular. The posterior tibial tendon was then debrided off of the navicular without difficulty. The scar area of the posterior tibial tendon was then resected. The flexor digitorum longus tendon was then identified and harvested in the midfoot near the knot of Paul. Under direct visualization a suture anchor was then placed into the sustentaculum taking care to protect the FHL as well as the neurovascular bundle. A drill hole was then placed from dorsal to plantar in the navicular and the suture tape used to repair the spring ligament plus the flexor digitorum longus tendon were advanced through these in maximal tension and secured using interference screw. Advancement of the posterior tibial tendon was performed with side to side tenodesis to the FDL tendon transfer using nonabsorbable sutures. The spring ligament was repaired using nonabsorbable sutures as well.   The wound was then irrigated and closed using Monocryl and nylon sutures. A sterile dressing was then applied followed by well-padded posterior splint. Anesthesia was discontinued. The patient was transferred back to recovery bed. She was taken to recovery in satisfactory condition. She appeared to tolerate the procedure well. There were no apparent surgical or anesthetic complications. All needle and sponge counts were correct. Postoperatively should be nonweightbearing in her splint taking aspirin 325 mg daily for DVT prophylaxis. TOURNIQUET TIME: Approx 46 minutes. SPECIMENS: none    ESTIMATED BLOOD LOSS: min   mL.

## 2021-04-13 NOTE — PERIOP NOTES
PACU DISCHARGE NOTE  Vital signs stable, pain well controlled, alert and oriented times three or at baseline, no anesthetic complications. IV removed with catheter tip intact. I have given crutches to the patient, adjusted them and provided complete instructions on safe use. Written and verbal discharge instructions given, including pain control and follow up appointment. Mother, Haroldo Rodriguez, verbalized understanding and signed discharge instructions. All questions answered prior to discharge. Dr Lennart Shone okay to discharge at this time. Pt and all belongings taken via wheelchair and safely put in vehicle.

## 2021-04-13 NOTE — ANESTHESIA POSTPROCEDURE EVALUATION
Procedure(s):  RIGHT SPRING LIGAMENT RECONSTRUCTION  RIGHT POSTERIOR TIBIAL TENDON RECONSTRUCTION AND FDL TENDON TRANSFER  RIGHT MEDIAL SLIDE CALCANEAL OSTEOTOMY  RIGHT ACHILLES LENGTHENING OPEN.     general    Anesthesia Post Evaluation      Multimodal analgesia: multimodal analgesia used between 6 hours prior to anesthesia start to PACU discharge  Patient location during evaluation: bedside  Patient participation: complete - patient participated  Level of consciousness: awake and alert  Pain management: adequate  Airway patency: patent  Anesthetic complications: no  Cardiovascular status: acceptable  Respiratory status: acceptable  Hydration status: acceptable  Post anesthesia nausea and vomiting:  controlled  Final Post Anesthesia Temperature Assessment:  Normothermia (36.0-37.5 degrees C)      INITIAL Post-op Vital signs:   Vitals Value Taken Time   /76 04/13/21 1027   Temp 37.1 °C (98.8 °F) 04/13/21 1027   Pulse 78 04/13/21 1027   Resp 15 04/13/21 1027   SpO2 99 % 04/13/21 1027

## 2021-05-27 ENCOUNTER — HOSPITAL ENCOUNTER (OUTPATIENT)
Dept: PHYSICAL THERAPY | Age: 26
Discharge: HOME | End: 2021-05-27
Attending: ORTHOPAEDIC SURGERY

## 2021-05-27 NOTE — PROGRESS NOTES
[facility]  [facility] 1995  Primary: [facility]  Secondary:  [facility]  [address]. [place] [address], [address]  [phone]   [phone]      OUTPATIENT DAILY NOTE    NAME/AGE/GENDER: Patience Sanchez is a 32 y.o. female. DATE: 2021    Ms. Venegas awaiting workers compensation approval for PT eval and treat.  contacted and information sent for urgent approval.  Will continue to follow up and will reschedule appointment ASAP when approved. Consult/screen performed only at this time.      Michele Ayala, PT

## 2021-05-28 ENCOUNTER — HOSPITAL ENCOUNTER (OUTPATIENT)
Dept: SURGERY | Age: 26
Discharge: HOME | End: 2021-05-28

## 2021-05-28 VITALS — WEIGHT: 158 LBS | HEIGHT: 62 IN | BODY MASS INDEX: 29.08 KG/M2

## 2021-05-28 NOTE — PERIOP NOTES
Patient verified name and . Order for consent not found in EHR and unable to match consent with case posting; patient verifies procedure. Type 1b surgery, phone assessment complete. Orders not received. Labs per surgeon: unknown, no orders  Labs per anesthesia protocol: none    Patient COVID test date 21; Patient did show for the appointment. The testing center is located at the [address], [place]. If appointment is needed-patient provided telephone number of [phone]. Patient answered medical/surgical history questions at their best of ability. All prior to admission medications documented in [facility]. Patient instructed to take the following medications the day of surgery according to anesthesia guidelines with a small sip of water:none. Hold all vitamins 7 days prior to surgery and NSAIDS 5 days prior to surgery. Prescription meds to hold: none    Patient instructed on the following:    > Arrive at A Entrance, time of arrival to be called the day before by 1700  > NPO after midnight including gum, mints, and ice chips  > Responsible adult must drive patient to the hospital, stay during surgery, and patient will need supervision 24 hours after anesthesia  > Use antibacterial soap in shower the night before surgery and on the morning of surgery  > All piercings must be removed prior to arrival.    > Leave all valuables (money and jewelry) at home but bring insurance card and ID on DOS.   > Do not wear make-up, nail polish, lotions, cologne, perfumes, powders, or oil on skin.

## 2021-06-02 ENCOUNTER — HOSPITAL ENCOUNTER (OUTPATIENT)
Dept: PHYSICAL THERAPY | Age: 26
Discharge: HOME | End: 2021-06-02
Payer: COMMERCIAL

## 2021-06-02 PROCEDURE — 97140 MANUAL THERAPY 1/> REGIONS: CPT

## 2021-06-02 PROCEDURE — 97110 THERAPEUTIC EXERCISES: CPT

## 2021-06-02 PROCEDURE — 97161 PT EVAL LOW COMPLEX 20 MIN: CPT

## 2021-06-02 NOTE — THERAPY EVALUATION
Janeth Oswald  : 1995      Payor: Lesa Santos / Plan: [place] [employer] / Product Type: Workers Comp /  [address] [place] [address], [address] [place] [address]  [phone]   [phone]        OUTPATIENT PHYSICAL THERAPY:Initial Assessment: 2021    ICD-10: Treatment Diagnosis:  Pain in right ankle and joints of right foot (M25.571)  Posterior tibial tendinitis, right leg (P88.069)  Encounter for other specified surgical aftercare (Z48.89)  Other abnormalities of gait and mobility (R26.89)    Precautions/Allergies:   Patient has no known allergies. Fall Risk Score: 3 (? 5 = High Risk)  MD Orders: Eval and Treat  MEDICAL/REFERRING DIAGNOSIS:  Posterior tibial tendinitis, right leg [M76.821]   DATE OF ONSET: Patient fell at work in early , current right surgery for Posterior Tibial tendonitis 2021  REFERRING PHYSICIAN: Boone Cary MD  [facility] 2021    PROCEDURE: 1. Right medial displacement calcaneal osteotomy                             2.  Right flexor digitorum longus tendon transfer                             3.  Right spring ligament reconstruction of the ankle                             4.  Right posterior tibial tendon debridement with advancement of tendon                             5.  Right gastrocnemius recession     INITIAL ASSESSMENT:   Ms. Lacey Gallardo presents to physical therapy with decreased strength, ROM, joint mobility, functional mobility, and gait. These S/S are consistent with Posterior tibial tendinitis, right leg [M76.821]. Patient will benefit from skilled physical therapy for manual therapeutic techniques (as appropriate), therapeutic exercises and activities, neuromuscular re-education, balance training and comprehensive home exercises program to address current impairments and functional limitations.     Janeth Oswald will benefit from skilled PT (medically necessary) in order to address above deficits affecting participation in basic ADLs and overall functional tolerance. PROBLEM LIST (Impacting functional limitations):   1. Decreased Strength  2. Decreased ADL/Functional Activities  3. Decreased Transfer Abilities  4. Decreased Ambulation Ability/Technique  5. Decreased Balance  6. Increased Pain  7. Decreased Activity Tolerance  8. Decreased [place] with Home Exercise Program INTERVENTIONS PLANNED:  1. Balance Exercise  2. Bed Mobility  3. Cold  4. Cryotherapy  5. Family Education  6. Gait Training  7. Heat  8. Home Exercise Program (HEP)  9. Manual Therapy  10. Neuromuscular Re-education/Strengthening  11. Range of Motion (ROM)  12. Therapeutic Activites  13. Therapeutic Exercise/Strengthening  14. Transfer Training  15. Ultrasound (US)   TREATMENT PLAN:  Effective Dates: 6/2/2021 TO 8/31/2021 (90 days). Frequency/Duration: 2 times a week for 90 Day   Short-Term Goals~4 weeks  Goal Met   1. Romana Bussing will be compliant with home exercise program within 4 weeks in order to improve active participation with management of patient's symptoms and/or functional deficits. 1.  [] Date:   2. Romana Bussing will report <=3/10 pain with walking >15 minutes in order to participate in daily exercise and daily activities 2. [] Date:   3. Romana Bussing will be able to stand >60 minutes with <2/10 pain to feet in order to participate in household duties without issues/compromise. 3.  [] Date:   4. Romana Bussing will report being able to sleep at night without waking up secondary to foot pain. 4.  [] Date:   5. 5. Romana Bussing  will improve ankle dorsi flexion active ROM from 6 to 15 for normal gait  5. [] Date:   6. Romana Bussing  will improve ankle strength to 4+ to 5/5 to improve tolerance of ADLs. 6.  [] Date:              Long Term Goals~8 weeks Goal Met   1.  Romana Bussing will be independent with home exercise program within 8 weeks in order to improve independence with management of patient's symptoms and/or functional deficits. 1.  [] Date:   2. Leida De La Vega will report no pain with walking affecting participation in activities of daily living. 2.  [] Date:   3. Leida De La Vega  will be able to stand >60 minutes without onset of foot pain. 3.  [] Date:   4. Leida De La Vega will improve PT/OT FOOT AND ANKLE ABILITY MEASURE to 50/116 to demonstrate increased tolerance to activity. 4.  [] Date:   5. Pt will report standing for > 60 minutes with minimal to no increase in pain in order to be able to stand for prolonged periods as needed to perform duties at work. 5.  [] Date:     Outcome Measure: Tool Used: PT/OT FOOT AND ANKLE ABILITY MEASURE  Score:  Initial: 5/116 Most Recent: X (Date: -- )   Interpretation of Score: For the \"Activities of Daily Living\", there are 21 questions each scored on a 5 point scale with 0 representing \"Unable to do\" and 4 representing \"No difficulty\". The lower the score, the greater the functional disability. 84/84 represents no disability. Minimal detectable change is 5.7 points. With the addition of the 8 questions in the \"Sports Subscale,\" there are 29 questions, each scored on a 5 point scale with 0 representing \"Unable to do\" and 4 representing \"No difficulty\". The lower the score, the greater the functional disability. 116/116 represents no disability. Minimal detectable change is 12.3 points. Medical Necessity:   ·    Skilled intervention continues to be required due to above deficits affecting participation in basic ADLs and overall functional tolerance. · Patient demonstrates capacity to improve ROM and strength which will increase independence, increase safety, and allow for return to previous activity level. · Patient demonstrates excellent rehab potential due to higher previous functional level.   Reason for Services/Other Comments:  · Patient requires skilled intervention due to modification of therapeutic interventions to increase complexity of exercises . Total Evaluation Duration: 20 minutes  PT Patient Time In/Time Out  Time In: 1355  Time Out: 1445      Rehabilitation Potential For Stated Goals: Excellent  Regarding [employer]'s therapy, I certify that the treatment plan above will be carried out by a therapist or under their direction. Thank you for this referral,  Mirtha Leyva, PT     Referring Physician Signature: Yue Ibarra MD _______________________________ Date _____________            HISTORY:   History of Present Injury/Illness (Reason for Referral):  Patient initially injured left posterior tibial tendon requiring surgery 2/27/2020 and she began having the same symptoms in right ankle having surgery 10/13/2020. She continued to have right posterior tibial tendon pain secondary to rupture. Revision with  flexor digitorum longus tendon transfer completed 4/13/2021.    -Present symptoms/complaints (on day of evaluation)   Pain Scale:  · Current: 6/10  · Best: 6/10  · Worst: 9/10     Aggravating factors:  Standing, Walking, bending and Lifting    Relieving factors: Rest and Ice   Irritability: Medium (Onset of pain is equal to alleviation)      Past Medical History/Comorbidities:   Ms. Marilee Garcia  has a past medical history of Abnormal Pap smear of cervix, Chest pain, COVID-19 (04/19/2021), Fracture of left foot (05/23/2019), GERD (gastroesophageal reflux disease), History of trichomonal vaginitis (09/24/2018), Posterior tibial tendonitis, Psychiatric disorder, and PVC (premature ventricular contraction). She also has no past medical history of Difficult intubation, Malignant hyperthermia due to anesthesia, Nausea & vomiting, or Pseudocholinesterase deficiency.   Ms. Marilee Garcia  has a past surgical history that includes hx wisdom teeth extraction; hx orthopaedic (Left, 02/2020); hx orthopaedic (Right, 10/2020); hx wisdom teeth extraction; and hx orthopaedic (Right, 04/2021). Active Ambulatory Problems     Diagnosis Date Noted    Symptomatic PVCs 11/06/2017    Chest pain at rest 05/04/2018     Resolved Ambulatory Problems     Diagnosis Date Noted    Chest pain 10/16/2017    Palpitations 10/17/2017     Past Medical History:   Diagnosis Date    Abnormal Pap smear of cervix     COVID-19 04/19/2021    Fracture of left foot 05/23/2019    GERD (gastroesophageal reflux disease)     History of trichomonal vaginitis 09/24/2018    Posterior tibial tendonitis     Psychiatric disorder     PVC (premature ventricular contraction)      Social History/Living Environment:        Social History     Socioeconomic History    Marital status: SINGLE     Spouse name: Not on file    Number of children: Not on file    Years of education: Not on file    Highest education level: Not on file   Occupational History    Not on file   Tobacco Use    Smoking status: Never Smoker    Smokeless tobacco: Never Used   Vaping Use    Vaping Use: Never used   Substance and Sexual Activity    Alcohol use: No    Drug use: No    Sexual activity: Yes     Partners: Male     Birth control/protection: Injection   Other Topics Concern    Not on file   Social History Narrative    Not on file     Social Determinants of Health     Financial Resource Strain:     Difficulty of Paying Living Expenses:    Food Insecurity:     Worried About Running Out of Food in the Last Year:     Ran Out of Food in the Last Year:    Transportation Needs:     Lack of Transportation (Medical):      Lack of Transportation (Non-Medical):    Physical Activity:     Days of Exercise per Week:     Minutes of Exercise per Session:    Stress:     Feeling of Stress :    Social Connections:     Frequency of Communication with Friends and Family:     Frequency of Social Gatherings with Friends and Family:     Attends Scientologist Services:     Active Member of Clubs or Organizations:     Attends Club or Organization Meetings:     Marital Status:    Intimate Partner Violence:     Fear of Current or Ex-Partner:     Emotionally Abused:     Physically Abused:     Sexually Abused:      Prior Level of Function/Work/Activity:  Normal  Patient has been out of work since initial injury  Other Clinical Tests  MRI Positive   Previous Treatment Approach   Previous Physical Therapy   Current Medications:    Current Outpatient Medications:     cephALEXin (KEFLEX) 500 mg capsule, Take 1 Cap by mouth four (4) times daily. Please start the antibiotic the day after surgery x 3 days, Disp: 12 Cap, Rfl: 0    medroxyPROGESTERone (Depo-Provera) 150 mg/mL syrg, 1 mL by IntraMUSCular route every three (3) months., Disp: 1 Syringe, Rfl: 3     Date Last Reviewed:  6/2/2021       Ambulatory/Rehab Services H2 Model Falls Risk Assessment    Risk Factors:       No Risk Factors Identified Ability to Rise from Chair:       (3)  Multiple attempts, but successful    Falls Prevention Plan:       No modifications necessary   Total: (5 or greater = High Risk): 3    ©2010 [facility] [employer]. All Rights Reserved. [facility] Patent #[id number].  Federal Law prohibits the replication, distribution or use without written permission from [facility] [employer]        Number of Personal Factors/Comorbidities that affect the Plan of Care: 0: LOW COMPLEXITY   EXAMINATION:   Observation/Orthostatic Postural Assessment:  Assessed @ Initial Visit:        Gait:  step to decreased minh antalgic difficulty/unable to weight shift in boot    Standing: decreased WB right     Palpation:  Assessed @ Initial Visit: Tenderness to medial right foot    Girth Measurement:   Joint: Eval Date: 6/2/2021  Re-Assess Date:  Re-Assess Date:    Ankle Figure 8 Measurement RIGHT LEFT RIGHT LEFT RIGHT LEFT    49 cm 48 cm                AROM/PROM         Joint: Eval Date: 6/2/2021  Re-Assess Date:  Re-Assess Date:    Active ROM RIGHT LEFT RIGHT LEFT RIGHT LEFT   Ankle Dorsiflexion 6 15           Ankle Plantarflexion 45 55           Ankle Inversion 23 35           Ankle Eversion 10 22                                     Passive ROM             Ankle Dorsiflexion NT WNL           Ankle Plantarflexion NT WNL           Ankle Inversion NT WNL           Ankle Eversion NT WNL             Strength:     Eval Date:6/2/2021  Re-Assess Date:  Re-Assess Date:      RIGHT LEFT RIGHT LEFT RIGHT LEFT   Ankle Dorsiflexion 4-/5 4+/5           Ankle Plantarflexion 3+/5 4+/5           Ankle Inversion 3/5 4+/5           Ankle Eversion 3+/5 4+/5           Knee Flexion 4/5 5/5           Knee Extension 4/5 5/5                          Joint Mobility Eval Date: 6/2/2021  Re-Assess Date:  Re-Assess Date:     RIGHT LEFT RIGHT LEFT RIGHT LEFT   Subtalar Hypomobile Normal           Talocrual Hypomobile Normal                                        Special Tests:   Not tested post surgical    Neurological Screen: Assessed @ Initial Visit No radiating symptoms down leg    Functional Mobility:  Assessed @ Initial Visit Limited tolerance of walking and standing  Heel/Toe walking= NT  Calf Raise (Dbl/Single)=  SL =NT    DL=NT  Balance and Mobility:    Test Result   Timed up and Go 18 Seconds   30 second Sit to Stand 7   Single Leg Balance Right: <6 seconds     Left:<30 seconds            Body Structures Involved:  1. Bones  2. Joints  3. Muscles  4. Ligaments Body Functions Affected:  1. Sensory/Pain  2. Neuromusculoskeletal  3. Movement Related Activities and Participation Affected:  1. Mobility  2.  Self Care   Number of elements that affect the Plan of Care: 1-2: LOW COMPLEXITY   CLINICAL PRESENTATION:   Presentation: Stable and uncomplicated: LOW COMPLEXITY   CLINICAL DECISION MAKING:   Use of outcome tool(s) and clinical judgement create a POC that gives a: Clear prediction of patient's progress: LOW COMPLEXITY       See associated treatment note for treatment provided today.            Future Appointments   Date Time Provider Shukri Ana   6/7/2021  1:45 PM Sonali Ada, PT [facility]   6/10/2021  1:45 PM Sonali Ada, [facility]   6/14/2021  1:45 PM Sonali Ada, PT [facility]   6/17/2021  1:45 PM Sonali Ada, PT [facility]   6/22/2021  1:45 PM Sonali Ada, PT [facility]   6/24/2021  1:45 PM Sonali Ada, PT [facility]   6/28/2021  1:45 PM Sonali Ada, PT [facility]   7/1/2021  1:45 PM Sonali Ada, PT [facility]   7/2/2021 11:00 AM Maame Gorman MD [facility]   7/6/2021  1:45 PM Sonali Ada, PT [facility]   7/8/2021  1:45 PM Sonali Ada, PT [facility]   7/12/2021  1:45 PM Sonali Ada, PT [facility]   7/21/2021  3:45 PM [facility]   11/4/2021 10:15 AM MD Carlee Vazquez [place]

## 2021-06-02 NOTE — PROGRESS NOTES
Saray Bello  : 1995  Payor: Lizbeth Johnson / [place] [employer] [place] [employer] / Product Type: Workers Comp /  [address] [facility] at [address] [place] [address] [address], [id number] [place] [address]  [phone]   [phone]                                                          Trinity Valentin MD      OUTPATIENT PHYSICAL THERAPY: Daily Treatment Note 2021 Visit Count:  1    ICD-10: Treatment Diagnosis:  Pain in right ankle and joints of right foot (M25.571)  Posterior tibial tendinitis, right leg (S83.897)  Encounter for other specified surgical aftercare (Z48.89)  Other abnormalities of gait and mobility (R26.89)     Precautions/Allergies:   Patient has no known allergies. Fall Risk Score: 3 (? 5 = High Risk)  MD Orders: Eval and Treat  MEDICAL/REFERRING DIAGNOSIS:  Posterior tibial tendinitis, right leg [M76.821]   DATE OF ONSET: Patient fell at work in early , current right surgery for Posterior Tibial tendonitis 2021  REFERRING PHYSICIAN: Trinity Valentin MD  [address] [place] [address] 2021     PROCEDURE: 1.  Right medial displacement calcaneal osteotomy                             2.  Right flexor digitorum longus tendon transfer                             3.  Right spring ligament reconstruction of the ankle                             4.  Right posterior tibial tendon debridement with advancement of tendon                             0.  PMHLP gastrocnemius recession         Pre-treatment Symptoms/Complaints: See Initial Eval Dated 2021 for more details. Pain: Initial:6/10  Medications Last Reviewed:  2021     Post Session: 6/10   Updated Objective Findings: See Initial Eval for more details. TREATMENT:   THERAPEUTIC EXERCISE: (15 minutes):  Exercises per grid below to improve mobility, strength and balance. Required minimal visual, verbal and manual cues to promote proper body alignment and promote proper body posture.   Progressed resistance and complexity of movement as indicated. Date:  6/2/2021 Date:   Date:     Activity/Exercise Parameters Parameters Parameters   Education HEP, POC, PT goals, anatomy/pathology     Ankle Pumps  20x     Ankle circles  20x     Inversion Eversion 20x     Toe Curl 20x     Towel Curl 2min                 THERAPEUTIC ACTIVITY: ( 0 minutes): Activities per gid below to improve functional movement related mobility, strength and balance to improve neuro-muscular carryover to daily functional activities for improving patient's quality of life. Required visual, verbal and manual cues to promote proper body alignment and promote proper body posture/mechanics. Progressed resistance and complexity of movement as indicated. Date:  6/2/2021 Date:   Date:     Activity/Exercise Parameters Parameters Parameters                                                                               MANUAL THERAPY: (8 minutes): Joint mobilization, Soft tissue mobilization was utilized and necessary because of the patient's restricted joint motion and restricted motion of soft tissue mobility. Date  6/2/2021    Technique Used Grade  Level # Time(s) Effect while being performed          Tissue/scar mobilization  Right ankle 4min Improved mobility          Joint mobilization II Right ankle 4min Improved mobility                                     HEP Log Date 1.    6/2/2021   2.  6/2/2021   3. 6/2/2021   4.    5.           [employer] Portal  Treatment/Session Summary:    Response to Treatment: Pt demonstrated understanding of POC and initial HEP. No increase in pain or adverse reactions.    Communication/Consultation:  POC, HEP, PT goals, Faxed initial evaluation to MD.   Equipment provided today: HEP Handout   Recommendations/Intent for next treatment session:   Next visit will focus on Manual Therapy Core Stability Pain Science Education Quad strengthening Hip strengthening soft tissue mobilization Gait. Treatment Plan of Care Effective Dates: 6/2/2021 TO 8/31/2021 (90 days).   Frequency/Duration: 2 times a week for 90 Days             Total Treatment Billable Duration:   23  Rx plus Eval   PT Patient Time In/Time Out  Time In: 1355  Time Out: 134 Simran Stern, PT    Future Appointments   Date Time Provider Shukri Perez   6/7/2021  1:45 PM Adrian Cancion, PT [facility]   6/10/2021  1:45 PM Adrian Cancino, PT [facility]   6/14/2021  1:45 PM Adrianchristianne Cancino, PT [facility]   6/17/2021  1:45 PM Adrian Cancino, PT [facility]   6/22/2021  1:45 PM Adrian Greening, PT [facility]   6/24/2021  1:45 PM Adrian Cancino, PT [facility]   6/28/2021  1:45 PM Adrian Cancino, PT [facility]   7/1/2021  1:45 PM Adrian Cancino, PT [facility]   7/2/2021 11:00 AM Nikhil Willoughby, Keturah Macdonald MD [facility]   7/6/2021  1:45 PM Adrian Cancino, PT [facility]   7/8/2021  1:45 PM Adrian Cancino, PT [facility]   7/12/2021  1:45 PM Adrain Cancino, PT [facility]   7/21/2021  3:45 PM [facility]   11/4/2021 10:15 AM MD Verona Mc

## 2021-06-03 ENCOUNTER — ANESTHESIA EVENT (OUTPATIENT)
Dept: SURGERY | Age: 26
End: 2021-06-03
Payer: COMMERCIAL

## 2021-06-03 PROBLEM — N87.1 MODERATE DYSPLASIA OF CERVIX (CIN II): Status: ACTIVE | Noted: 2021-06-03

## 2021-06-03 NOTE — H&P
Gynecology History and Physical    Name: Patience Sanchez MRN: [id number] SSN: [id number]    YOB: 1995  Age: 32 y.o. Sex: female       Subjective:      Chief complaint:  Cervical dysplasia    Michael Marrero is a 32 y.o.  female with a history of ASC-H pap smear. Previous workup included a colposcopy which revealed moderate dysplasia. Previous treatment measures included none. She is admitted for Procedure(s) (LRB):  LOOP ELECTRODE EXCISION PROCEDURE OF CERVIX LEEP (N/A). The current method of family planning is Depo-Provera injections.     OB History        0    Para   0    Term   0       0    AB   0    Living   0       SAB   0    TAB   0    Ectopic   0    Molar   0    Multiple   0    Live Births   0              Past Medical History:   Diagnosis Date    Abnormal Pap smear of cervix     Chest pain     COVID-19 2021    not hospitalized; no respiratory symptoms    Fracture of left foot 2019    GERD (gastroesophageal reflux disease)     omeprazole daily, 2 pillows -     History of trichomonal vaginitis 2018    Posterior tibial tendonitis     right leg    Psychiatric disorder     panic attacks; resolved    PVC (premature ventricular contraction)      Past Surgical History:   Procedure Laterality Date    HX ORTHOPAEDIC Left 2020    LEFT FOOT KIDNER PROCEDURE     HX ORTHOPAEDIC Right 10/2020    Right foot kidner procedure and sprained ligament repair    HX ORTHOPAEDIC Right 2021    right spring ligament reconstruction    HX WISDOM TEETH EXTRACTION      HX WISDOM TEETH EXTRACTION       Social History     Occupational History    Not on file   Tobacco Use    Smoking status: Never Smoker    Smokeless tobacco: Never Used   Vaping Use    Vaping Use: Never used   Substance and Sexual Activity    Alcohol use: No    Drug use: No    Sexual activity: Yes     Partners: Male     Birth control/protection: Injection     Family History   Problem Relation Age of Onset    No Known Problems Mother     No Known Problems Father     No Known Problems Sister     No Known Problems Brother     Hypertension Maternal Grandmother     Thyroid Disease Maternal Grandmother     Heart Surgery Maternal Grandmother     No Known Problems Maternal Grandfather     No Known Problems Paternal Grandmother     No Known Problems Paternal Grandfather     Diabetes Maternal Aunt     Diabetes Maternal Aunt         No Known Allergies  Prior to Admission medications    Medication Sig Start Date End Date Taking? Authorizing Provider   cephALEXin (KEFLEX) 500 mg capsule Take 1 Cap by mouth four (4) times daily. Please start the antibiotic the day after surgery x 3 days 4/8/21   Nallely Park MD   medroxyPROGESTERone (Depo-Provera) 150 mg/mL syrg 1 mL by IntraMUSCular route every three (3) months. 2/17/21   David Wade MD        Review of Systems:  A comprehensive review of systems was negative except for that written in the History of Present Illness. Objective: There were no vitals filed for this visit. Physical Exam:  Patient without distress. Heart: Regular rate and rhythm  Lung: clear to auscultation throughout lung fields, no wheezes, no rales, no rhonchi and normal respiratory effort  Back: costovertebral angle tenderness absent  Abdomen: soft, nontender    Assessment:     Principal Problem: Moderate dysplasia of cervix (MIGUELITO II) (6/3/2021)       Cervical dysplasia    Plan:     Procedure(s) (LRB):  LOOP ELECTRODE EXCISION PROCEDURE OF CERVIX LEEP (N/A)  Discussed the risks of surgery including the risks of bleeding, infection, deep vein thrombosis, and surgical injuries to internal organs including but not limited to the bowels, bladder, rectum, and female reproductive organs. The patient understands the risks; any and all questions were answered to the patient's satisfaction.

## 2021-06-04 ENCOUNTER — HOSPITAL ENCOUNTER (OUTPATIENT)
Age: 26
Setting detail: OUTPATIENT SURGERY
Discharge: HOME | End: 2021-06-04
Attending: OBSTETRICS & GYNECOLOGY | Admitting: OBSTETRICS & GYNECOLOGY
Payer: COMMERCIAL

## 2021-06-04 ENCOUNTER — ANESTHESIA (OUTPATIENT)
Dept: SURGERY | Age: 26
End: 2021-06-04
Payer: COMMERCIAL

## 2021-06-04 VITALS
SYSTOLIC BLOOD PRESSURE: 101 MMHG | HEART RATE: 77 BPM | OXYGEN SATURATION: 99 % | BODY MASS INDEX: 29.26 KG/M2 | RESPIRATION RATE: 15 BRPM | WEIGHT: 159 LBS | HEIGHT: 62 IN | DIASTOLIC BLOOD PRESSURE: 55 MMHG | TEMPERATURE: 97.2 F

## 2021-06-04 DIAGNOSIS — N87.1 MODERATE DYSPLASIA OF CERVIX (CIN II): ICD-10-CM

## 2021-06-04 LAB
ERYTHROCYTE [DISTWIDTH] IN BLOOD BY AUTOMATED COUNT: 12.8 % (ref 11.9–14.6)
HCG UR QL: NEGATIVE
HCT VFR BLD AUTO: 43.6 % (ref 35.8–46.3)
HGB BLD-MCNC: 14.9 G/DL (ref 11.7–15.4)
MCH RBC QN AUTO: 29.3 PG (ref 26.1–32.9)
MCHC RBC AUTO-ENTMCNC: 34.2 G/DL (ref 31.4–35)
MCV RBC AUTO: 85.7 FL (ref 79.6–97.8)
NRBC # BLD: 0 K/UL (ref 0–0.2)
PLATELET # BLD AUTO: 378 K/UL (ref 150–450)
PMV BLD AUTO: 10 FL (ref 9.4–12.3)
RBC # BLD AUTO: 5.09 M/UL (ref 4.05–5.2)
WBC # BLD AUTO: 8.6 K/UL (ref 4.3–11.1)

## 2021-06-04 PROCEDURE — 88307 TISSUE EXAM BY PATHOLOGIST: CPT

## 2021-06-04 PROCEDURE — 85027 COMPLETE CBC AUTOMATED: CPT

## 2021-06-04 PROCEDURE — 74011250636 HC RX REV CODE- 250/636: Performed by: ANESTHESIOLOGY

## 2021-06-04 PROCEDURE — 81025 URINE PREGNANCY TEST: CPT

## 2021-06-04 PROCEDURE — 57522 CONIZATION OF CERVIX: CPT | Performed by: OBSTETRICS & GYNECOLOGY

## 2021-06-04 PROCEDURE — 76010000138 HC OR TIME 0.5 TO 1 HR: Performed by: OBSTETRICS & GYNECOLOGY

## 2021-06-04 PROCEDURE — 74011250636 HC RX REV CODE- 250/636: Performed by: NURSE ANESTHETIST, CERTIFIED REGISTERED

## 2021-06-04 PROCEDURE — 76210000020 HC REC RM PH II FIRST 0.5 HR: Performed by: OBSTETRICS & GYNECOLOGY

## 2021-06-04 PROCEDURE — 76210000006 HC OR PH I REC 0.5 TO 1 HR: Performed by: OBSTETRICS & GYNECOLOGY

## 2021-06-04 PROCEDURE — 74011250637 HC RX REV CODE- 250/637: Performed by: ANESTHESIOLOGY

## 2021-06-04 PROCEDURE — 77030010509 HC AIRWY LMA MSK TELE -A: Performed by: ANESTHESIOLOGY

## 2021-06-04 PROCEDURE — 76060000032 HC ANESTHESIA 0.5 TO 1 HR: Performed by: OBSTETRICS & GYNECOLOGY

## 2021-06-04 PROCEDURE — 74011000250 HC RX REV CODE- 250: Performed by: NURSE ANESTHETIST, CERTIFIED REGISTERED

## 2021-06-04 RX ORDER — LIDOCAINE HYDROCHLORIDE 10 MG/ML
0.1 INJECTION INFILTRATION; PERINEURAL AS NEEDED
Status: DISCONTINUED | OUTPATIENT
Start: 2021-06-04 | End: 2021-06-04 | Stop reason: HOSPADM

## 2021-06-04 RX ORDER — PROPOFOL 10 MG/ML
INJECTION, EMULSION INTRAVENOUS AS NEEDED
Status: DISCONTINUED | OUTPATIENT
Start: 2021-06-04 | End: 2021-06-04 | Stop reason: HOSPADM

## 2021-06-04 RX ORDER — NALOXONE HYDROCHLORIDE 0.4 MG/ML
0.04 INJECTION, SOLUTION INTRAMUSCULAR; INTRAVENOUS; SUBCUTANEOUS
Status: DISCONTINUED | OUTPATIENT
Start: 2021-06-04 | End: 2021-06-04 | Stop reason: HOSPADM

## 2021-06-04 RX ORDER — LORAZEPAM 2 MG/ML
1 INJECTION INTRAMUSCULAR
Status: DISCONTINUED | OUTPATIENT
Start: 2021-06-04 | End: 2021-06-04 | Stop reason: HOSPADM

## 2021-06-04 RX ORDER — SODIUM CHLORIDE, SODIUM LACTATE, POTASSIUM CHLORIDE, CALCIUM CHLORIDE 600; 310; 30; 20 MG/100ML; MG/100ML; MG/100ML; MG/100ML
100 INJECTION, SOLUTION INTRAVENOUS CONTINUOUS
Status: DISCONTINUED | OUTPATIENT
Start: 2021-06-04 | End: 2021-06-04 | Stop reason: HOSPADM

## 2021-06-04 RX ORDER — MIDAZOLAM HYDROCHLORIDE 1 MG/ML
2 INJECTION, SOLUTION INTRAMUSCULAR; INTRAVENOUS ONCE
Status: COMPLETED | OUTPATIENT
Start: 2021-06-04 | End: 2021-06-04

## 2021-06-04 RX ORDER — DEXAMETHASONE SODIUM PHOSPHATE 4 MG/ML
INJECTION, SOLUTION INTRA-ARTICULAR; INTRALESIONAL; INTRAMUSCULAR; INTRAVENOUS; SOFT TISSUE AS NEEDED
Status: DISCONTINUED | OUTPATIENT
Start: 2021-06-04 | End: 2021-06-04 | Stop reason: HOSPADM

## 2021-06-04 RX ORDER — SODIUM CHLORIDE 0.9 % (FLUSH) 0.9 %
5-40 SYRINGE (ML) INJECTION AS NEEDED
Status: DISCONTINUED | OUTPATIENT
Start: 2021-06-04 | End: 2021-06-04 | Stop reason: HOSPADM

## 2021-06-04 RX ORDER — KETOROLAC TROMETHAMINE 30 MG/ML
INJECTION, SOLUTION INTRAMUSCULAR; INTRAVENOUS AS NEEDED
Status: DISCONTINUED | OUTPATIENT
Start: 2021-06-04 | End: 2021-06-04 | Stop reason: HOSPADM

## 2021-06-04 RX ORDER — MIDAZOLAM HYDROCHLORIDE 1 MG/ML
2 INJECTION, SOLUTION INTRAMUSCULAR; INTRAVENOUS
Status: DISCONTINUED | OUTPATIENT
Start: 2021-06-04 | End: 2021-06-04 | Stop reason: HOSPADM

## 2021-06-04 RX ORDER — LIDOCAINE HYDROCHLORIDE 20 MG/ML
INJECTION, SOLUTION EPIDURAL; INFILTRATION; INTRACAUDAL; PERINEURAL AS NEEDED
Status: DISCONTINUED | OUTPATIENT
Start: 2021-06-04 | End: 2021-06-04 | Stop reason: HOSPADM

## 2021-06-04 RX ORDER — FENTANYL CITRATE 50 UG/ML
100 INJECTION, SOLUTION INTRAMUSCULAR; INTRAVENOUS ONCE
Status: DISCONTINUED | OUTPATIENT
Start: 2021-06-04 | End: 2021-06-04 | Stop reason: HOSPADM

## 2021-06-04 RX ORDER — FENTANYL CITRATE 50 UG/ML
INJECTION, SOLUTION INTRAMUSCULAR; INTRAVENOUS AS NEEDED
Status: DISCONTINUED | OUTPATIENT
Start: 2021-06-04 | End: 2021-06-04 | Stop reason: HOSPADM

## 2021-06-04 RX ORDER — OXYCODONE HYDROCHLORIDE 5 MG/1
5 TABLET ORAL
Status: COMPLETED | OUTPATIENT
Start: 2021-06-04 | End: 2021-06-04

## 2021-06-04 RX ORDER — SODIUM CHLORIDE 0.9 % (FLUSH) 0.9 %
5-40 SYRINGE (ML) INJECTION EVERY 8 HOURS
Status: DISCONTINUED | OUTPATIENT
Start: 2021-06-04 | End: 2021-06-04 | Stop reason: HOSPADM

## 2021-06-04 RX ORDER — HYDROMORPHONE HYDROCHLORIDE 1 MG/ML
0.5 INJECTION, SOLUTION INTRAMUSCULAR; INTRAVENOUS; SUBCUTANEOUS
Status: DISCONTINUED | OUTPATIENT
Start: 2021-06-04 | End: 2021-06-04 | Stop reason: HOSPADM

## 2021-06-04 RX ORDER — ONDANSETRON 2 MG/ML
INJECTION INTRAMUSCULAR; INTRAVENOUS AS NEEDED
Status: DISCONTINUED | OUTPATIENT
Start: 2021-06-04 | End: 2021-06-04 | Stop reason: HOSPADM

## 2021-06-04 RX ADMIN — FENTANYL CITRATE 50 MCG: 50 INJECTION INTRAMUSCULAR; INTRAVENOUS at 09:45

## 2021-06-04 RX ADMIN — OXYCODONE 5 MG: 5 TABLET ORAL at 10:27

## 2021-06-04 RX ADMIN — SODIUM CHLORIDE, SODIUM LACTATE, POTASSIUM CHLORIDE, AND CALCIUM CHLORIDE 100 ML/HR: 600; 310; 30; 20 INJECTION, SOLUTION INTRAVENOUS at 07:55

## 2021-06-04 RX ADMIN — MIDAZOLAM 2 MG: 1 INJECTION INTRAMUSCULAR; INTRAVENOUS at 08:33

## 2021-06-04 RX ADMIN — FENTANYL CITRATE 50 MCG: 50 INJECTION INTRAMUSCULAR; INTRAVENOUS at 09:33

## 2021-06-04 RX ADMIN — KETOROLAC TROMETHAMINE 30 MG: 30 INJECTION, SOLUTION INTRAMUSCULAR; INTRAVENOUS at 09:53

## 2021-06-04 RX ADMIN — ONDANSETRON 4 MG: 2 INJECTION INTRAMUSCULAR; INTRAVENOUS at 09:39

## 2021-06-04 RX ADMIN — DEXAMETHASONE SODIUM PHOSPHATE 8 MG: 4 INJECTION, SOLUTION INTRAMUSCULAR; INTRAVENOUS at 09:39

## 2021-06-04 RX ADMIN — LIDOCAINE HYDROCHLORIDE 100 MG: 20 INJECTION, SOLUTION EPIDURAL; INFILTRATION; INTRACAUDAL; PERINEURAL at 09:33

## 2021-06-04 RX ADMIN — PROPOFOL 200 MG: 10 INJECTION, EMULSION INTRAVENOUS at 09:33

## 2021-06-04 NOTE — PROGRESS NOTES
Spiritual Care visit. Initial Visit, Pre Surgery Consult. Visit and prayer before patient goes to surgery.     Visit by Armida Alves M.Ed., Th.B. ,Staff

## 2021-06-04 NOTE — ANESTHESIA PREPROCEDURE EVALUATION
Relevant Problems   No relevant active problems       Anesthetic History   No history of anesthetic complications            Review of Systems / Medical History  Patient summary reviewed, nursing notes reviewed and pertinent labs reviewed    Pulmonary  Within defined limits                 Neuro/Psych   Within defined limits           Cardiovascular            Dysrhythmias : PVC      Exercise tolerance: >4 METS  Comments: Stress Echo 2018- EF 55%, no ST changes no RWMA, normal stress scho   GI/Hepatic/Renal     GERD: well controlled           Endo/Other  Within defined limits           Other Findings   Comments: Covid 4/21- no hospitalization, mild cough, fully recovered.            Physical Exam    Airway  Mallampati: II  TM Distance: 4 - 6 cm  Neck ROM: normal range of motion   Mouth opening: Normal     Cardiovascular  Regular rate and rhythm,  S1 and S2 normal,  no murmur, click, rub, or gallop             Dental  No notable dental hx       Pulmonary  Breath sounds clear to auscultation               Abdominal         Other Findings            Anesthetic Plan    ASA: 2  Anesthesia type: general          Induction: Intravenous  Anesthetic plan and risks discussed with: Patient

## 2021-06-04 NOTE — ANESTHESIA POSTPROCEDURE EVALUATION
Procedure(s):  LOOP ELECTRODE EXCISION PROCEDURE OF CERVIX LEEP.     general    Anesthesia Post Evaluation        Patient location during evaluation: PACU  Patient participation: complete - patient participated  Level of consciousness: awake  Pain management: satisfactory to patient  Airway patency: patent  Anesthetic complications: no  Cardiovascular status: hemodynamically stable  Respiratory status: spontaneous ventilation  Hydration status: euvolemic  Post anesthesia nausea and vomiting:  none      INITIAL Post-op Vital signs:   Vitals Value Taken Time   /55 06/04/21 1033   Temp 36.2 °C (97.2 °F) 06/04/21 1003   Pulse 77 06/04/21 1033   Resp 15 06/04/21 1033   SpO2 99 % 06/04/21 1033

## 2021-06-04 NOTE — DISCHARGE INSTRUCTIONS
Patient Education        Loop Electrosurgical Excision Procedure (LEEP): What to Expect at Home  Your Recovery     LEEP removes tissue from the cervix. Your procedure removed abnormal tissue from your cervix. You may have mild cramping for several hours after the procedure. A dark brown vaginal discharge during the first week is normal. You can use a sanitary pad for the bleeding. You may also have some spotting for about 3 weeks. How long it takes to recover will depend on how much was done during the procedure. This care sheet gives you a general idea about how long it will take for you to recover. But each person recovers at a different pace. Follow the steps below to feel better as quickly as possible. How can you care for yourself at home? Activity    · You should be able to go back to your normal activities in 1 to 3 days. Medicines    · Your doctor will tell you if and when you can restart your medicines. He or she will also give you instructions about taking any new medicines.     · If you take aspirin or some other blood thinner, ask your doctor if and when to start taking it again. Make sure that you understand exactly what your doctor wants you to do.     · Take an over-the-counter pain medicine, such as acetaminophen (Tylenol), ibuprofen (Advil, Motrin), or naproxen (Aleve). Read and follow all instructions on the label.     · Do not take two or more pain medicines at the same time unless the doctor told you to. Many pain medicines have acetaminophen, which is Tylenol. Too much acetaminophen (Tylenol) can be harmful. Exercise    · Do not exercise for 1 to 3 days after the procedure. Other instructions    · Use a sanitary pad if you have bleeding.     · Do not have sexual intercourse or use tampons for 3 weeks. Do not douche. This will allow your cervix to heal.     · You can take a shower anytime after the procedure.  Ask your doctor when it is okay to take a bath.     · Be sure to have regular follow-up Pap tests. Your doctor can tell you how often you need to have Pap tests. Follow-up care is a key part of your treatment and safety. Be sure to make and go to all appointments, and call your doctor if you are having problems. It's also a good idea to know your test results and keep a list of the medicines you take. When should you call for help? Call 911 anytime you think you may need emergency care. For example, call if:    · You passed out (lost consciousness).     · You have chest pain, are short of breath, or cough up blood. Call your doctor now or seek immediate medical care if:    · You have pain that does not get better after you take pain medicine.     · You cannot pass stools or gas.     · You have signs of infection, such as:  ? Increased pain, swelling, warmth, or redness. ? A fever.     · You have bright red vaginal bleeding that soaks one or more pads in an hour, or you have large clots.     · You have vaginal discharge that has increased in amount or smells bad.     · You are sick to your stomach or cannot drink fluids.     · You have signs of a blood clot in your leg (called a deep vein thrombosis), such as:  ? Pain in your calf, back of the knee, thigh, or groin. ? Redness or swelling in your leg. Watch closely for any changes in your health, and be sure to contact your doctor if you have any problems. Where can you learn more? Go to http://www.gray.com/  Enter Y487 in the search box to learn more about \"Loop Electrosurgical Excision Procedure (LEEP): What to Expect at Home. \"  Current as of: December 17, 2020               Content Version: 12.8  © 4828-6952 [employer]. Care instructions adapted under license by [employer] (which disclaims liability or warranty for this information).  If you have questions about a medical condition or this instruction, always ask your healthcare professional. [address] any warranty or liability for your use of this information. MEDICATION INTERACTION:  During your procedure you potentially received a medication or medications which may reduce the effectiveness of oral contraceptives. Please consider other forms of contraception for 1 month following your procedure if you are currently using oral contraceptives as your primary form of birth control. In addition to this, we recommend continuing your oral contraceptive as prescribed, unless otherwise instructed by your physician, during this time    After general anesthesia or intravenous sedation, for 24 hours or while taking prescription Narcotics:  · Limit your activities  · A responsible adult needs to be with you for the next 24 hours  · Do not drive and operate hazardous machinery  · Do not make important personal or business decisions  · Do not drink alcoholic beverages  · If you have not urinated within 8 hours after discharge, please contact your surgeon on call. · If you have sleep apnea and have a CPAP machine, please use it for all naps and sleeping. · Please use caution when taking narcotics and any of your home medications that may cause drowsiness. *  Please give a list of your current medications to your Primary Care Provider. *  Please update this list whenever your medications are discontinued, doses are      changed, or new medications (including over-the-counter products) are added. *  Please carry medication information at all times in case of emergency situations. These are general instructions for a healthy lifestyle:  No smoking/ No tobacco products/ Avoid exposure to second hand smoke  Surgeon General's Warning:  Quitting smoking now greatly reduces serious risk to your health.   Obesity, smoking, and sedentary lifestyle greatly increases your risk for illness  A healthy diet, regular physical exercise & weight monitoring are important for maintaining a healthy lifestyle    You may be retaining fluid if you have a history of heart failure or if you experience any of the following symptoms:  Weight gain of 3 pounds or more overnight or 5 pounds in a week, increased swelling in our hands or feet or shortness of breath while lying flat in bed. Please call your doctor as soon as you notice any of these symptoms; do not wait until your next office visit.

## 2021-06-04 NOTE — OP NOTES
LEEP Exam    2021     Name:  Shoaib Stevenson    :  1995 Age:  32 y.o. Contraceptive method:  Depo-Provera injections  LMP:  No LMP recorded. (Menstrual status: Medically Induced). UCG: negative  Allergies:  No Known Allergies    Preoperative diagnosis:  Moderate dysplasia  Postoperative diagnosis: same  Procedure: Loop electrical excision of the transformation zone  Surgeon: Brian Han. Silva Moore MD  Anesthesia: General  EBL: < 5ml    Procedure:  Patient was informed of the risk of the procedure including bleeding, infection, cervical incompetence and pre-term labor and delivery. Additionally she was informed of the 92-95% cure rate and the likely need for frequent pap smears for 1-2 years following the procedure. After general anesthesia was obtained, patient's identification and nature of the procedure was confirmed, the patient was placed in the lithotomy position. Speculum was inserted and Bovie pad applied. Acetic acid was not applied. Lugol's was applied. Using settings of 40 coag/ 40 cut blended a 8 mm loop was used to removed the transformation zone in 1 pass(es). Additional pass(es):  none. Base was coagulated with a ball electrode. Monsel's was applied with good hemostasis. Patient was given pelvic rest, bleeding and infection precautions. Return in 2 weeks.     Lance Roa MD

## 2021-06-07 ENCOUNTER — HOSPITAL ENCOUNTER (OUTPATIENT)
Dept: PHYSICAL THERAPY | Age: 26
Discharge: HOME | End: 2021-06-07
Payer: COMMERCIAL

## 2021-06-07 PROCEDURE — 97110 THERAPEUTIC EXERCISES: CPT

## 2021-06-07 PROCEDURE — 97140 MANUAL THERAPY 1/> REGIONS: CPT

## 2021-06-07 NOTE — PROGRESS NOTES
Angie Shaver  : 1995  Payor: Raimundo Ip / [place] [employer] / Product Type: Workers Comp /  [address] at [place] [address] [facility] [address], [address]  [phone]   [phone]                                                          Rancho Wilder MD      OUTPATIENT PHYSICAL THERAPY: Daily Treatment Note 2021 Visit Count: 2    ICD-10: Treatment Diagnosis:  Pain in right ankle and joints of right foot (M25.571)  Posterior tibial tendinitis, right leg (B59.264)  Encounter for other specified surgical aftercare (Z48.89)  Other abnormalities of gait and mobility (R26.89)     Precautions/Allergies:   Patient has no known allergies. Fall Risk Score: 3 (? 5 = High Risk)  MD Orders: Eval and Treat  MEDICAL/REFERRING DIAGNOSIS:  Posterior tibial tendinitis, right leg [M76.821]   DATE OF ONSET: Patient fell at work in early , current right surgery for Posterior Tibial tendonitis 2021  REFERRING PHYSICIAN: Rancho Wilder MD  [address] [place] [address] 2021     PROCEDURE: 1.  Right medial displacement calcaneal osteotomy                             2.  Right flexor digitorum longus tendon transfer                             3.  Right spring ligament reconstruction of the ankle                             4.  Right posterior tibial tendon debridement with advancement of tendon                             4.  RFLES gastrocnemius recession         Pre-treatment Symptoms/Complaints: Patient reports continued soreness with walking in boot   Pain: Initial:6/10  Medications Last Reviewed:  2021     Post Session: 6/10   Updated Objective Findings: See Initial Eval for more details. TREATMENT:   THERAPEUTIC EXERCISE: (32 minutes):  Exercises per grid below to improve mobility, strength and balance. Required minimal visual, verbal and manual cues to promote proper body alignment and promote proper body posture. Progressed resistance and complexity of movement as indicated. Date:  6/2/2021 Date:  6/7/2021 Date:     Activity/Exercise Parameters Parameters Parameters   Education HEP, POC, PT goals, anatomy/pathology     Nu Step  8min    Ankle Pumps  20x 20x    Ankle circles  20x 20x    Inversion Eversion 20x     Toe Curl 20x 20x    Towel Curl 2min 2min    Wobble board  4 way 20xeach          SLR  3 way 20x each    Clam   SL  2x10 blue                      THERAPEUTIC ACTIVITY: ( 0 minutes): Activities per gid below to improve functional movement related mobility, strength and balance to improve neuro-muscular carryover to daily functional activities for improving patient's quality of life. Required visual, verbal and manual cues to promote proper body alignment and promote proper body posture/mechanics. Progressed resistance and complexity of movement as indicated. Date:  6/7/2021 Date:   Date:     Activity/Exercise Parameters Parameters Parameters                                                                               MANUAL THERAPY: (8 minutes): Joint mobilization, Soft tissue mobilization was utilized and necessary because of the patient's restricted joint motion and restricted motion of soft tissue mobility.         Date  6/7/2021    Technique Used Grade  Level # Time(s) Effect while being performed          Tissue/scar mobilization  Right ankle 4min Improved mobility          Joint mobilization II Right ankle 4min Improved mobility                                     HEP Log Date 1.    6/7/2021   2.  6/7/2021   3. 6/7/2021   4.    5.           [employer] Portal  Treatment/Session Summary:    Response to Treatment: Patient had improved tolerance to ROM and increased scar mobility   Communication/Consultation:  Exercise progression   Equipment provided today: Not today   Recommendations/Intent for next treatment session:   Next visit will focus on Manual Therapy Core Stability Pain Science Education Quad strengthening Hip strengthening soft tissue mobilization Gait. Treatment Plan of Care Effective Dates: 6/7/2021 TO 8/31/2021 (90 days).   Frequency/Duration: 2 times a week for 90 Days             Total Treatment Billable Duration:   40 min  Rx   PT Patient Time In/Time Out  Time In: 1350  Time Out: 62 Waldo Palacios, JOSE    Future Appointments   Date Time Provider Shukri Arizmendii   6/22/2021  1:45 PM Adrian Cancino, PT [facility] [place] [facility]   6/24/2021  9:15 AM Giselle Hernandez MD [facility]   6/24/2021  1:45 PM Adrian Cancino, PT [facility]   6/28/2021  1:45 PM Adrian Cancino, PT [facility]   7/1/2021  1:45 PM Adrian Cancino, PT [facility]   7/2/2021 11:00 AM Keturah Koroma MD [facility]   7/6/2021  1:45 PM Adrian Cancino, PT [facility]   7/8/2021  1:45 PM Adrian Cancino, PT [facility]   7/12/2021  1:45 PM Adrian Cancino, PT [facility]   7/21/2021  3:45 PM [facility] VD [facility]   11/4/2021 10:15 AM MD Verona Mc

## 2021-06-10 ENCOUNTER — HOSPITAL ENCOUNTER (OUTPATIENT)
Dept: PHYSICAL THERAPY | Age: 26
Discharge: HOME | End: 2021-06-10
Payer: COMMERCIAL

## 2021-06-10 PROCEDURE — 97110 THERAPEUTIC EXERCISES: CPT

## 2021-06-10 PROCEDURE — 97140 MANUAL THERAPY 1/> REGIONS: CPT

## 2021-06-10 NOTE — PROGRESS NOTES
Sheeba Olguin  : 1995  Payor: Jesus Manuel Carter / [place]: [place] [employer] / Product Type: Workers Comp /  [address] at [address] [place] [address], [facility], [address] [place] [address]  [phone]   [phone]                                                          Moo Jeong MD      OUTPATIENT PHYSICAL THERAPY: Daily Treatment Note 6/10/2021 Visit Count:  3    ICD-10: Treatment Diagnosis:  Pain in right ankle and joints of right foot (M25.571)  Posterior tibial tendinitis, right leg (G37.912)  Encounter for other specified surgical aftercare (Z48.89)  Other abnormalities of gait and mobility (R26.89)     Precautions/Allergies:   Patient has no known allergies. Fall Risk Score: 3 (? 5 = High Risk)  MD Orders: Eval and Treat  MEDICAL/REFERRING DIAGNOSIS:  Posterior tibial tendinitis, right leg [M76.821]   DATE OF ONSET: Patient fell at work in early , current right surgery for Posterior Tibial tendonitis 2021  REFERRING PHYSICIAN: Moo Jeong MD  RETURN PHYSICIAN APPOINTMENT 2021     PROCEDURE: 1.  Right medial displacement calcaneal osteotomy                             2.  Right flexor digitorum longus tendon transfer                             3.  Right spring ligament reconstruction of the ankle                             4.  Right posterior tibial tendon debridement with advancement of tendon                             5.  MNOTL gastrocnemius recession         Pre-treatment Symptoms/Complaints: Patient reports less pain and able to walk longer   Pain: Initial:3/10  Medications Last Reviewed:  6/10/2021     Post Session: 2/10   Updated Objective Findings: ROM DF 8        TREATMENT:   THERAPEUTIC EXERCISE: (35 minutes):  Exercises per grid below to improve mobility, strength and balance. Required minimal visual, verbal and manual cues to promote proper body alignment and promote proper body posture.   Progressed resistance and complexity of movement as indicated. Date:  6/2/2021 Date:  6/7/2021 Date:  6/10/2021   Activity/Exercise Parameters Parameters Parameters   Education HEP, POC, PT goals, anatomy/pathology     Nu Step  8min 8min Level 3   Ankle Pumps  20x 20x    Ankle circles  20x 20x    Inversion Eversion 20x     Toe Curl 20x 20x 30x   Towel Curl 2min 2min 2min   Wobble board  4 way 20xeach 4 way 20xeach   Heel toe    Seated 2x10   SLR  3 way 20x each Flex Abd w/boot 3x10 each   Clam   SL  2x10 blue SL  2x10 blue   SB Hamstring curl   3x10   SB LE extension   3x10         THERAPEUTIC ACTIVITY: ( 0 minutes): Activities per gid below to improve functional movement related mobility, strength and balance to improve neuro-muscular carryover to daily functional activities for improving patient's quality of life. Required visual, verbal and manual cues to promote proper body alignment and promote proper body posture/mechanics. Progressed resistance and complexity of movement as indicated. Date:  6/10/2021 Date:   Date:     Activity/Exercise Parameters Parameters Parameters                                                                               MANUAL THERAPY: (8 minutes): Joint mobilization, Soft tissue mobilization was utilized and necessary because of the patient's restricted joint motion and restricted motion of soft tissue mobility.         Date  6/10/2021    Technique Used Grade  Level # Time(s) Effect while being performed          Tissue/scar mobilization  Right ankle 4min Improved mobility          Joint mobilization II Right ankle 4min Improved mobility                                     HEP Log Date 1.    6/10/2021   2.  6/10/2021   3. 6/10/2021   4.    5.           [employer] Portal  Treatment/Session Summary:    Response to Treatment: Patient progressing with WB tolerance and scar mobility   Communication/Consultation:  Exercise progression   Equipment provided today: Not today Recommendations/Intent for next treatment session:   Next visit will focus on Manual Therapy Core Stability Pain Science Education Quad strengthening Hip strengthening soft tissue mobilization Gait. Treatment Plan of Care Effective Dates: 6/2/2021 TO 8/31/2021 (90 days).   Frequency/Duration: 2 times a week for 90 Days             Total Treatment Billable Duration:   43 min  Rx   PT Patient Time In/Time Out  Time In: 1347  Time Out: Janessa Childress, PT    Future Appointments   Date Time Provider Shukri Perez   6/14/2021  1:45 PM Coralee Eglin, PT [facility] [place] [facility]   6/17/2021  1:45 PM Coralee Eglin, PT [facility]   6/22/2021  1:45 PM Coralee Eglin, PT [facility]   6/24/2021  9:15 AM Merlyn Castelan MD [facility]   6/24/2021  1:45 PM Coralee Eglin, PT [facility]   6/28/2021  1:45 PM Coralee Eglin, PT [facility]   7/1/2021  1:45 PM Coralee Eglin, PT [facility]   7/2/2021 11:00 AM Te Brown, Favio Murguia MD [facility] POA   7/6/2021  1:45 PM Coralee Eglin, PT [facility]   7/8/2021  1:45 PM Coralee Eglin, PT [facility]   7/12/2021  1:45 PM Coralee Eglin, PT [facility]   7/21/2021  3:45 PM UPS LAB VD UPSG UPSG   11/4/2021 10:15 AM MD Wendy Kimbrough

## 2021-06-14 ENCOUNTER — HOSPITAL ENCOUNTER (OUTPATIENT)
Dept: PHYSICAL THERAPY | Age: 26
Discharge: HOME | End: 2021-06-14
Payer: COMMERCIAL

## 2021-06-14 NOTE — PROGRESS NOTES
Claudette Venegas  : 1995  [place] [employer]  Secondary:  [address] at [facility]  [address], [address] [facility]  [phone]   [phone]      PHYSICAL THERAPY    Patient unable to attend appointment today.     Nickie Rios, PT

## 2021-06-17 ENCOUNTER — HOSPITAL ENCOUNTER (OUTPATIENT)
Dept: PHYSICAL THERAPY | Age: 26
Discharge: HOME | End: 2021-06-17
Payer: COMMERCIAL

## 2021-06-17 PROCEDURE — 97140 MANUAL THERAPY 1/> REGIONS: CPT

## 2021-06-17 PROCEDURE — 97110 THERAPEUTIC EXERCISES: CPT

## 2021-06-17 NOTE — PROGRESS NOTES
Angie Shaver  : 1995  Payor: Raimundo Ip / [place] [employer] / Product Type: Workers Comp /  Guero Crimes at [address] [place] [address], [facility], [address] [place] [address]  [phone]   [phone]                                                          Rancho Wilder MD      OUTPATIENT PHYSICAL THERAPY: Daily Treatment Note 2021 Visit Count:  4    ICD-10: Treatment Diagnosis:  Pain in right ankle and joints of right foot (M25.571)  Posterior tibial tendinitis, right leg (Q74.624)  Encounter for other specified surgical aftercare (Z48.89)  Other abnormalities of gait and mobility (R26.89)     Precautions/Allergies:   Patient has no known allergies. Fall Risk Score: 3 (? 5 = High Risk)  MD Orders: Eval and Treat  MEDICAL/REFERRING DIAGNOSIS:  Posterior tibial tendinitis, right leg [M76.821]   DATE OF ONSET: Patient fell at work in early , current right surgery for Posterior Tibial tendonitis 2021  REFERRING PHYSICIAN: Rancho Wilder MD  [address] [place] [address] 2021     PROCEDURE: 1.  Right medial displacement calcaneal osteotomy                             2.  Right flexor digitorum longus tendon transfer                             3.  Right spring ligament reconstruction of the ankle                             4.  Right posterior tibial tendon debridement with advancement of tendon                             6.  IXKCM gastrocnemius recession         Pre-treatment Symptoms/Complaints: Patient reports able stand about 30 min, currently 9 wks out   Pain: Initial:2/10  Medications Last Reviewed:  2021     Post Session: 2/10   Updated Objective Findings: ROM DF 9        TREATMENT:   THERAPEUTIC EXERCISE: (35 minutes):  Exercises per grid below to improve mobility, strength and balance. Required minimal visual, verbal and manual cues to promote proper body alignment and promote proper body posture.   Progressed resistance and complexity of movement as indicated. Date:  6/2/2021 Date:  6/7/2021 Date:  6/10/2021 Date:  6/17/2021   Activity/Exercise Parameters Parameters Parameters    Education HEP, POC, PT goals, anatomy/pathology      Nu Step  8min 8min Level 3 8min Level 3   Ankle Pumps  20x 20x     Ankle circles  20x 20x     Inversion Eversion 20x      Toe Curl 20x 20x 30x    Towel Curl 2min 2min 2min 2min   Wobble board  4 way 20xeach 4 way 20xeach 4 way 2x15 each   Heel toe    Seated 2x10 Seated 3x10   SLR  3 way 20x each Flex Abd w/boot 3x10 each SL  2x10 blue Abd   Clam   SL  2x10 blue SL  2x10 blue SL  2x10 blue   SB Hamstring curl   3x10 30x   SB LE extension   3x10 30x   Wt shift    [id number]   Step fwd back    15x         THERAPEUTIC ACTIVITY: ( 0 minutes): Activities per gid below to improve functional movement related mobility, strength and balance to improve neuro-muscular carryover to daily functional activities for improving patient's quality of life. Required visual, verbal and manual cues to promote proper body alignment and promote proper body posture/mechanics. Progressed resistance and complexity of movement as indicated. Date:  6/17/2021 Date:   Date:     Activity/Exercise Parameters Parameters Parameters                                                                               MANUAL THERAPY: (8 minutes): Joint mobilization, Soft tissue mobilization was utilized and necessary because of the patient's restricted joint motion and restricted motion of soft tissue mobility.         Date  6/17/2021    Technique Used Grade  Level # Time(s) Effect while being performed          Tissue/scar mobilization  Right ankle 4min Improved mobility          Joint mobilization II Right ankle 4min Improved mobility                                     HEP Log Date 1.    6/17/2021   2.  6/17/2021   3. 6/17/2021   4.    5.           [employer] Portal  Treatment/Session Summary:    Response to Treatment: Patient remains limited with WB greater than 30 min and continues to require ice and elevation. Communication/Consultation:  WB progression   Equipment provided today: Not today   Recommendations/Intent for next treatment session:   Next visit will focus on Manual Therapy Core Stability Pain Science Education Quad strengthening Hip strengthening soft tissue mobilization Gait. Treatment Plan of Care Effective Dates: 6/2/2021 TO 8/31/2021 (90 days).   Frequency/Duration: 2 times a week for 90 Days             Total Treatment Billable Duration:   43 min  Rx   PT Patient Time In/Time Out  Time In: 1343  Time Out: [address] [facility] [address], PT    Future Appointments   Date Time Provider Shukri Perez   6/22/2021  1:45 PM Jovanna Mcnair, PT [facility] [place] [facility]   6/24/2021  9:15 AM Lior Resendiz MD [facility]   6/24/2021  1:45 PM Jovanna Mcnair, PT [facility]   6/28/2021  1:45 PM Jovanna Mcnair, PT [facility]   7/1/2021  1:45 PM Jovanna Mcnair, PT [facility]   7/2/2021 11:00 AM Abdullahi Ritchie MD [facility]   7/6/2021  1:45 PM Jovanna Mcnair, PT [facility]   7/8/2021  1:45 PM Jovanna Mcnair, PT [facility]   7/12/2021  1:45 PM Jovanna Mcnair, PT [facility]   7/21/2021  3:45 PM UPS LAB VD [facility]   11/4/2021 10:15 AM MD Eugene Brenner

## 2021-06-22 ENCOUNTER — HOSPITAL ENCOUNTER (OUTPATIENT)
Dept: PHYSICAL THERAPY | Age: 26
Discharge: HOME | End: 2021-06-22
Payer: COMMERCIAL

## 2021-06-22 PROCEDURE — 97140 MANUAL THERAPY 1/> REGIONS: CPT

## 2021-06-22 PROCEDURE — 97110 THERAPEUTIC EXERCISES: CPT

## 2021-06-22 NOTE — PROGRESS NOTES
Karthik Beard  : 1995  Payor: Severa Fleck / [place] [employer] / Product Type: Workers Comp /  [address] [facility] at [address] [place] [address] [address] [facility], [address] [place] [address]  [phone]   [phone]                                                          Janie Hughes MD      OUTPATIENT PHYSICAL THERAPY: Daily Treatment Note 2021 Visit Count:  5    ICD-10: Treatment Diagnosis:  Pain in right ankle and joints of right foot (M25.571)  Posterior tibial tendinitis, right leg (R83.431)  Encounter for other specified surgical aftercare (Z48.89)  Other abnormalities of gait and mobility (R26.89)     Precautions/Allergies:   Patient has no known allergies. Fall Risk Score: 3 (? 5 = High Risk)  MD Orders: Eval and Treat  MEDICAL/REFERRING DIAGNOSIS:  Posterior tibial tendinitis, right leg [M76.821]   DATE OF ONSET: Patient fell at work in early , current right surgery for Posterior Tibial tendonitis 2021  REFERRING PHYSICIAN: Janie Hughes MD  [address] [place] [address] 2021     PROCEDURE: 1.  Right medial displacement calcaneal osteotomy                             2.  Right flexor digitorum longus tendon transfer                             3.  Right spring ligament reconstruction of the ankle                             4.  Right posterior tibial tendon debridement with advancement of tendon                             8.  LHDPY gastrocnemius recession         Pre-treatment Symptoms/Complaints: Patient reports increased soreness and pain yesterday and today with no change in activity, \"I think it's the weather\"            :currently 9 weeks out   Pain: Initial:6/10  Medications Last Reviewed:  2021     Post Session: 2/10   Updated Objective Findings: ROM DF 9        TREATMENT:   THERAPEUTIC EXERCISE: (28 minutes):  Exercises per grid below to improve mobility, strength and balance.   Required minimal visual, verbal and manual cues to promote proper body alignment and promote proper body posture. Progressed resistance and complexity of movement as indicated. Date:  6/2/2021 Date:  6/7/2021 Date:  6/10/2021 Date:  6/17/2021 Date:  6/22/2021   Activity/Exercise Parameters Parameters Parameters     Education HEP, POC, PT goals, anatomy/pathology       Nu Step  8min 8min Level 3 8min Level 3 8min Level 3   Ankle Pumps  20x 20x   30x   Ankle circles  20x 20x   30x   Inversion Eversion 20x    30x   Toe Curl 20x 20x 30x  30x   Towel Curl 2min 2min 2min 2min 2min   Wobble board  4 way 20xeach 4 way 20xeach 4 way 2x15 each 4 way 2x15 each   Heel toe    Seated 2x10 Seated 3x10 Seated 2x20   SLR  3 way 20x each Flex Abd w/boot 3x10 each SL  2x10 blue Abd    Clam   SL  2x10 blue SL  2x10 blue SL  2x10 blue    SB Hamstring curl   3x10 30x    SB LE extension   3x10 30x    Wt shift    [id number]    Step fwd back    15x          THERAPEUTIC ACTIVITY: ( 0 minutes): Activities per gid below to improve functional movement related mobility, strength and balance to improve neuro-muscular carryover to daily functional activities for improving patient's quality of life. Required visual, verbal and manual cues to promote proper body alignment and promote proper body posture/mechanics. Progressed resistance and complexity of movement as indicated. Date:  6/22/2021 Date:   Date:     Activity/Exercise Parameters Parameters Parameters                                                                               MANUAL THERAPY: (15 minutes): Joint mobilization, Soft tissue mobilization was utilized and necessary because of the patient's restricted joint motion and restricted motion of soft tissue mobility.         Date  6/22/2021    Technique Used Grade  Level # Time(s) Effect while being performed          Tissue/scar mobilization  Right ankle 10min Improved mobility          Joint mobilization II Right ankle 5min Improved mobility HEP Log Date 1.    6/22/2021   2.  6/22/2021   3. 6/22/2021   4.    5.           [employer] Portal  Treatment/Session Summary:    Response to Treatment: Patient reported decreased pain and tenderness after manual therapy. Communication/Consultation:  WB progression   Equipment provided today: Not today   Recommendations/Intent for next treatment session:   Next visit will focus on Manual Therapy Core Stability Pain Science Education Quad strengthening Hip strengthening soft tissue mobilization Gait. Treatment Plan of Care Effective Dates: 6/2/2021 TO 8/31/2021 (90 days).   Frequency/Duration: 2 times a week for 90 Days             Total Treatment Billable Duration:   43 min  Rx   PT Patient Time In/Time Out  Time In: 1345  Time Out: 96 Simran Peters PT    Future Appointments   Date Time Provider Shukri Perez   6/24/2021  9:15 AM Mitali Rosas MD UPSG UPSG   6/24/2021  1:45 PM Jamel Olson, PT [facility] [place] [facility]   6/28/2021  1:45 PM Jamel Olson, PT [facility]   7/1/2021  1:45 PM Jamel Olson, PT [facility]   7/2/2021 11:00 AM Umesh Barrera MD [facility]   7/6/2021  1:45 PM Jamel Olson, PT [facility]   7/8/2021  1:45 PM Jamel Olson, PT [facility]   7/12/2021  1:45 PM Jamel Olson, PT [facility]   7/21/2021  3:45 PM UPS LAB VD UPS[facility] UPSG   11/4/2021 10:15 AM MD Keenan Douglas

## 2021-06-24 ENCOUNTER — HOSPITAL ENCOUNTER (OUTPATIENT)
Dept: PHYSICAL THERAPY | Age: 26
Discharge: HOME | End: 2021-06-24
Payer: COMMERCIAL

## 2021-06-24 PROCEDURE — 97140 MANUAL THERAPY 1/> REGIONS: CPT

## 2021-06-24 PROCEDURE — 97530 THERAPEUTIC ACTIVITIES: CPT

## 2021-06-24 PROCEDURE — 97110 THERAPEUTIC EXERCISES: CPT

## 2021-06-24 NOTE — PROGRESS NOTES
Domonique Bassett  : 1995  Payor: Osiel Quan / [place] [employer] / Product Type: Workers Comp /  Leeanna Rich at [address], [address]  [phone]   [phone]                                                          Yue Ibarra MD      OUTPATIENT PHYSICAL THERAPY: Daily Treatment Note 2021 Visit Count:  6    ICD-10: Treatment Diagnosis:  Pain in right ankle and joints of right foot (M25.571)  Posterior tibial tendinitis, right leg (K63.865)  Encounter for other specified surgical aftercare (Z48.89)  Other abnormalities of gait and mobility (R26.89)     Precautions/Allergies:   Patient has no known allergies. Fall Risk Score: 3 (? 5 = High Risk)  MD Orders: Eval and Treat  MEDICAL/REFERRING DIAGNOSIS:  Posterior tibial tendinitis, right leg [M76.821]   DATE OF ONSET: Patient fell at work in early , current right surgery for Posterior Tibial tendonitis 2021  REFERRING PHYSICIAN: Yue Ibarra MD  RETURN PHYSICIAN APPOINTMENT 2021     PROCEDURE: 1.  Right medial displacement calcaneal osteotomy                             2.  Right flexor digitorum longus tendon transfer                             3.  Right spring ligament reconstruction of the ankle                             4.  Right posterior tibial tendon debridement with advancement of tendon                             1.  CKCGF gastrocnemius recession         Pre-treatment Symptoms/Complaints: Patient reports less pain today    :currently 10 weeks out   Pain: Initial:2/10  Medications Last Reviewed:  2021     Post Session: 2/10   Updated Objective Findings: ROM DF 10        TREATMENT:   THERAPEUTIC EXERCISE: (30 minutes):  Exercises per grid below to improve mobility, strength and balance. Required minimal visual, verbal and manual cues to promote proper body alignment and promote proper body posture.   Progressed resistance and complexity of movement as indicated. Date:  6/7/2021 Date:  6/10/2021 Date:  6/17/2021 Date:  6/22/2021 Date:  6/22/2021   Activity/Exercise Parameters Parameters      Education        Nu Step 8min 8min Level 3 8min Level 3 8min Level 3 8min Level 3   Ankle Pumps  20x   30x 30x   Ankle circles  20x   30x 30x   Inversion Eversion    30x 30x   Toe Curl 20x 30x  30x    Towel Curl 2min 2min 2min 2min 2min   Wobble board 4 way 20xeach 4 way 20xeach 4 way 2x15 each 4 way 2x15 each    Heel toe   Seated 2x10 Seated 3x10 Seated 2x20 Seated 3x20   SLR 3 way 20x each Flex Abd w/boot 3x10 each SL  2x10 blue Abd     Clam  SL  2x10 blue SL  2x10 blue SL  2x10 blue     SB Hamstring curl  3x10 30x     SB LE extension  3x10 30x     Wt shift   [id number]     Step fwd back   15x     Squat     Plinth 3x10   3 way hip swing      2x5 each in boot         THERAPEUTIC ACTIVITY: ( 8 minutes): Activities per gid below to improve functional movement related mobility, strength and balance to improve neuro-muscular carryover to daily functional activities for improving patient's quality of life. Required visual, verbal and manual cues to promote proper body alignment and promote proper body posture/mechanics. Progressed resistance and complexity of movement as indicated. Date:  6/24/2021 Date:   Date:     Activity/Exercise Parameters Parameters Parameters   Lateral walk 3x20' red in boot       Monster walk 3x20' red in boot                                                               MANUAL THERAPY: (15 minutes): Joint mobilization, Soft tissue mobilization was utilized and necessary because of the patient's restricted joint motion and restricted motion of soft tissue mobility.         Date  6/24/2021    Technique Used Grade  Level # Time(s) Effect while being performed          Tissue/scar mobilization  Right ankle 10min Improved mobility          Joint mobilization II Right ankle 5min Improved mobility HEP Log Date 1.    6/24/2021   2.  6/24/2021   3. 6/24/2021   4.    5.           [employer] Portal  Treatment/Session Summary:    Response to Treatment: Patient responded well to increased strengthening for hips and core while wearing boot. Communication/Consultation:  WB progression   Equipment provided today: Not today   Recommendations/Intent for next treatment session:   Next visit will focus on Manual Therapy Core Stability Pain Science Education Quad strengthening Hip strengthening soft tissue mobilization Gait. Treatment Plan of Care Effective Dates: 6/2/2021 TO 8/31/2021 (90 days).   Frequency/Duration: 2 times a week for 90 Days             Total Treatment Billable Duration:   53 min  Rx   PT Patient Time In/Time Out  Time In: 1345  Time Out: 134 Simran Stern PT    Future Appointments   Date Time Provider Shukri Perez   6/28/2021  1:45 PM Humberto Jump, PT [facility] AND [facility]   7/1/2021  1:45 PM Humberto Jump, PT [facility]   7/2/2021 11:00 AM Welford Carrel, Berkley Moulds, MD [facility] POA   7/6/2021  1:45 PM [facility]   7/8/2021  1:45 PM Humberto Jump, PT [facility]   7/12/2021  1:45 PM Humberto Jump, [facility]   7/21/2021  3:45 PM UPS LAB VD UPSG UPS[facility]   11/4/2021 10:15 AM MD Sylvester Kolb Cota

## 2021-06-28 ENCOUNTER — HOSPITAL ENCOUNTER (OUTPATIENT)
Dept: PHYSICAL THERAPY | Age: 26
Discharge: HOME | End: 2021-06-28
Payer: COMMERCIAL

## 2021-06-28 NOTE — PROGRESS NOTES
Eduardo Venegas  : 1995  [place] [employer]  Secondary:  [address] at [facility]  [address], [address] [facility]  [phone]   [phone]      PHYSICAL THERAPY    Patient unable to attend appointment today, sick.     Jerrod Bear, PT

## 2021-07-01 ENCOUNTER — HOSPITAL ENCOUNTER (OUTPATIENT)
Dept: PHYSICAL THERAPY | Age: 26
Discharge: HOME | End: 2021-07-01
Payer: COMMERCIAL

## 2021-07-01 PROCEDURE — 97530 THERAPEUTIC ACTIVITIES: CPT

## 2021-07-01 PROCEDURE — 97110 THERAPEUTIC EXERCISES: CPT

## 2021-07-01 NOTE — PROGRESS NOTES
Chen Venegas  : 1995  [place]: [place] [employer]  Secondary:  [address] at [facility]  [address], [address] [facility]  [phone]   [phone]        OUTPATIENT PHYSICAL THERAPY: PHYSICIAN COMMUNICATION    REFERRING PHYSICIAN: Bari Hector MD  Return Physician Appointment: 2021  MEDICAL/REFERRING DIAGNOSIS:  · Posterior tibial tendinitis, right leg [M76.821]  ATTENDANCE: Demetria Black has attended 7 sessions of therapy from 2021 to 2021. ASSESSMENT:DATE: 2021    PROGRESS: Demetria Black has progressed well with ROM and tolerance to WB. She c/o right lateral heel pain with standing > 20min, pain 7/10 at worst 2/10 at rest.    ROM: DF 10deg    PF 41deg   INV  22deg    EV  10deg    Strength:    Grossly  3+ to 4/5        Please advise any specific activities or exercises you would like patient to perform or avoid. RECOMMENDATIONS: Continue therapy 2-3 times a week through certification period/until goals are met. Thank you for this referral, and please do not hesitate to contact me at the number listed above if you have any questions.     Hammad Soni PT, MSR

## 2021-07-01 NOTE — PROGRESS NOTES
Albino Whitley  : 1995  Payor: Zoraida Victor / [place] [employer] / Product Type: Workers Comp /  [address] at [place] [address] [facility] [address], [address]  [phone]   [phone]                                                          Lupe Lopez MD      OUTPATIENT PHYSICAL THERAPY: Daily Treatment Note 2021 Visit Count:  7    ICD-10: Treatment Diagnosis:  Pain in right ankle and joints of right foot (M25.571)  Posterior tibial tendinitis, right leg (K06.943)  Encounter for other specified surgical aftercare (Z48.89)  Other abnormalities of gait and mobility (R26.89)     Precautions/Allergies:   Patient has no known allergies. Fall Risk Score: 3 (? 5 = High Risk)  MD Orders: Eval and Treat  MEDICAL/REFERRING DIAGNOSIS:  Posterior tibial tendinitis, right leg [M76.821]   DATE OF ONSET: Patient fell at work in early , current right surgery for Posterior Tibial tendonitis 2021  REFERRING PHYSICIAN: Lupe Lopez MD  RETURN PHYSICIAN APPOINTMENT 2021     PROCEDURE: 1.  Right medial displacement calcaneal osteotomy                             2.  Right flexor digitorum longus tendon transfer                             3.  Right spring ligament reconstruction of the ankle                             4.  Right posterior tibial tendon debridement with advancement of tendon                             5.  SPKBM gastrocnemius recession         Pre-treatment Symptoms/Complaints: Please See Progress Report Dated 2021 for more details.   :currently 11 weeks out   Pain: Initial:2/10  Medications Last Reviewed:  2021     Post Session: 2/10   Updated Objective Findings: ROM DF 10        TREATMENT:   THERAPEUTIC EXERCISE: (30 minutes):  Exercises per grid below to improve mobility, strength and balance. Required minimal visual, verbal and manual cues to promote proper body alignment and promote proper body posture. Progressed resistance and complexity of movement as indicated. Date:  6/7/2021 Date:  6/10/2021 Date:  6/17/2021 Date:  6/22/2021 Date:  6/24/2021 Date:  7/1/2021   Activity/Exercise Parameters Parameters       Education         Nu Step 8min 8min Level 3 8min Level 3 8min Level 3 8min Level 3 8min Level 4   Ankle Pumps  20x   30x 30x 30x   Ankle circles  20x   30x 30x 30x   Inversion Eversion    30x 30x 30x   Toe Curl 20x 30x  30x     Towel Curl 2min 2min 2min 2min 2min    Wobble board 4 way 20xeach 4 way 20xeach 4 way 2x15 each 4 way 2x15 each     Heel toe   Seated 2x10 Seated 3x10 Seated 2x20 Seated 3x20 Seated 3x20   SLR 3 way 20x each Flex Abd w/boot 3x10 each SL  2x10 blue Abd      Clam  SL  2x10 blue SL  2x10 blue SL  2x10 blue      SB Hamstring curl  3x10 30x      SB LE extension  3x10 30x      Wt shift   [id number]      Step fwd back   15x      Squat     Plinth 3x10 Plinth 3x10 red Abd   3 way hip swing      2x5 each in boot 3x5 each in boot         THERAPEUTIC ACTIVITY: ( 8 minutes): Activities per gid below to improve functional movement related mobility, strength and balance to improve neuro-muscular carryover to daily functional activities for improving patient's quality of life. Required visual, verbal and manual cues to promote proper body alignment and promote proper body posture/mechanics. Progressed resistance and complexity of movement as indicated. Date:  6/24/2021 Date:  7/1/2021 Date:     Activity/Exercise Parameters Parameters Parameters   Lateral walk 3x20' red in boot 3x20' red in boot     Monster walk 3x20' red in boot 3x20' red in boot                                                             MANUAL THERAPY: (4 minutes): Joint mobilization, Soft tissue mobilization was utilized and necessary because of the patient's restricted joint motion and restricted motion of soft tissue mobility.         Date  7/1/2021    Technique Used Grade  Level # Time(s) Effect while being performed          Tissue/scar mobilization  Right ankle 4min Improved mobility          Joint mobilization II Right ankle 3min Improved mobility                                     HEP Log Date 1.    7/1/2021   2.  7/1/2021   3. 7/1/2021   4.    5.           [facility] Portal  Treatment/Session Summary:    Response to Treatment: Patient responded well to increased strengthening for hips and core while wearing boot. Communication/Consultation:  Faxed Progress Note  to MD.   Equipment provided today: Not today   Recommendations/Intent for next treatment session:   Next visit will focus on Manual Therapy Core Stability Pain Science Education Quad strengthening Hip strengthening soft tissue mobilization Gait. Treatment Plan of Care Effective Dates: 6/2/2021 TO 8/31/2021 (90 days).   Frequency/Duration: 2 times a week for 90 Days             Total Treatment Billable Duration:   42 min  Rx   PT Patient Time In/Time Out  Time In: 1345  Time Out: 96 Simran Peters PT    Future Appointments   Date Time Provider Shukri Perez   7/2/2021 11:00 AM Luis Angel Clement MD [facility]   7/6/2021  1:45 PM Mj Herrera, PT [facility] AND [facility]   7/8/2021  1:45 PM jM Herrera PT [facility]   7/12/2021  1:45 PM Mj Herrera PT [facility]   7/21/2021  3:45 PM [facility] VD [facility]   11/4/2021 10:15 AM MD Rohith Macedo Fraction

## 2021-07-06 ENCOUNTER — HOSPITAL ENCOUNTER (OUTPATIENT)
Dept: PHYSICAL THERAPY | Age: 26
Discharge: HOME | End: 2021-07-06
Payer: COMMERCIAL

## 2021-07-06 PROCEDURE — 97530 THERAPEUTIC ACTIVITIES: CPT

## 2021-07-06 PROCEDURE — 97110 THERAPEUTIC EXERCISES: CPT

## 2021-07-06 NOTE — PROGRESS NOTES
Dony Junior  : 1995  Payor: Carlos Alberto Hunter / [place] [employer] / Product Type: Workers Comp /  [facility] at [address] [place] [address] [address], [address] [place] [address]  [phone]   [phone]                                                          Mundo Vidal MD      OUTPATIENT PHYSICAL THERAPY: Daily Treatment Note 2021 Visit Count:  7    ICD-10: Treatment Diagnosis:  Pain in right ankle and joints of right foot (M25.571)  Posterior tibial tendinitis, right leg (Y67.950)  Encounter for other specified surgical aftercare (Z48.89)  Other abnormalities of gait and mobility (R26.89)     Precautions/Allergies:   Patient has no known allergies. Fall Risk Score: 3 (? 5 = High Risk)  MD Orders: Eval and Treat  MEDICAL/REFERRING DIAGNOSIS:  Posterior tibial tendinitis, right leg [M76.821]   DATE OF ONSET: Patient fell at work in early , current right surgery for Posterior Tibial tendonitis 2021  REFERRING PHYSICIAN: Mundo Vidal MD  RETURN PHYSICIAN APPOINTMENT 2021     PROCEDURE: 1.  Right medial displacement calcaneal osteotomy                             2.  Right flexor digitorum longus tendon transfer                             3.  Right spring ligament reconstruction of the ankle                             4.  Right posterior tibial tendon debridement with advancement of tendon                             2.  JJWMO gastrocnemius recession         Pre-treatment Symptoms/Complaints: Patient reports that MD progressed her to weaning out the boot into a lace up brace and sent order for continued PT   :currently 11 weeks out   Pain: Initial:5/10  Medications Last Reviewed:  2021     Post Session: 2/10   Updated Objective Findings: ROM DF 10        TREATMENT:   THERAPEUTIC EXERCISE: (30 minutes):  Exercises per grid below to improve mobility, strength and balance.   Required minimal visual, verbal and manual cues to promote proper body alignment and promote proper body posture. Progressed resistance and complexity of movement as indicated. Date:  6/10/2021 Date:  6/17/2021 Date:  6/22/2021 Date:  6/24/2021 Date:  7/1/2021 Date:  7/6/2021   Activity/Exercise Parameters        Education         Nu Step 8min Level 3 8min Level 3 8min Level 3 8min Level 3 8min Level 4 8min Level 4   Ankle Pumps    30x 30x 30x    Ankle circles    30x 30x 30x    Inversion Eversion   30x 30x 30x    Wobble board 4 way 20xeach 4 way 2x15 each 4 way 2x15 each   Standing AP 30x   Heel toe  Seated 2x10 Seated 3x10 Seated 2x20 Seated 3x20 Seated 3x20 Seated 3x20   Wt shift  [id number]    [id number] w/ lateral wedge   Step fwd back  15x    20x  w/ lateral wedge   Squat    Plinth 3x10 Plinth 3x10 red Abd Plinth 3x10 red Abd   3 way hip swing     2x5 each in boot 3x5 each in boot 3x5 each in brace         THERAPEUTIC ACTIVITY: ( 10 minutes): Activities per gid below to improve functional movement related mobility, strength and balance to improve neuro-muscular carryover to daily functional activities for improving patient's quality of life. Required visual, verbal and manual cues to promote proper body alignment and promote proper body posture/mechanics. Progressed resistance and complexity of movement as indicated. Date:  6/24/2021 Date:  7/1/2021 Date:  7/6/2021   Activity/Exercise Parameters Parameters Parameters   Lateral walk 3x20' red in boot 3x20' red in boot 3x20' red in brace   Monster walk 3x20' red in boot 3x20' red in boot 3x20' red in brace                                                           MANUAL THERAPY: (0 minutes): Joint mobilization, Soft tissue mobilization was utilized and necessary because of the patient's restricted joint motion and restricted motion of soft tissue mobility.         Date  7/6/2021    Technique Used Grade  Level # Time(s) Effect while being performed          Tissue/scar mobilization  Right ankle 0min Improved mobility          Joint mobilization II Right ankle 0min Improved mobility                                     HEP Log Date 1.    7/6/2021   2.  7/6/2021   3. 7/6/2021   4.    5.           [employer]  Treatment/Session Summary:    Response to Treatment: Patient had some difficulty with stability with activity out of the boot requiring slower movements. Communication/Consultation:  Activity progression   Equipment provided today: Not today   Recommendations/Intent for next treatment session:   Next visit will focus on Manual Therapy Core Stability Pain Science Education Quad strengthening Hip strengthening soft tissue mobilization Gait. Treatment Plan of Care Effective Dates: 6/2/2021 TO 8/31/2021 (90 days).   Frequency/Duration: 2 times a week for 90 Days             Total Treatment Billable Duration:   40 min  Rx   PT Patient Time In/Time Out  Time In: 1345  Time Out: 96 Simran Peters PT    Future Appointments   Date Time Provider Shukri Perez   7/8/2021  1:45 PM Dang Jenkins PT [facility] AND [facility]   7/12/2021  1:45 PM Dang Jenkins PT [facility]   7/21/2021  3:45 PM [facility] VD UPS[facility] UPS[facility]   8/13/2021 10:40 AM Brandt Ulrich MD [facility] POA   11/4/2021 10:15 AM MD Olga Crabtree

## 2021-07-08 ENCOUNTER — HOSPITAL ENCOUNTER (OUTPATIENT)
Dept: PHYSICAL THERAPY | Age: 26
Discharge: HOME | End: 2021-07-08
Payer: COMMERCIAL

## 2021-07-08 PROCEDURE — 97110 THERAPEUTIC EXERCISES: CPT

## 2021-07-08 PROCEDURE — 97530 THERAPEUTIC ACTIVITIES: CPT

## 2021-07-08 NOTE — PROGRESS NOTES
Tomy Giron  : 1995  Payor: Keenan Pandey / [place] [employer] / Product Type: Workers Comp /  Nakia Rao at [address] [place] [address], [address] [place] [address]  [phone]   [phone]                                                          Katharina Essex, MD      OUTPATIENT PHYSICAL THERAPY: Daily Treatment Note 2021 Visit Count:  9    ICD-10: Treatment Diagnosis:  Pain in right ankle and joints of right foot (M25.571)  Posterior tibial tendinitis, right leg (G71.950)  Encounter for other specified surgical aftercare (Z48.89)  Other abnormalities of gait and mobility (R26.89)     Precautions/Allergies:   Patient has no known allergies. Fall Risk Score: 3 (? 5 = High Risk)  MD Orders: Eval and Treat  MEDICAL/REFERRING DIAGNOSIS:  Posterior tibial tendinitis, right leg [M76.821]   DATE OF ONSET: Patient fell at work in early , current right surgery for Posterior Tibial tendonitis 2021  REFERRING PHYSICIAN: Katharina Essex, MD  [address] [place] [address] 2021     PROCEDURE: 1.  Right medial displacement calcaneal osteotomy                             2.  Right flexor digitorum longus tendon transfer                             3.  Right spring ligament reconstruction of the ankle                             4.  Right posterior tibial tendon debridement with advancement of tendon                             5.  GTBKP gastrocnemius recession         Pre-treatment Symptoms/Complaints: Patient reports improving with tolerance to walking out of the boot  :currently 12 weeks out   Pain: Initial:3/10  Medications Last Reviewed:  2021     Post Session: 2/10   Updated Objective Findings: Increased SLS right during         TREATMENT:   THERAPEUTIC EXERCISE: (32 minutes):  Exercises per grid below to improve mobility, strength and balance.   Required minimal visual, verbal and manual cues to promote proper body alignment and promote proper body posture. Progressed resistance and complexity of movement as indicated. Date:  6/17/2021 Date:  6/22/2021 Date:  6/24/2021 Date:  7/1/2021 Date:  7/6/2021 Date:  7/8/2021   Activity/Exercise         Education         Nu Step 8min Level 3 8min Level 3 8min Level 3 8min Level 4 8min Level 4 8min Level 4   Ankle Pumps   30x 30x 30x     Ankle circles   30x 30x 30x     Inversion Eversion  30x 30x 30x     Wobble board 4 way 2x15 each 4 way 2x15 each   Standing AP 30x Standing AP  And Lateral 30x   Heel toe  Seated 3x10 Seated 2x20 Seated 3x20 Seated 3x20 Seated 3x20    Wt shift [id number]    [id number] w/ lateral wedge    Step fwd back 15x    20x  w/ lateral wedge    Squat   Plinth 3x10 Plinth 3x10 red Abd Plinth 3x10 red Abd Bench 3x8 orange Abd   3 way hip swing    2x5 each in boot 3x5 each in boot 3x5 each in brace 2x10   Calf raise      3x10   SL Balance      5 x 10sec   Ankle Eversion      3x10 yellow seated         THERAPEUTIC ACTIVITY: ( 10 minutes): Activities per gid below to improve functional movement related mobility, strength and balance to improve neuro-muscular carryover to daily functional activities for improving patient's quality of life. Required visual, verbal and manual cues to promote proper body alignment and promote proper body posture/mechanics. Progressed resistance and complexity of movement as indicated.      Date:  6/24/2021 Date:  7/1/2021 Date:  7/6/2021 Date:  7/8/2021   Activity/Exercise Parameters Parameters Parameters    Lateral walk 3x20' red in boot 3x20' red in boot 3x20' red in brace 3x20' orange in brace   Monster walk 3x20' red in boot 3x20' red in boot 3x20' red in brace 3x20' orange in brace                                                                     MANUAL THERAPY: (0 minutes): Joint mobilization, Soft tissue mobilization was utilized and necessary because of the patient's restricted joint motion and restricted motion of soft tissue mobility. Date  7/8/2021    Technique Used Grade  Level # Time(s) Effect while being performed          Tissue/scar mobilization  Right ankle 0min Improved mobility          Joint mobilization II Right ankle 0min Improved mobility                                     HEP Log Date 1.    7/8/2021   2.  7/8/2021   3. 7/8/2021   4.    5.           [employer] Portal  Treatment/Session Summary:    Response to Treatment: Patient had improved tolerance to closed kinetic chain activity and increased strengthening. Communication/Consultation:  Activity progression   Equipment provided today: Not today   Recommendations/Intent for next treatment session:   Next visit will focus on Manual Therapy Core Stability Pain Science Education Quad strengthening Hip strengthening soft tissue mobilization Gait. Treatment Plan of Care Effective Dates: 6/2/2021 TO 8/31/2021 (90 days).   Frequency/Duration: 2 times a week for 90 Days             Total Treatment Billable Duration:   42 min  Rx   PT Patient Time In/Time Out  Time In: 1345  Time Out: 96 Simran Peters PT    Future Appointments   Date Time Provider Shukri Fayisti   7/12/2021  1:45 PM Mj Herrera PT [facility]   7/21/2021  3:45 PM [facility] VD UPSG UPSG   8/13/2021 10:40 AM Laureen Ulrich MD [facility] POA   11/4/2021 10:15 AM MD Rohith Macedo Fraction

## 2021-07-12 ENCOUNTER — HOSPITAL ENCOUNTER (OUTPATIENT)
Dept: PHYSICAL THERAPY | Age: 26
Discharge: HOME | End: 2021-07-12
Payer: COMMERCIAL

## 2021-07-12 PROCEDURE — 97110 THERAPEUTIC EXERCISES: CPT

## 2021-07-12 PROCEDURE — 97530 THERAPEUTIC ACTIVITIES: CPT

## 2021-07-12 NOTE — PROGRESS NOTES
Kait Ramachandran  : 1995      Payor: Christal Montoya / [place] [employer] / Product Type: Workers Comp /  Stewart Shai at [address] [place] [address], [facility], [address] [place] [address]  [phone]   [phone]        OUTPATIENT PHYSICAL THERAPY:PROGRESS NOTE: 2021    ICD-10: Treatment Diagnosis:  Pain in right ankle and joints of right foot (M25.571)  Posterior tibial tendinitis, right leg (T84.842)  Encounter for other specified surgical aftercare (Z48.89)  Other abnormalities of gait and mobility (R26.89)    Precautions/Allergies:   Patient has no known allergies. Fall Risk Score: 3 (? 5 = High Risk)  MD Orders: Eval and Treat  MEDICAL/REFERRING DIAGNOSIS:  Posterior tibial tendinitis, right leg [M76.821]   DATE OF ONSET: Patient fell at work in early , current right surgery for Posterior Tibial tendonitis 2021  REFERRING PHYSICIAN: Gisela Milton MD  [facility] 2021    PROCEDURE: 1. Right medial displacement calcaneal osteotomy                             2.  Right flexor digitorum longus tendon transfer                             3.  Right spring ligament reconstruction of the ankle                             4.  Right posterior tibial tendon debridement with advancement of tendon                             5.  Right gastrocnemius recession     INITIAL ASSESSMENT:   Ms. Sha Schilling presents to physical therapy with decreased strength, ROM, joint mobility, functional mobility, and gait. These S/S are consistent with Posterior tibial tendinitis, right leg [M76.821]. Patient will benefit from skilled physical therapy for manual therapeutic techniques (as appropriate), therapeutic exercises and activities, neuromuscular re-education, balance training and comprehensive home exercises program to address current impairments and functional limitations.     Kait Ramachandran will benefit from skilled PT (medically necessary) in order to address above deficits affecting participation in basic ADLs and overall functional tolerance. PROGRESS NOTE (7/12/2021):  Patient has been seen for 10 sessions of physical therapy from 6/2/2021 to 7/12/2021. Patient reports feeling 50% better with walking and daily activity. She remains limited with strength, stability and tolerance for weightbearing long periods required for work. Patient will benefit from continued skilled physical therapy to address remaining goals and deficits. PROBLEM LIST (Impacting functional limitations):   1. Decreased Strength  2. Decreased ADL/Functional Activities  3. Decreased Transfer Abilities  4. Decreased Ambulation Ability/Technique  5. Decreased Balance  6. Increased Pain  7. Decreased Activity Tolerance  8. Decreased [facility] with Home Exercise Program INTERVENTIONS PLANNED:  1. Balance Exercise  2. Bed Mobility  3. Cold  4. Cryotherapy  5. Family Education  6. Gait Training  7. Heat  8. Home Exercise Program (HEP)  9. Manual Therapy  10. Neuromuscular Re-education/Strengthening  11. Range of Motion (ROM)  12. Therapeutic Activites  13. Therapeutic Exercise/Strengthening  14. Transfer Training  15. Ultrasound (US)   TREATMENT PLAN:  Effective Dates: 6/2/2021 TO 8/31/2021 (90 days). Frequency/Duration: 2 times a week for 90 Day   Short-Term Goals~4 weeks  Goal Met   1. Dony Pacheco will be compliant with home exercise program within 4 weeks in order to improve active participation with management of patient's symptoms and/or functional deficits. 1.  [x] Date: 7/12/2021   2. Dony Pacheco will report <=3/10 pain with walking >15 minutes in order to participate in daily exercise and daily activities 2. [x] Date: 7/12/2021   3. Dony Pacheco will be able to stand >60 minutes with <2/10 pain to feet in order to participate in household duties without issues/compromise. 3.  [] Date:   4.  Dony Pacheco will report being able to sleep at night without waking up secondary to foot pain. 4.  [] Date:   5. 5. Neetu Balloon  will improve ankle dorsi flexion active ROM from 6 to 15 for normal gait  5. [x] Date: 7/12/2021   6. Neetu Balloon  will improve ankle strength to 4+ to 5/5 to improve tolerance of ADLs. 6.  [] Date:              Long Term Goals~8 weeks Goal Met   1. Neetu Balloon will be independent with home exercise program within 8 weeks in order to improve independence with management of patient's symptoms and/or functional deficits. 1.  [x] Date: 7/12/2021   2. Neetu Balloon will report no pain with walking affecting participation in activities of daily living. 2.  [] Date:   3. Neetu Balloon  will be able to stand >60 minutes without onset of foot pain. 3.  [] Date:   4. Neetu Balloon will improve PT/OT FOOT AND ANKLE ABILITY MEASURE to 50/116 to demonstrate increased tolerance to activity. 4.  [x] Date: 7/12/2021   5. Pt will report standing for > 60 minutes with minimal to no increase in pain in order to be able to stand for prolonged periods as needed to perform duties at work. 5.  [] Date:   6. Neetu Balloon will improve PT/OT FOOT AND ANKLE ABILITY MEASURE to 80/116 to demonstrate increased tolerance to activity. 6.  [] Date:     Outcome Measure: Tool Used: PT/OT FOOT AND ANKLE ABILITY MEASURE  Score:  Initial: 5/116 Most Recent: 54 (Date: -- )   Interpretation of Score: For the \"Activities of Daily Living\", there are 21 questions each scored on a 5 point scale with 0 representing \"Unable to do\" and 4 representing \"No difficulty\". The lower the score, the greater the functional disability. 84/84 represents no disability. Minimal detectable change is 5.7 points.   With the addition of the 8 questions in the \"Sports Subscale,\" there are 29 questions, each scored on a 5 point scale with 0 representing \"Unable to do\" and 4 representing \"No difficulty\". The lower the score, the greater the functional disability. 116/116 represents no disability. Minimal detectable change is 12.3 points.     Observation/Orthostatic Postural Assessment:         Gait:  Slight decrease SLS right LE    Standing: Mild decreased WB right     Palpation:  Mild Tenderness to medial and lateral right foot    Girth Measurement:   Joint: Eval Date: 6/2/2021  Re-Assess Date: 7/12/2021 Re-Assess Date:   Ankle Figure 8 Measurement RIGHT LEFT RIGHT RIGHT    49 cm 48 cm 48 cm          AROM/PROM       Joint: Eval Date: 6/2/2021  Re-Assess Date: 7/12/2021 Re-Assess Date:   Active ROM RIGHT LEFT RIGHT RIGHT   Ankle Dorsiflexion 6 15 10     Ankle Plantarflexion 45 55 46     Ankle Inversion 23 35 27     Ankle Eversion 10 22 15                     Passive ROM        Ankle Dorsiflexion NT WNL 13     Ankle Plantarflexion NT WNL 48     Ankle Inversion NT WNL 29     Ankle Eversion NT WNL 17       Strength:     Eval Date:6/2/2021  Re-Assess Date: 7/12/2021 Re-Assess Date:     RIGHT LEFT RIGHT RIGHT   Ankle Dorsiflexion 4-/5 4+/5 4/5     Ankle Plantarflexion 3+/5 4+/5 4-/5     Ankle Inversion 3/5 4+/5 4-/5     Ankle Eversion 3+/5 4+/5 4-/5     Knee Flexion 4/5 5/5 4+/5     Knee Extension 4/5 5/5 4+/5               Joint Mobility Eval Date: 6/2/2021  Re-Assess Date: 7/12/2021 Re-Assess Date:    RIGHT LEFT RIGHT RIGHT   Subtalar Hypomobile Normal Hypomobile     Talocrual Hypomobile Normal  Hypomobile                       Special Tests:   Not tested post surgical    Neurological Screen:  No radiating symptoms down leg    Functional Mobility:  Limited tolerance of walking and standing  Heel/Toe walking= Unable  Calf Raise (Dbl/Single)=  SL =Unable    DL=20  Balance and Mobility:    Test Result   Timed up and Go 12 Seconds   30 second Sit to Stand 14   Single Leg Balance Right: <15 seconds     Left:<45 seconds             Medical Necessity:   ·    Skilled intervention continues to be required due to above deficits affecting participation in basic ADLs and overall functional tolerance. · Patient demonstrates capacity to improve ROM and strength which will increase independence, increase safety, and allow for return to previous activity level. · Patient demonstrates excellent rehab potential due to higher previous functional level. Reason for Services/Other Comments:  · Patient requires skilled intervention due to modification of therapeutic interventions to increase complexity of exercises . Rehabilitation Potential For Stated Goals: Excellent  Regarding Adolm Olszewski Tribble's therapy, I certify that the treatment plan above will be carried out by a therapist or under their direction. Thank you for this referral,  Kirti Henderson PT     Referring Physician Signature: Carmen Biggs MD No Signature is Required for this note.

## 2021-07-12 NOTE — PROGRESS NOTES
Kirtrudi Paige  : 1995  Payor: Pam Correia / [place] [employer] / Product Type: Workers Comp /  [facility] at [address] [place] [address], [facility], [address] [place] [address]  [phone]   [phone]                                                          Pili Mobley MD      OUTPATIENT PHYSICAL THERAPY: Daily Treatment Note 2021 Visit Count:  9    ICD-10: Treatment Diagnosis:  Pain in right ankle and joints of right foot (M25.571)  Posterior tibial tendinitis, right leg (D35.857)  Encounter for other specified surgical aftercare (Z48.89)  Other abnormalities of gait and mobility (R26.89)     Precautions/Allergies:   Patient has no known allergies. Fall Risk Score: 3 (? 5 = High Risk)  MD Orders: Eval and Treat  MEDICAL/REFERRING DIAGNOSIS:  Posterior tibial tendinitis, right leg [M76.821]   DATE OF ONSET: Patient fell at work in early , current right surgery for Posterior Tibial tendonitis 2021  REFERRING PHYSICIAN: Pili Mobley MD  RETURN PHYSICIAN APPOINTMENT 2021     PROCEDURE: 1.  Right medial displacement calcaneal osteotomy                             2.  Right flexor digitorum longus tendon transfer                             3.  Right spring ligament reconstruction of the ankle                             4.  Right posterior tibial tendon debridement with advancement of tendon                             0.  VHRFX gastrocnemius recession         Pre-treatment Symptoms/Complaints: Patient reports slowly improving with WB  :currently 12 weeks out   Pain: Initial:3/10  Medications Last Reviewed:  2021     Post Session: 2/10   Updated Objective Findings: Increased SLS right during gait        TREATMENT:   THERAPEUTIC EXERCISE: (30 minutes):  Exercises per grid below to improve mobility, strength and balance. Required minimal visual, verbal and manual cues to promote proper body alignment and promote proper body posture. Progressed resistance and complexity of movement as indicated. Date:  6/22/2021 Date:  6/24/2021 Date:  7/1/2021 Date:  7/6/2021 Date:  7/8/2021 Date:  7/12/2021   Activity/Exercise         Education         Nu Step 8min Level 3 8min Level 3 8min Level 4 8min Level 4 8min Level 4 8min Level 5   Ankle Pumps  30x 30x 30x      Ankle circles  30x 30x 30x      Inversion Eversion 30x 30x 30x      Wobble board 4 way 2x15 each   Standing AP 30x Standing AP  And Lateral 30x    Heel toe  Seated 2x20 Seated 3x20 Seated 3x20 Seated 3x20     Wt shift    [id number] w/ lateral wedge     Step fwd back    20x  w/ lateral wedge     Squat  Plinth 3x10 Plinth 3x10 red Abd Plinth 3x10 red Abd Bench 3x8 orange Abd Bench 3x5 touch orange Abd   3 way hip swing   2x5 each in boot 3x5 each in boot 3x5 each in brace 2x10 2x10 pad lateral wedge   Calf raise     3x10 3x10   SL Balance     5 x 10sec 5 x 15sec   Ankle Eversion     3x10 yellow seated 3x10 yellow seated         THERAPEUTIC ACTIVITY: ( 12 minutes): Activities per gid below to improve functional movement related mobility, strength and balance to improve neuro-muscular carryover to daily functional activities for improving patient's quality of life. Required visual, verbal and manual cues to promote proper body alignment and promote proper body posture/mechanics. Progressed resistance and complexity of movement as indicated.      Date:  6/24/2021 Date:  7/1/2021 Date:  7/6/2021 Date:  7/8/2021 Date:  7/12/2021   Activity/Exercise Parameters Parameters Parameters     Lateral walk 3x20' red in boot 3x20' red in boot 3x20' red in brace 3x20' orange in brace 3x20' orange in brace   Monster walk 3x20' red in boot 3x20' red in boot 3x20' red in brace 3x20' orange in brace 3x20' orange in brace   [facility] carry         3 laps 2 10#                                                                 MANUAL THERAPY: (0 minutes): Joint mobilization, Soft tissue mobilization was utilized and necessary because of the patient's restricted joint motion and restricted motion of soft tissue mobility. Date  7/12/2021    Technique Used Grade  Level # Time(s) Effect while being performed          Tissue/scar mobilization  Right ankle 0min Improved mobility          Joint mobilization II Right ankle 0min Improved mobility                                     HEP Log Date 1.    7/12/2021   2.  7/12/2021   3. 7/12/2021   4.    5.           [facility] Portal  Treatment/Session Summary:    Response to Treatment: Patient improving with SLS, requires breaks between activity. Communication/Consultation:  Activity progression   Equipment provided today: Not today   Recommendations/Intent for next treatment session:   Next visit will focus on Manual Therapy Core Stability Pain Science Education Quad strengthening Hip strengthening soft tissue mobilization Gait. Treatment Plan of Care Effective Dates: 6/2/2021 TO 8/31/2021 (90 days).   Frequency/Duration: 2 times a week for 90 Days             Total Treatment Billable Duration:   42 min  Rx   PT Patient Time In/Time Out  Time In: 1345  Time Out: 96 Simran Peters PT    Future Appointments   Date Time Provider Shukri Perez   7/20/2021  1:45 PM Rosanne Moreno PT [facility]   7/21/2021  3:45 PM [facility]D UPS[facility]   7/22/2021  1:45 PM Alta Bundy PT [facility]   7/27/2021  2:30 PM Rosanne Moreno PT [facility]   8/13/2021 10:40 AM Jarrett Colvin MD [facility] POA   11/4/2021 10:15 AM MD Mckayla Liu

## 2021-07-20 ENCOUNTER — HOSPITAL ENCOUNTER (OUTPATIENT)
Dept: PHYSICAL THERAPY | Age: 26
Discharge: HOME | End: 2021-07-20
Payer: COMMERCIAL

## 2021-07-20 PROCEDURE — 97140 MANUAL THERAPY 1/> REGIONS: CPT

## 2021-07-20 PROCEDURE — 97110 THERAPEUTIC EXERCISES: CPT

## 2021-07-20 NOTE — PROGRESS NOTES
Grace Shirley  : 1995  Payor: Kelvin Jarvis / [place] [employer] / Product Type: Workers Comp /  [address] at [address], [address] [place] [address] Road  [phone]   [phone]                                                          David Boss MD      OUTPATIENT PHYSICAL THERAPY: Daily Treatment Note 2021 Visit Count:  11    ICD-10: Treatment Diagnosis:  Pain in right ankle and joints of right foot (M25.571)  Posterior tibial tendinitis, right leg (D23.863)  Encounter for other specified surgical aftercare (Z48.89)  Other abnormalities of gait and mobility (R26.89)     Precautions/Allergies:   Patient has no known allergies. Fall Risk Score: 3 (? 5 = High Risk)  MD Orders: Eval and Treat  MEDICAL/REFERRING DIAGNOSIS:  Posterior tibial tendinitis, right leg [M76.821]   DATE OF ONSET: Patient fell at work in early , current right surgery for Posterior Tibial tendonitis 2021  REFERRING PHYSICIAN: David Boss MD  RETURN PHYSICIAN APPOINTMENT 2021     PROCEDURE: 1.  Right medial displacement calcaneal osteotomy                             2.  Right flexor digitorum longus tendon transfer                             3.  Right spring ligament reconstruction of the ankle                             4.  Right posterior tibial tendon debridement with advancement of tendon                             5.  INCSK gastrocnemius recession         Pre-treatment Symptoms/Complaints: Patient reports no pain today only on and off tingling   :currently 14 weeks out   Pain: Initial:0/10  Medications Last Reviewed:  2021     Post Session: 0/10   Updated Objective Findings: Mild TTP lateral right ankle        TREATMENT:   THERAPEUTIC EXERCISE: (32 minutes):  Exercises per grid below to improve mobility, strength and balance.   Required minimal visual, verbal and manual cues to promote proper body alignment and promote proper body posture. Progressed resistance and complexity of movement as indicated. Date:  6/24/2021 Date:  7/1/2021 Date:  7/6/2021 Date:  7/8/2021 Date:  7/12/2021 Date:  7/20/2021   Activity/Exercise         Education         Nu Step 8min Level 3 8min Level 4 8min Level 4 8min Level 4 8min Level 5 8min Level 5   Gastroc Sol stretch      [id number] each wedge   Ankle Pumps  30x 30x       Ankle circles  30x 30x       Inversion Eversion 30x 30x       Wobble board   Standing AP 30x Standing AP  And Lateral 30x     Heel toe  Seated 3x20 Seated 3x20 Seated 3x20      Wt shift   [id number] w/ lateral wedge      Step fwd back   20x  w/ lateral wedge      Squat Plinth 3x10 Plinth 3x10 red Abd Plinth 3x10 red Abd Bench 3x8 orange Abd Bench 3x5 touch orange Abd    3 way hip swing  2x5 each in boot 3x5 each in boot 3x5 each in brace 2x10 2x10 pad lateral wedge 2x10 pad   Calf raise    3x10 3x10 3x10 pad   SL Balance    5 x 10sec 5 x 15sec    Ankle Eversion    3x10 yellow seated 3x10 yellow seated    Shuttle press      3x10 25# right   Shuttle Calf raise      3x10 12.5 right         THERAPEUTIC ACTIVITY: ( 0 minutes): Activities per gid below to improve functional movement related mobility, strength and balance to improve neuro-muscular carryover to daily functional activities for improving patient's quality of life. Required visual, verbal and manual cues to promote proper body alignment and promote proper body posture/mechanics. Progressed resistance and complexity of movement as indicated.      Date:  6/24/2021 Date:  7/1/2021 Date:  7/6/2021 Date:  7/8/2021 Date:  7/12/2021   Activity/Exercise Parameters Parameters Parameters     Lateral walk 3x20' red in boot 3x20' red in boot 3x20' red in brace 3x20' orange in brace 3x20' orange in brace   Monster walk 3x20' red in boot 3x20' red in boot 3x20' red in brace 3x20' orange in brace 3x20' orange in brace   [facility] carry         3 laps 2 10#                                                                MANUAL THERAPY: (10 minutes): Joint mobilization, Soft tissue mobilization was utilized and necessary because of the patient's restricted joint motion and restricted motion of soft tissue mobility. Date  7/20/2021    Technique Used Grade  Level # Time(s) Effect while being performed          Tissue/scar mobilization  Right ankle 7min Improved mobility          Joint mobilization II Right ankle 3min Improved mobility                                     HEP Log Date 1.    7/20/2021   2.  7/20/2021   3. 7/20/2021   4.    5.           [facility]  Treatment/Session Summary:    Response to Treatment: Patient was challenged with increased SL strengthening but without pain. Communication/Consultation:  Activity progression   Equipment provided today: Not today   Recommendations/Intent for next treatment session:   Next visit will focus on Manual Therapy Core Stability Pain Science Education Quad strengthening Hip strengthening soft tissue mobilization Gait. Treatment Plan of Care Effective Dates: 6/2/2021 TO 8/31/2021 (90 days).   Frequency/Duration: 2 times a week for 90 Days             Total Treatment Billable Duration:   42 min  Rx   PT Patient Time In/Time Out  Time In: 1345  Time Out: 96 Simran Peters PT    Future Appointments   Date Time Provider Shukri Perez   7/21/2021  3:45 PM [facility]D [facility]   7/22/2021  1:45 PM Aline Bishop PT [facility]   7/27/2021  2:30 PM Chantell Sol PT [facility]   8/13/2021 10:40 AM Hershell Hoof, Hyland Sicard, MD [facility] POA   11/4/2021 10:15 AM MD Wilda Biggs

## 2021-07-22 ENCOUNTER — HOSPITAL ENCOUNTER (OUTPATIENT)
Dept: PHYSICAL THERAPY | Age: 26
Discharge: HOME | End: 2021-07-22
Payer: COMMERCIAL

## 2021-07-22 PROCEDURE — 97530 THERAPEUTIC ACTIVITIES: CPT

## 2021-07-22 PROCEDURE — 97110 THERAPEUTIC EXERCISES: CPT

## 2021-07-22 NOTE — PROGRESS NOTES
Ramon Chang  : 1995  Payor: Sen Lee / [place] [employer] [place] [employer] / Product Type: Workers Comp /  [address] at [address], [id number] [place] [address] Road  [phone]   [phone]                                                          Jorge Mike MD      OUTPATIENT PHYSICAL THERAPY: Daily Treatment Note 2021 Visit Count:  11    ICD-10: Treatment Diagnosis:  Pain in right ankle and joints of right foot (M25.571)  Posterior tibial tendinitis, right leg (C51.951)  Encounter for other specified surgical aftercare (Z48.89)  Other abnormalities of gait and mobility (R26.89)     Precautions/Allergies:   Patient has no known allergies. Fall Risk Score: 3 (? 5 = High Risk)  MD Orders: Eval and Treat  MEDICAL/REFERRING DIAGNOSIS:  Posterior tibial tendinitis, right leg [M76.821]   DATE OF ONSET: Patient fell at work in early , current right surgery for Posterior Tibial tendonitis 2021  REFERRING PHYSICIAN: Jorge Mike MD  [address] [place] [address] 2021     PROCEDURE: 1.  Right medial displacement calcaneal osteotomy                             2.  Right flexor digitorum longus tendon transfer                             3.  Right spring ligament reconstruction of the ankle                             4.  Right posterior tibial tendon debridement with advancement of tendon                             7.  ZJMIG gastrocnemius recession         Pre-treatment Symptoms/Complaints: Patient reports mild tingling in foot with spontanuoeus pain going into the heel and middle of foot. Pain goes away and is fine with seconds. Pain: Initial:0/10  Medications Last Reviewed:  2021     Post Session: 0/10   Updated Objective Findings: Mild TTP lateral right ankle        TREATMENT:   THERAPEUTIC EXERCISE: (32 minutes):  Exercises per grid below to improve mobility, strength and balance.   Required minimal visual, verbal and manual cues to promote proper body alignment and promote proper body posture. Progressed resistance and complexity of movement as indicated. Date:  7/6/2021 Date:  7/8/2021 Date:  7/12/2021 Date:  7/20/2021 Date  7/22/2021   Activity/Exercise        Education        Nu Step 8min Level 4 8min Level 4 8min Level 5 8min Level 5 8 min   Gastroc Sol stretch    5p88ebr each wedge 5m40kid each wedge   Ankle Pumps         Ankle circles         Inversion Eversion        Wobble board Standing AP 30x Standing AP  And Lateral 30x      Heel toe  Seated 3x20       Wt shift 28u3ret w/ lateral wedge       Step fwd back 20x  w/ lateral wedge       Squat Plinth 3x10 red Abd Bench 3x8 orange Abd Bench 3x5 touch orange Abd  Bench 3x5 touch orange Abd   3 way hip swing  3x5 each in brace 2x10 2x10 pad lateral wedge 2x10 pad 2x10 pad   Calf raise  3x10 3x10 3x10 pad 3x10 pad   SL Balance  5 x 10sec 5 x 15sec     Ankle Eversion  3x10 yellow seated 3x10 yellow seated     Shuttle press    3x10 25# right 3x10 25lbs    Shuttle Calf raise    3x10 12.5 right 3x10 12.5 right         THERAPEUTIC ACTIVITY: ( 10 minutes): Activities per gid below to improve functional movement related mobility, strength and balance to improve neuro-muscular carryover to daily functional activities for improving patient's quality of life. Required visual, verbal and manual cues to promote proper body alignment and promote proper body posture/mechanics. Progressed resistance and complexity of movement as indicated.      Date:  7/1/2021 Date:  7/6/2021 Date:  7/8/2021 Date:  7/12/2021 Date  7/22/2021   Activity/Exercise Parameters Parameters      Lateral walk 3x20' red in boot 3x20' red in brace 3x20' orange in brace 3x20' orange in brace 3x20' orange in brace   Monster walk 3x20' red in boot 3x20' red in brace 3x20' orange in brace 3x20' orange in brace 3x20' orange in brace   Henery Plush carry       3 laps 2 10# 3 laps 2 10#                                                                MANUAL THERAPY: (0 minutes): Joint mobilization, Soft tissue mobilization was utilized and necessary because of the patient's restricted joint motion and restricted motion of soft tissue mobility. Date  7/22/2021    Technique Used Grade  Level # Time(s) Effect while being performed                                                                 HEP Log Date 1.    7/22/2021   2.  7/22/2021   3. 7/22/2021   4.    5.           [employer] Portal  Treatment/Session Summary:    Response to Treatment: Patient Improved tolerance to exercises but mild faitgue     Communication/Consultation:  Activity progression   Equipment provided today: Not today   Recommendations/Intent for next treatment session:   Next visit will focus on Manual Therapy Core Stability Pain Science Education Quad strengthening Hip strengthening soft tissue mobilization Gait. Treatment Plan of Care Effective Dates: 6/2/2021 TO 8/31/2021 (90 days).   Frequency/Duration: 2 times a week for 90 Days             Total Treatment Billable Duration:   42 min  Rx    Time in/out   Time in: 1345   Time out 100 [facility], PT    Future Appointments   Date Time Provider Shukri Perez   7/22/2021  1:45 PM Javi Garcia PT [facility]   7/22/2021  4:15 PM [facility] VD UPSG UPSG   7/27/2021  2:30 PM Gladys Campbell PT [facility]   8/13/2021 10:40 AM Fang August MD PO[facility] POA   11/4/2021 10:15 AM [facility]

## 2021-07-28 ENCOUNTER — HOSPITAL ENCOUNTER (OUTPATIENT)
Dept: PHYSICAL THERAPY | Age: 26
Discharge: HOME | End: 2021-07-28
Payer: COMMERCIAL

## 2021-07-28 PROCEDURE — 97530 THERAPEUTIC ACTIVITIES: CPT

## 2021-07-28 PROCEDURE — 97110 THERAPEUTIC EXERCISES: CPT

## 2021-07-28 PROCEDURE — 97140 MANUAL THERAPY 1/> REGIONS: CPT

## 2021-07-28 NOTE — PROGRESS NOTES
Edna Wolf  : 1995  Payor: Juanis Ip / [place] [employer] [place] [employer] / Product Type: Workers Comp /  Hieuza Stacks at [address] [place] [address], [facility], [address] [place] [address]  [phone]   [phone]                                                          Jeremy Ibarra MD      OUTPATIENT PHYSICAL THERAPY: Daily Treatment Note 2021 Visit Count:  13,  new order    ICD-10: Treatment Diagnosis:  Pain in right ankle and joints of right foot (M25.571)  Posterior tibial tendinitis, right leg (O87.336)  Encounter for other specified surgical aftercare (Z48.89)  Other abnormalities of gait and mobility (R26.89)     Precautions/Allergies:   Patient has no known allergies. Fall Risk Score: 3 (? 5 = High Risk)  MD Orders: Eval and Treat  MEDICAL/REFERRING DIAGNOSIS:  Posterior tibial tendinitis, right leg [M76.821]   DATE OF ONSET: Patient fell at work in early , current right surgery for Posterior Tibial tendonitis 2021  REFERRING PHYSICIAN: Jeremy Ibarra MD  RETURN PHYSICIAN APPOINTMENT 2021     PROCEDURE: 1.  Right medial displacement calcaneal osteotomy                             2.  Right flexor digitorum longus tendon transfer                             3.  Right spring ligament reconstruction of the ankle                             4.  Right posterior tibial tendon debridement with advancement of tendon                             7.  BMYZR gastrocnemius recession         Pre-treatment Symptoms/Complaints: Patient reports continued on off tingling, overall less pain and walking better   Pain: Initial:1/10  Medications Last Reviewed:  2021     Post Session: 010   Updated Objective Findings: Mild TTP lateral right ankle        TREATMENT:   THERAPEUTIC EXERCISE: (32 minutes):  Exercises per grid below to improve mobility, strength and balance.   Required minimal visual, verbal and manual cues to promote proper body alignment and promote proper body posture. Progressed resistance and complexity of movement as indicated. Date:  7/6/2021 Date:  7/8/2021 Date:  7/12/2021 Date:  7/20/2021 Date  7/22/2021 Date:  7/20/2021   Activity/Exercise         Education         Nu Step 8min Level 4 8min Level 4 8min Level 5 8min Level 5 8 min 8min Level 5   Gastroc Sol stretch    5p67zpg each wedge 9m89tyz each wedge 2s75aqh each wedge   Wobble board Standing AP 30x Standing AP  And Lateral 30x       Heel toe  Seated 3x20        Wt shift 28i8qdq w/ lateral wedge        Step fwd back 20x  w/ lateral wedge        Squat Plinth 3x10 red Abd Bench 3x8 orange Abd Bench 3x5 touch orange Abd  Bench 3x5 touch orange Abd Bench 3x5 15# touch purple Abd   3 way hip swing  3x5 each in brace 2x10 2x10 pad lateral wedge 2x10 pad 2x10 pad 2x10 pad   Calf raise  3x10 3x10 3x10 pad 3x10 pad 3x12 pad   SL Balance  5 x 10sec 5 x 15sec      Ankle Eversion  3x10 yellow seated 3x10 yellow seated      Shuttle press    3x10 25# right 3x10 25lbs  3x10 50# right   Shuttle Calf raise    3x10 12.5 right 3x10 12.5 right 3x10 25# right         THERAPEUTIC ACTIVITY: ( 13 minutes): Activities per gid below to improve functional movement related mobility, strength and balance to improve neuro-muscular carryover to daily functional activities for improving patient's quality of life. Required visual, verbal and manual cues to promote proper body alignment and promote proper body posture/mechanics. Progressed resistance and complexity of movement as indicated.      Date:  7/1/2021 Date:  7/6/2021 Date:  7/8/2021 Date:  7/12/2021 Date  7/22/2021 Date  7/28/2021   Activity/Exercise Parameters Parameters       Lateral walk 3x20' red in boot 3x20' red in brace 3x20' orange in brace 3x20' orange in brace 3x20' orange in brace 4x20' purple in brace   Monster walk 3x20' red in boot 3x20' red in brace 3x20' orange in brace 3x20' orange in brace 3x20' orange in brace 4x20' purple in brace   Brianda Wayne carry       3 laps 2 10# 3 laps 2 10# 4 laps 2 10#                                                                         MANUAL THERAPY: (8 minutes): Joint mobilization, Soft tissue mobilization was utilized and necessary because of the patient's restricted joint motion and restricted motion of soft tissue mobility. Date  7/28/2021    Technique Used Grade  Level # Time(s) Effect while being performed                                      Soft tissue mobilization  Right ankle 8min                        HEP Log Date 1.    7/28/2021   2.  7/28/2021   3. 7/28/2021   4.    5.           [employer] Portal  Treatment/Session Summary:    Response to Treatment: Patient good tolerance to activity, decreased supination right foot during gait     Communication/Consultation:  Activity progression   Equipment provided today: Not today   Recommendations/Intent for next treatment session:   Next visit will focus on Manual Therapy Core Stability Pain Science Education Quad strengthening Hip strengthening soft tissue mobilization Gait. Treatment Plan of Care Effective Dates: 6/2/2021 TO 8/31/2021 (90 days).   Frequency/Duration: 2 times a week for 90 Days             Total Treatment Billable Duration:   53 min  Rx   PT Patient Time In/Time Out  Time In: 0920  Time Out: 4[address] [facility],     Future Appointments   Date Time Provider Shukri Perez   8/13/2021 10:40 AM Russell Scales MD [facility]   11/4/2021 10:15 AM MD Merritt Wongla Linette

## 2021-07-30 ENCOUNTER — HOSPITAL ENCOUNTER (OUTPATIENT)
Dept: PHYSICAL THERAPY | Age: 26
Discharge: HOME | End: 2021-07-30
Payer: COMMERCIAL

## 2021-07-30 PROCEDURE — 97530 THERAPEUTIC ACTIVITIES: CPT

## 2021-07-30 PROCEDURE — 97110 THERAPEUTIC EXERCISES: CPT

## 2021-07-30 NOTE — PROGRESS NOTES
Neetu Balloon  : 1995  Payor: Marietta Burt / [place] [employer] [place] [employer] / Product Type: Workers Comp /  [address] at [address] [place] [address], [facility], [address] [place] [address] Road  [phone]   [phone]                                                          Vimal Neely MD      OUTPATIENT PHYSICAL THERAPY: Daily Treatment Note 2021 Visit Count:  14,  new order    ICD-10: Treatment Diagnosis:  Pain in right ankle and joints of right foot (M25.571)  Posterior tibial tendinitis, right leg (F03.546)  Encounter for other specified surgical aftercare (Z48.89)  Other abnormalities of gait and mobility (R26.89)     Precautions/Allergies:   Patient has no known allergies. Fall Risk Score: 3 (? 5 = High Risk)  MD Orders: Eval and Treat  MEDICAL/REFERRING DIAGNOSIS:  Posterior tibial tendinitis, right leg [M76.821]   DATE OF ONSET: Patient fell at work in early , current right surgery for Posterior Tibial tendonitis 2021  REFERRING PHYSICIAN: Vimal Neely MD  [address] [place] [address] 2021     PROCEDURE: 1.  Right medial displacement calcaneal osteotomy                             2.  Right flexor digitorum longus tendon transfer                             3.  Right spring ligament reconstruction of the ankle                             4.  Right posterior tibial tendon debridement with advancement of tendon                             7.  ZTOZU gastrocnemius recession         Pre-treatment Symptoms/Complaints: Patient reports soreness but improving   Pain: Initial:1/10  Medications Last Reviewed:  2021     Post Session: 0/10   Updated Objective Findings: Improved gait        TREATMENT:   THERAPEUTIC EXERCISE: (32 minutes):  Exercises per grid below to improve mobility, strength and balance. Required minimal visual, verbal and manual cues to promote proper body alignment and promote proper body posture.   Progressed resistance and complexity of movement as indicated. Date:  7/8/2021 Date:  7/12/2021 Date:  7/20/2021 Date  7/22/2021 Date:  7/28/2021 Date:  7/30/2021   Activity/Exercise         Education         Nu Step 8min Level 4 8min Level 5 8min Level 5 8 min 8min Level 5 8min Level 5   Gastroc Sol stretch   3s80yig each wedge 2j81ogy each wedge 8h98wbm each wedge 1m81uop each   Wobble board Standing AP  And Lateral 30x        Heel toe          Wt shift         Step fwd back         Squat Bench 3x8 orange Abd Bench 3x5 touch orange Abd  Bench 3x5 touch orange Abd Bench 3x5 15# touch purple Abd Bench 3x8 20# touch   3 way hip swing  2x10 2x10 pad lateral wedge 2x10 pad 2x10 pad 2x10 pad    Calf raise 3x10 3x10 3x10 pad 3x10 pad 3x12 pad 3x12 pad   Eccentric calf         SL Balance 5 x 10sec 5 x 15sec    0r62otr pad   Ankle Eversion 3x10 yellow seated 3x10 yellow seated       Shuttle press   3x10 25# right 3x10 25lbs  3x10 50# right    Shuttle Calf raise   3x10 12.5 right 3x10 12.5 right 3x10 25# right    SAÚL      3 way 2 x 30x each yellow                           THERAPEUTIC ACTIVITY: ( 10 minutes): Activities per gid below to improve functional movement related mobility, strength and balance to improve neuro-muscular carryover to daily functional activities for improving patient's quality of life. Required visual, verbal and manual cues to promote proper body alignment and promote proper body posture/mechanics. Progressed resistance and complexity of movement as indicated.      Date:  7/6/2021 Date:  7/8/2021 Date:  7/12/2021 Date  7/22/2021 Date  7/28/2021 Date  7/30/2021   Activity/Exercise Parameters        Lateral walk 3x20' red in brace 3x20' orange in brace 3x20' orange in brace 3x20' orange in brace 4x20' purple in brace    Monster walk 3x20' red in brace 3x20' orange in brace 3x20' orange in brace 3x20' orange in brace 4x20' purple in brace    Lo carry     3 laps 2 10# 3 laps 2 10# 4 laps 2 10# 4 laps 2 15#   Cable pull walk           20x 30#                                                         MANUAL THERAPY: (0 minutes): Joint mobilization, Soft tissue mobilization was utilized and necessary because of the patient's restricted joint motion and restricted motion of soft tissue mobility. Date  7/30/2021    Technique Used Grade  Level # Time(s) Effect while being performed                                      Soft tissue mobilization  Right ankle 0min                        HEP Log Date 1.    7/30/2021   2.  7/30/2021   3. 7/30/2021   4.    5.           [employer] Portal  Treatment/Session Summary:    Response to Treatment: Patient was progressed with closed kinetic chain strengthening and balance with good tolerance     Communication/Consultation:  Activity progression   Equipment provided today: Not today   Recommendations/Intent for next treatment session:   Next visit will focus on Manual Therapy Core Stability Pain Science Education Quad strengthening Hip strengthening soft tissue mobilization Gait. Treatment Plan of Care Effective Dates: 6/2/2021 TO 8/31/2021 (90 days).   Frequency/Duration: 2 times a week for 90 Days             Total Treatment Billable Duration:   42 min  Rx   PT Patient Time In/Time Out  Time In: 0915  Time Out: [address] Francis PT    Future Appointments   Date Time Provider Shukri Perez   8/2/2021  2:30 PM Yosi Charles, PT [facility]   8/4/2021  1:45 PM Yosi Charles, PT [facility]   8/9/2021  1:45 PM Yosi Charles, PT [facility]   8/11/2021  1:45 PM Yosi Charles, PT [facility]   8/13/2021 10:40 AM Eve Sandoval MD [facility] [place]   8/16/2021  1:45 PM [facility]   8/18/2021  1:45 PM [facility]osi Charles[facility]   11/4/2021 10:15 AM MD [facility]

## 2021-08-02 ENCOUNTER — HOSPITAL ENCOUNTER (OUTPATIENT)
Dept: PHYSICAL THERAPY | Age: 26
Discharge: HOME | End: 2021-08-02
Payer: COMMERCIAL

## 2021-08-02 PROCEDURE — 97530 THERAPEUTIC ACTIVITIES: CPT

## 2021-08-02 PROCEDURE — 97110 THERAPEUTIC EXERCISES: CPT

## 2021-08-02 NOTE — PROGRESS NOTES
Heriberto Shin  : 1995  Payor: Gurpreet Staton / [place] [employer] [place] [employer] / Product Type: Workers Comp /  Eve Hoof at [address] [place] [address], [address] [place] [address]  [phone]   [phone]                                                          Syed Martin MD      OUTPATIENT PHYSICAL THERAPY: Daily Treatment Note 2021 Visit Count:  15,  new order    ICD-10: Treatment Diagnosis:  Pain in right ankle and joints of right foot (M25.571)  Posterior tibial tendinitis, right leg (K33.398)  Encounter for other specified surgical aftercare (Z48.89)  Other abnormalities of gait and mobility (R26.89)     Precautions/Allergies:   Patient has no known allergies. Fall Risk Score: 3 (? 5 = High Risk)  MD Orders: Eval and Treat  MEDICAL/REFERRING DIAGNOSIS:  Posterior tibial tendinitis, right leg [M76.821]   DATE OF ONSET: Patient fell at work in early , current right surgery for Posterior Tibial tendonitis 2021  REFERRING PHYSICIAN: Syed Martin MD  RETURN PHYSICIAN APPOINTMENT 2021     PROCEDURE: 1.  Right medial displacement calcaneal osteotomy                             2.  Right flexor digitorum longus tendon transfer                             3.  Right spring ligament reconstruction of the ankle                             4.  Right posterior tibial tendon debridement with advancement of tendon                             2.  TMFCL gastrocnemius recession         Pre-treatment Symptoms/Complaints: Patient reports some soreness on the bottom of right foot and anterior tib from walking a lot this weekend   Pain: Initial:1/10  Medications Last Reviewed:  2021     Post Session: 0/10   Updated Objective Findings: Improved gait        TREATMENT:   THERAPEUTIC EXERCISE: (33 minutes):  Exercises per grid below to improve mobility, strength and balance.   Required minimal visual, verbal and manual cues to promote proper body alignment and promote proper body posture. Progressed resistance and complexity of movement as indicated. Date:  7/12/2021 Date:  7/20/2021 Date  7/22/2021 Date:  7/28/2021 Date:  7/30/2021 Date:  8/2/2021   Activity/Exercise         Education         Nu Step 8min Level 5 8min Level 5 8 min 8min Level 5 8min Level 5 8min Level 5   Gastroc Sol stretch  8g22ljq each wedge 2h41vby each wedge 9b97upq each wedge 2d95aoa each 5d29mwl each   Squat Bench 3x5 touch orange Abd  Bench 3x5 touch orange Abd Bench 3x5 15# touch purple Abd Bench 3x8 20# touch Box 3x8 20# touch   3 way hip swing  2x10 pad lateral wedge 2x10 pad 2x10 pad 2x10 pad     Calf raise 3x10 3x10 pad 3x10 pad 3x12 pad 3x12 pad Right 3x5 w/ UE assist   Eccentric calf      20x right 2 up 1 down   SL Balance 5 x 15sec    9q50eox pad 2t17tqc pad   Ankle Eversion 3x10 yellow seated        Shuttle press  3x10 25# right 3x10 25lbs  3x10 50# right     Shuttle Calf raise  3x10 12.5 right 3x10 12.5 right 3x10 25# right     SAÚL     3 way 2 x 30x each yellow 3 way 2 x 30x each red   Walking Lunge      [place]                  THERAPEUTIC ACTIVITY: ( 10 minutes): Activities per gid below to improve functional movement related mobility, strength and balance to improve neuro-muscular carryover to daily functional activities for improving patient's quality of life. Required visual, verbal and manual cues to promote proper body alignment and promote proper body posture/mechanics. Progressed resistance and complexity of movement as indicated.      Date:  7/8/2021 Date:  7/12/2021 Date  7/22/2021 Date  7/28/2021 Date  7/30/2021 Date  8/2/2021   Activity/Exercise         Lateral walk 3x20' orange in brace 3x20' orange in brace 3x20' orange in brace 4x20' purple in brace     Monster walk 3x20' orange in brace 3x20' orange in brace 3x20' orange in brace 4x20' purple in brace     Lo carry   3 laps 2 10# 3 laps 2 10# 4 laps 2 10# 4 laps 2 15# 4 laps 2 15#   Cable pull walk [employer]       20x 30# 20x 33#                                                         MANUAL THERAPY: (0 minutes): Joint mobilization, Soft tissue mobilization was utilized and necessary because of the patient's restricted joint motion and restricted motion of soft tissue mobility. Date  8/2/2021    Technique Used Grade  Level # Time(s) Effect while being performed                                      Soft tissue mobilization  Right ankle 0min                        HEP Log Date 1.    8/2/2021   2.  8/2/2021   3. 8/2/2021   4.    5.           [employer] Portal  Treatment/Session Summary:    Response to Treatment: Patient improving with SL stability with activity     Communication/Consultation:  Activity progression   Equipment provided today: Not today   Recommendations/Intent for next treatment session:   Next visit will focus on Manual Therapy Core Stability Pain Science Education Quad strengthening Hip strengthening soft tissue mobilization Gait. Treatment Plan of Care Effective Dates: 6/2/2021 TO 8/31/2021 (90 days).   Frequency/Duration: 2 times a week for 90 Days             Total Treatment Billable Duration:   43 min  Rx   PT Patient Time In/Time Out  Time In: 1435  Time Out: JOSE Rodríugez    Future Appointments   Date Time Provider Shukri Perez   8/4/2021  1:45 PM Fredn Camryn, PT [facility]   8/9/2021  1:45 PM Darolyn Camryn, PT [facility]   8/11/2021  1:45 PM [facility]   8/13/2021 10:40 AM Nissa Roman MD [facility] PO[facility]   8/16/2021  1:45 PM Raulolyn Camryn, PT [facility]   8/18/2021  1:45 PM Darolyn Kalcarito, PT [facility]   11/4/2021 10:15 AM MD Guy Jacobs

## 2021-08-04 ENCOUNTER — HOSPITAL ENCOUNTER (OUTPATIENT)
Dept: PHYSICAL THERAPY | Age: 26
Discharge: HOME | End: 2021-08-04
Payer: COMMERCIAL

## 2021-08-04 PROCEDURE — 97110 THERAPEUTIC EXERCISES: CPT

## 2021-08-04 PROCEDURE — 97530 THERAPEUTIC ACTIVITIES: CPT

## 2021-08-04 NOTE — PROGRESS NOTES
Shailesh Dumont  : 1995  Payor: Rosalee Freeman / [place] [employer] / Product Type: Workers Comp /  Lu Tejeda at [address] [place] [address], [address] [place] [address], [address] [place] [address]  [phone]   [phone]                                                          Cassandra Hernandez MD      OUTPATIENT PHYSICAL THERAPY: Daily Treatment Note 2021 Visit Count:  16,  new order    ICD-10: Treatment Diagnosis:  Pain in right ankle and joints of right foot (M25.571)  Posterior tibial tendinitis, right leg (W65.951)  Encounter for other specified surgical aftercare (Z48.89)  Other abnormalities of gait and mobility (R26.89)     Precautions/Allergies:   Patient has no known allergies. Fall Risk Score: 3 (? 5 = High Risk)  MD Orders: Eval and Treat  MEDICAL/REFERRING DIAGNOSIS:  Posterior tibial tendinitis, right leg [M76.821]   DATE OF ONSET: Patient fell at work in early , current right surgery for Posterior Tibial tendonitis 2021  REFERRING PHYSICIAN: Cassandra Hernandez MD  [address] [place] [address] 2021     PROCEDURE: 1.  Right medial displacement calcaneal osteotomy                             2.  Right flexor digitorum longus tendon transfer                             3.  Right spring ligament reconstruction of the ankle                             4.  Right posterior tibial tendon debridement with advancement of tendon                             5.  GDCNS gastrocnemius recession         Pre-treatment Symptoms/Complaints: Patient reports just muscle soreness   Pain: Initial:0/10  Medications Last Reviewed:  2021     Post Session: 0/10   Updated Objective Findings: Improved gait        TREATMENT:   THERAPEUTIC EXERCISE: (30 minutes):  Exercises per grid below to improve mobility, strength and balance. Required minimal visual, verbal and manual cues to promote proper body alignment and promote proper body posture.   Progressed resistance and complexity of movement as indicated. Date:  7/20/2021 Date  7/22/2021 Date:  7/28/2021 Date:  7/30/2021 Date:  8/2/2021 Date:  8/4/2021   Activity/Exercise         Education         Nu Step 8min Level 5 8 min 8min Level 5 8min Level 5 8min Level 5 8min Level 6   Gastroc Sol stretch 9m82vsz each wedge 0x80pvs each wedge 4y30mxs each wedge 1n71wpo each 4d63elh each 6k46jyq each   Squat  Bench 3x5 touch orange Abd Bench 3x5 15# touch purple Abd Bench 3x8 20# touch Box 3x8 20# touch Box 3x8 25# touch   3 way hip swing  2x10 pad 2x10 pad 2x10 pad      Calf raise 3x10 pad 3x10 pad 3x12 pad 3x12 pad Right 3x5 w/ UE assist Right 3x8 w/ UE assist   Eccentric calf     20x right 2 up 1 down 20x right 2 up 1 down stairs   SL Balance    1a69bgc pad 1p09yix pad 5q49vrd pad   Ankle Eversion         Shuttle press 3x10 25# right 3x10 25lbs  3x10 50# right      Shuttle Calf raise 3x10 12.5 right 3x10 12.5 right 3x10 25# right      SAÚL    3 way 2 x 30x each yellow 3 way 2 x 30x each red    Walking Lunge     NV 3x20'                  THERAPEUTIC ACTIVITY: ( 13 minutes): Activities per gid below to improve functional movement related mobility, strength and balance to improve neuro-muscular carryover to daily functional activities for improving patient's quality of life. Required visual, verbal and manual cues to promote proper body alignment and promote proper body posture/mechanics. Progressed resistance and complexity of movement as indicated.      Date:  7/12/2021 Date  7/22/2021 Date  7/28/2021 Date  7/30/2021 Date  8/2/2021 Date  8/4/2021   Activity/Exercise         Lateral walk 3x20' orange in brace 3x20' orange in brace 4x20' purple in brace   4x20' purple in brace   Monster walk 3x20' orange in brace 3x20' orange in brace 4x20' purple in brace   4x20' purple in brace   Lo carry 3 laps 2 10# 3 laps 2 10# 4 laps 2 10# 4 laps 2 15# 4 laps 2 15#    Cable pull walk       20x 30# 20x 33# 20x 37#                                                      MANUAL THERAPY: (0 minutes): Joint mobilization, Soft tissue mobilization was utilized and necessary because of the patient's restricted joint motion and restricted motion of soft tissue mobility. Date  8/4/2021    Technique Used Grade  Level # Time(s) Effect while being performed                                      Soft tissue mobilization  Right ankle 0min                        HEP Log Date 1.    8/4/2021   2.  8/4/2021   3. 8/4/2021   4.    5.           [employer] Portal  Treatment/Session Summary:    Response to Treatment: Patient was challenged with increase but only with fatigue no reported pain. Communication/Consultation:  Activity progression   Equipment provided today: Not today   Recommendations/Intent for next treatment session:   Next visit will focus on Manual Therapy Core Stability Pain Science Education Quad strengthening Hip strengthening soft tissue mobilization Gait. Treatment Plan of Care Effective Dates: 6/2/2021 TO 8/31/2021 (90 days).   Frequency/Duration: 2 times a week for 90 Days             Total Treatment Billable Duration:   43 min  Rx   PT Patient Time In/Time Out  Time In: 1330  Time Out: Herb Barahona 1998, PT    Future Appointments   Date Time Provider Shukri Perez   8/9/2021  1:45 PM Tucker Olguin PT [facility]   8/11/2021  1:45 PM Tucker Olguin PT [facility]   8/13/2021 10:40 AM Flower [place] MD [facility]   8/16/2021  1:45 PM Tucker Olguin PT [facility]   8/18/2021  1:45 PM Tucker Olguin PT [facility]   11/4/2021 10:15 AM MD Chuy Green

## 2021-08-09 ENCOUNTER — HOSPITAL ENCOUNTER (OUTPATIENT)
Dept: PHYSICAL THERAPY | Age: 26
Discharge: HOME | End: 2021-08-09
Payer: COMMERCIAL

## 2021-08-09 PROCEDURE — 97530 THERAPEUTIC ACTIVITIES: CPT

## 2021-08-09 PROCEDURE — 97110 THERAPEUTIC EXERCISES: CPT

## 2021-08-09 NOTE — PROGRESS NOTES
Anitha Harkins  : 1995  Payor: Jessica Fernando / [place] [employer] [place] [employer] / Product Type: Workers Comp /  [address] [facility] at [address] [place] [address], [address] [place] [address]  [phone]   [phone]                                                          Julian Shirley MD      OUTPATIENT PHYSICAL THERAPY: Daily Treatment Note 2021 Visit Count:  17, 10/12 new order    ICD-10: Treatment Diagnosis:  Pain in right ankle and joints of right foot (M25.571)  Posterior tibial tendinitis, right leg (U75.118)  Encounter for other specified surgical aftercare (Z48.89)  Other abnormalities of gait and mobility (R26.89)     Precautions/Allergies:   Patient has no known allergies. Fall Risk Score: 3 (? 5 = High Risk)  MD Orders: Eval and Treat  MEDICAL/REFERRING DIAGNOSIS:  Posterior tibial tendinitis, right leg [M76.821]   DATE OF ONSET: Patient fell at work in early , current right surgery for Posterior Tibial tendonitis 2021  REFERRING PHYSICIAN: Julian Shirley MD  [address] [place] [address] 2021     PROCEDURE: 1.  Right medial displacement calcaneal osteotomy                             2.  Right flexor digitorum longus tendon transfer                             3.  Right spring ligament reconstruction of the ankle                             4.  Right posterior tibial tendon debridement with advancement of tendon                             6.  LTGCU gastrocnemius recession         Pre-treatment Symptoms/Complaints: Patient reports some increased soreness and not putting weight evenly thru foot   Pain: Initial:0/10  Medications Last Reviewed:  2021     Post Session: 0/10   Updated Objective Findings: Increased supination right foot        TREATMENT:   THERAPEUTIC EXERCISE: (30 minutes):  Exercises per grid below to improve mobility, strength and balance.   Required minimal visual, verbal and manual cues to promote proper body alignment and promote proper body posture. Progressed resistance and complexity of movement as indicated. Date  7/22/2021 Date:  7/28/2021 Date:  7/30/2021 Date:  8/2/2021 Date:  8/4/2021 Date:  8/9/2021   Activity/Exercise         Education         Nu Step 8 min 8min Level 5 8min Level 5 8min Level 5 8min Level 6 8min Level 5 hills   Gastroc Sol stretch 4c85lus each wedge 6p62vra each wedge 3m02mww each 1m60ljk each 0w55wxo each 9q88bon each   Squat Bench 3x5 touch orange Abd Bench 3x5 15# touch purple Abd Bench 3x8 20# touch Box 3x8 20# touch Box 3x8 25# touch    Gait      5 min   3 way hip swing  2x10 pad 2x10 pad       Calf raise 3x10 pad 3x12 pad 3x12 pad Right 3x5 w/ UE assist Right 3x8 w/ UE assist Right 3x8 w/ UE assist no brace   Eccentric calf    20x right 2 up 1 down 20x right 2 up 1 down stairs 20x right 2 up 1 down  No brace   SL Balance   2v04gfo pad 3t71byd pad 7u78zwz pad 5u41vds pad no brace   Ankle Eversion         Shuttle press 3x10 25lbs  3x10 50# right       Shuttle Calf raise 3x10 12.5 right 3x10 25# right       SAÚL   3 way 2 x 30x each yellow 3 way 2 x 30x each red     Walking Lunge    NV 3x20'                   THERAPEUTIC ACTIVITY: ( 10 minutes): Activities per gid below to improve functional movement related mobility, strength and balance to improve neuro-muscular carryover to daily functional activities for improving patient's quality of life. Required visual, verbal and manual cues to promote proper body alignment and promote proper body posture/mechanics. Progressed resistance and complexity of movement as indicated.      Date  7/22/2021 Date  7/28/2021 Date  7/30/2021 Date  8/2/2021 Date  8/4/2021 Date  8/9/2021   Activity/Exercise         Lateral walk 3x20' orange in brace 4x20' purple in brace   4x20' purple in brace 4x20' yellow forefoot no brace   Monster walk 3x20' orange in brace 4x20' purple in brace   4x20' purple in brace    Lo carry 3 laps 2 10# 4 laps 2 10# 4 laps 2 15# 4 laps 2 15#  4 laps 2 20#   Cable pull walk     20x 30# 20x 33# 20x 37#                                                          MANUAL THERAPY: (0 minutes): Joint mobilization, Soft tissue mobilization was utilized and necessary because of the patient's restricted joint motion and restricted motion of soft tissue mobility. Date  8/9/2021    Technique Used Grade  Level # Time(s) Effect while being performed                                      Soft tissue mobilization  Right ankle 0min                        HEP Log Date 1.    8/9/2021   2.  8/9/2021   3. 8/9/2021   4.    5.           [employer] Portal  Treatment/Session Summary:    Response to Treatment: Patient had some mid foot pain transferring weight from heel to toes. Communication/Consultation:  Gait toe off   Equipment provided today: Not today   Recommendations/Intent for next treatment session:   Next visit will focus on Manual Therapy Core Stability Pain Science Education Quad strengthening Hip strengthening soft tissue mobilization Gait. Treatment Plan of Care Effective Dates: 6/2/2021 TO 8/31/2021 (90 days).   Frequency/Duration: 2 times a week for 90 Days             Total Treatment Billable Duration:   40 min  Rx   PT Patient Time In/Time Out  Time In: 1345  Time Out: 195 [facility], PT    Future Appointments   Date Time Provider Shukri Perez   8/11/2021  1:45 PM Tabatha Gardner PT [facility]   8/13/2021 10:40 AM Vadim Ulrich MD [facility]   8/16/2021  1:45 PM Tabatha Gardner PT [facility]   8/18/2021  1:45 PM Tabatha Gardner PT [facility]   11/4/2021 10:15 AM MD Linda Piedra

## 2021-08-11 ENCOUNTER — HOSPITAL ENCOUNTER (OUTPATIENT)
Dept: PHYSICAL THERAPY | Age: 26
Discharge: HOME | End: 2021-08-11
Payer: COMMERCIAL

## 2021-08-16 ENCOUNTER — HOSPITAL ENCOUNTER (OUTPATIENT)
Dept: PHYSICAL THERAPY | Age: 26
Discharge: HOME | End: 2021-08-16
Payer: COMMERCIAL

## 2021-08-16 PROCEDURE — 97110 THERAPEUTIC EXERCISES: CPT

## 2021-08-16 PROCEDURE — 97530 THERAPEUTIC ACTIVITIES: CPT

## 2021-08-16 NOTE — PROGRESS NOTES
Katherine Bravo  : 1995  Payor: Conner Elkins / [place] [employer] / Product Type: Workers Comp /  [address] [facility] at [address] [place] [address], [address] [place] [address]  [phone]   [phone]                                                          Florian Davenport MD      OUTPATIENT PHYSICAL THERAPY: Daily Treatment Note 2021 Visit Count:  18,  new order    ICD-10: Treatment Diagnosis:  Pain in right ankle and joints of right foot (M25.571)  Posterior tibial tendinitis, right leg (U12.211)  Encounter for other specified surgical aftercare (Z48.89)  Other abnormalities of gait and mobility (R26.89)     Precautions/Allergies:   Patient has no known allergies. Fall Risk Score: 3 (? 5 = High Risk)  MD Orders: Eval and Treat  MEDICAL/REFERRING DIAGNOSIS:  Posterior tibial tendinitis, right leg [M76.821]   DATE OF ONSET: Patient fell at work in early , current right surgery for Posterior Tibial tendonitis 2021  REFERRING PHYSICIAN: Florian Davenport MD  RETURN PHYSICIAN APPOINTMENT 2021     PROCEDURE: 1.  Right medial displacement calcaneal osteotomy                             2.  Right flexor digitorum longus tendon transfer                             3.  Right spring ligament reconstruction of the ankle                             4.  Right posterior tibial tendon debridement with advancement of tendon                             0.  AJXCN gastrocnemius recession         Pre-treatment Symptoms/Complaints: Patient reports seeing MD last week and he ordered continued physical therapy   Pain: Initial:0/10  Medications Last Reviewed:  2021     Post Session: 0/10   Updated Objective Findings: Increased supination right foot        TREATMENT:   THERAPEUTIC EXERCISE: (30 minutes):  Exercises per grid below to improve mobility, strength and balance.   Required minimal visual, verbal and manual cues to promote proper body alignment and promote proper body posture. Progressed resistance and complexity of movement as indicated. Date:  7/28/2021 Date:  7/30/2021 Date:  8/2/2021 Date:  8/4/2021 Date:  8/9/2021 Date:  8/16/2021   Activity/Exercise         Education         Nu Step 8min Level 5 8min Level 5 8min Level 5 8min Level 6 8min Level 5 hills 8min Level 6   Gastroc Sol stretch 9l86kqc each wedge 1c02gvk each 0f30cvl each 6i40azx each 3b34pph each 5g38iiq each   Squat Bench 3x5 15# touch purple Abd Bench 3x8 20# touch Box 3x8 20# touch Box 3x8 25# touch     Gait     5 min 5 min   3 way hip swing  2x10 pad        Calf raise 3x12 pad 3x12 pad Right 3x5 w/ UE assist Right 3x8 w/ UE assist Right 3x8 w/ UE assist no brace    Eccentric calf   20x right 2 up 1 down 20x right 2 up 1 down stairs 20x right 2 up 1 down  No brace 20x right 2 up 1 down  No brace   SL Balance  4m91lcw pad 7b19fmj pad 1b78fie pad 8h58cpk pad no brace    Ankle Eversion         Shuttle press 3x10 50# right        Shuttle Calf raise 3x10 25# right        SAÚL  3 way 2 x 30x each yellow 3 way 2 x 30x each red      Walking Lunge   NV 3x20'     Toe / Heel walk      3x20'         THERAPEUTIC ACTIVITY: ( 13 minutes): Activities per gid below to improve functional movement related mobility, strength and balance to improve neuro-muscular carryover to daily functional activities for improving patient's quality of life. Required visual, verbal and manual cues to promote proper body alignment and promote proper body posture/mechanics. Progressed resistance and complexity of movement as indicated.      Date  7/28/2021 Date  7/30/2021 Date  8/2/2021 Date  8/4/2021 Date  8/9/2021 Date  8/9/2021   Activity/Exercise         Lateral walk 4x20' purple in brace   4x20' purple in brace 4x20' yellow forefoot no brace 4x20' yellow forefoot no brace   Monster walk 4x20' purple in brace   4x20' purple in brace     Lo carry 4 laps 2 10# 4 laps 2 15# 4 laps 2 15#  4 laps 2 20# 4 laps 2 20# Cable pull walk   20x 30# 20x 33# 20x 37#  Back 37# 10x  lateral 10x each 27#                                                         MANUAL THERAPY: (0 minutes): Joint mobilization, Soft tissue mobilization was utilized and necessary because of the patient's restricted joint motion and restricted motion of soft tissue mobility. Date  8/16/2021    Technique Used Grade  Level # Time(s) Effect while being performed                                      Soft tissue mobilization  Right ankle 0min                        HEP Log Date 1.    8/16/2021   2.  8/16/2021   3. 8/16/2021   4.    5.           [employer] Portal  Treatment/Session Summary:    Response to Treatment: Patient had good tolerance to increase with less supination during gait . Communication/Consultation:  Gait toe off   Equipment provided today: Not today   Recommendations/Intent for next treatment session:   Next visit will focus on Manual Therapy Core Stability Pain Science Education Quad strengthening Hip strengthening soft tissue mobilization Gait. Treatment Plan of Care Effective Dates: 6/2/2021 TO 8/31/2021 (90 days).   Frequency/Duration: 2 times a week for 90 Days             Total Treatment Billable Duration:   40 min  Rx   PT Patient Time In/Time Out  Time In: 1345  Time Out: 195 Luis Coleman, PT    Future Appointments   Date Time Provider Shukri Perez   8/18/2021  1:45 PM Gladys Campbell PT [facility]   10/13/2021 10:00 AM Fang Ulrich MD [facility]   11/4/2021 10:15 AM Michelle Garcia MD [facility]

## 2021-08-18 ENCOUNTER — HOSPITAL ENCOUNTER (OUTPATIENT)
Dept: PHYSICAL THERAPY | Age: 26
Discharge: HOME | End: 2021-08-18
Payer: COMMERCIAL

## 2021-08-18 PROCEDURE — 97530 THERAPEUTIC ACTIVITIES: CPT

## 2021-08-18 PROCEDURE — 97110 THERAPEUTIC EXERCISES: CPT

## 2021-08-18 NOTE — PROGRESS NOTES
Kait Ramachandran  : 1995  Payor: Christal Montoya / [place] [employer] / Product Type: Workers Comp /  [address] [facility] at [address] [place] [address], [facility], [address] [place] [address]  [phone]   [phone]                                                          Gisela Milton MD      OUTPATIENT PHYSICAL THERAPY: Daily Treatment Note 2021 Visit Count:  ,  new order    ICD-10: Treatment Diagnosis:  Pain in right ankle and joints of right foot (M25.571)  Posterior tibial tendinitis, right leg (Y09.519)  Encounter for other specified surgical aftercare (Z48.89)  Other abnormalities of gait and mobility (R26.89)     Precautions/Allergies:   Patient has no known allergies. Fall Risk Score: 3 (? 5 = High Risk)  MD Orders: Eval and Treat  MEDICAL/REFERRING DIAGNOSIS:  Posterior tibial tendinitis, right leg [M76.821]   DATE OF ONSET: Patient fell at work in early , current right surgery for Posterior Tibial tendonitis 2021  REFERRING PHYSICIAN: Gisela Milton MD  RETURN PHYSICIAN APPOINTMENT 2021     PROCEDURE: 1.  Right medial displacement calcaneal osteotomy                             2.  Right flexor digitorum longus tendon transfer                             3.  Right spring ligament reconstruction of the ankle                             4.  Right posterior tibial tendon debridement with advancement of tendon                             4.  [facility] gastrocnemius recession         Pre-treatment Symptoms/Complaints: Patient reports walking a little better with less foot tingling   Pain: Initial:0/10  Medications Last Reviewed:  2021     Post Session: 0/10   Updated Objective Findings: Normalized gait        TREATMENT:   THERAPEUTIC EXERCISE: (32 minutes):  Exercises per grid below to improve mobility, strength and balance. Required minimal visual, verbal and manual cues to promote proper body alignment and promote proper body posture. Progressed resistance and complexity of movement as indicated. Date:  7/30/2021 Date:  8/2/2021 Date:  8/4/2021 Date:  8/9/2021 Date:  8/16/2021   Activity/Exercise        Education        Nu Step 8min Level 5 8min Level 5 8min Level 6 8min Level 5 hills 8min Level 6   Gastroc Sol stretch 5z78yte each 8g66mnq each 5v79nub each 1h58okd each 0y58fgp each   Squat Bench 3x8 20# touch Box 3x8 20# touch Box 3x8 25# touch     Gait    5 min 5 min   3 way hip swing         Calf raise 3x12 pad Right 3x5 w/ UE assist Right 3x8 w/ UE assist Right 3x8 w/ UE assist no brace Right 3x10 w/ UE assist no brace   Eccentric calf  20x right 2 up 1 down 20x right 2 up 1 down stairs 20x right 2 up 1 down  No brace 20x right 2 up 1 down  No brace   SL Balance 1m23lew pad 3o70ypq pad 5g21lyi pad 7p03gui pad no brace Bosu 2l56gcl   Ankle Eversion        Shuttle press        Shuttle Calf raise        SAÚL 3 way 2 x 30x each yellow 3 way 2 x 30x each red      Walking Lunge  NV 3x20'  3x20' 2 5#   Toe / Heel walk     3x20'   Step up Bosu     20x right                 THERAPEUTIC ACTIVITY: ( 23 minutes): Activities per gid below to improve functional movement related mobility, strength and balance to improve neuro-muscular carryover to daily functional activities for improving patient's quality of life. Required visual, verbal and manual cues to promote proper body alignment and promote proper body posture/mechanics. Progressed resistance and complexity of movement as indicated.      Date  7/30/2021 Date  8/2/2021 Date  8/4/2021 Date  8/9/2021 Date  8/16/2021 Date  8/18/2021   Activity/Exercise         Lateral walk   4x20' purple in brace 4x20' yellow forefoot no brace 4x20' yellow forefoot no brace 4x20' purple in brace   Monster walk   4x20' purple in brace   4x20' purple in brace   Lo carry 4 laps 2 15# 4 laps 2 15#  4 laps 2 20# 4 laps 2 20# 4 laps 2 20# w/wrist strap   Cable pull walk 20x 30# 20x 33# 20x 37#  Back 37# 10x  lateral 10x each 27# Back 40# 10x  lateral 10x each 27#                                                         MANUAL THERAPY: (0 minutes): Joint mobilization, Soft tissue mobilization was utilized and necessary because of the patient's restricted joint motion and restricted motion of soft tissue mobility. Date  8/18/2021    Technique Used Grade  Level # Time(s) Effect while being performed                                      Soft tissue mobilization  Right ankle 0min                        HEP Log Date 1.    8/18/2021   2.  8/18/2021   3. 8/18/2021   4.    5.           [employer] Portal  Treatment/Session Summary:    Response to Treatment: Patient was challenged with increase, no pain with progression . Communication/Consultation:  Gait toe off   Equipment provided today: Not today   Recommendations/Intent for next treatment session:   Next visit will focus on Manual Therapy Core Stability Pain Science Education Quad strengthening Hip strengthening soft tissue mobilization Gait. Treatment Plan of Care Effective Dates: 6/2/2021 TO 8/31/2021 (90 days).   Frequency/Duration: 2 times a week for 90 Days             Total Treatment Billable Duration:   55 min  Rx   PT Patient Time In/Time Out  Time In: (P) 1350  Time Out: (P) 1450     Radha Mon PT    Future Appointments   Date Time Provider Shukri Perez   8/23/2021  1:45 PM Toan Jason PT [facility]   8/25/2021  2:30 PM Toan Jason PT [facility]   10/13/2021 10:00 AM Silas Mendoza MD [facility]   11/4/2021 10:15 AM MD Vicente Workman

## 2021-08-23 ENCOUNTER — HOSPITAL ENCOUNTER (OUTPATIENT)
Dept: PHYSICAL THERAPY | Age: 26
Discharge: HOME | End: 2021-08-23
Payer: COMMERCIAL

## 2021-08-23 PROCEDURE — 97140 MANUAL THERAPY 1/> REGIONS: CPT

## 2021-08-23 PROCEDURE — 97110 THERAPEUTIC EXERCISES: CPT

## 2021-08-23 PROCEDURE — 97530 THERAPEUTIC ACTIVITIES: CPT

## 2021-08-23 NOTE — PROGRESS NOTES
Ramon Chang  : 1995  Payor: Sen Lee / [place] [employer] / Product Type: Workers Comp /  [facility] at [address] [place] [address], [facility], [address] [place] [address]  [phone]   [phone]                                                          Jorge Mike MD      OUTPATIENT PHYSICAL THERAPY: Daily Treatment Note 2021 Visit Count:  ,  new order    ICD-10: Treatment Diagnosis:  Pain in right ankle and joints of right foot (M25.571)  Posterior tibial tendinitis, right leg (J09.525)  Encounter for other specified surgical aftercare (Z48.89)  Other abnormalities of gait and mobility (R26.89)     Precautions/Allergies:   Patient has no known allergies. Fall Risk Score: 3 (? 5 = High Risk)  MD Orders: Eval and Treat  MEDICAL/REFERRING DIAGNOSIS:  Posterior tibial tendinitis, right leg [M76.821]   DATE OF ONSET: Patient fell at work in early , current right surgery for Posterior Tibial tendonitis 2021  REFERRING PHYSICIAN: Jorge Mike MD  RETURN PHYSICIAN APPOINTMENT 2021     PROCEDURE: 1.  Right medial displacement calcaneal osteotomy                             2.  Right flexor digitorum longus tendon transfer                             3.  Right spring ligament reconstruction of the ankle                             4.  Right posterior tibial tendon debridement with advancement of tendon                             6.  QIJHA gastrocnemius recession         Pre-treatment Symptoms/Complaints: Patient reports walking less on the side of right foot   Pain: Initial:0/10  Medications Last Reviewed:  2021     Post Session: 0/10   Updated Objective Findings: Normalized gait        TREATMENT:   THERAPEUTIC EXERCISE: (30 minutes):  Exercises per grid below to improve mobility, strength and balance. Required minimal visual, verbal and manual cues to promote proper body alignment and promote proper body posture. Progressed resistance and complexity of movement as indicated. Date:  8/2/2021 Date:  8/4/2021 Date:  8/9/2021 Date:  8/16/2021 Date:  8/18/2021 Date:  8/23/2021   Activity/Exercise         Education         Nu Step 8min Level 5 8min Level 6 8min Level 5 hills 8min Level 5 hills 8min Level 6 8min Level 6   Gastroc Sol stretch 4e56zar each 5h47elx each 7w44upe each 6s11kfb each 3q82fvk each 3a97eql each   Squat Box 3x8 20# touch Box 3x8 25# touch       Gait   5 min  5 min    3 way hip swing          Calf raise Right 3x5 w/ UE assist Right 3x8 w/ UE assist Right 3x8 w/ UE assist no brace Right 3x8 w/ UE assist no brace Right 3x10 w/ UE assist no brace Right 3x10 w/ UE assist no brace   Eccentric calf 20x right 2 up 1 down 20x right 2 up 1 down stairs 20x right 2 up 1 down  No brace 20x right 2 up 1 down  No brace 20x right 2 up 1 down  No brace 15x right 2 up 1 down  No brace slower 5sec   Soleus Raise      3x10 20#   SL Balance 5g86vgl pad 6a27wej pad 6c31ekv pad no brace 1s21tpt pad no brace Bosu 1q27mcg Bosu 1v88nje   Ankle Eversion         Shuttle press         Shuttle Calf raise         SAÚL 3 way 2 x 30x each red        Walking Lunge NV 3x20'   3x20' 2 5#    Toe / Heel walk     3x20'    Step up Bosu     20x right 30x right   Ball Toss      NV         THERAPEUTIC ACTIVITY: ( 15 minutes): Activities per gid below to improve functional movement related mobility, strength and balance to improve neuro-muscular carryover to daily functional activities for improving patient's quality of life. Required visual, verbal and manual cues to promote proper body alignment and promote proper body posture/mechanics. Progressed resistance and complexity of movement as indicated.      Date  8/2/2021 Date  8/4/2021 Date  8/9/2021 Date  8/16/2021 Date  8/18/2021 Date  8/23/2021   Activity/Exercise         Lateral walk  4x20' purple in brace 4x20' yellow forefoot no brace 4x20' yellow forefoot no brace 4x20' purple in brace 6x20' yellow forefoot no brac   Monster walk  4x20' purple in brace   4x20' purple in brace    Lo carry 4 laps 2 15#  4 laps 2 20# 4 laps 2 20# 4 laps 2 20# w/wrist strap 5 laps 2 20# w/wrist strap   Cable pull walk 20x 33# 20x 37#  Back 37# 10x  lateral 10x each 27# Back 40# 10x  lateral 10x each 27# Back 43# 10x  lateral 10x each 30#                                                         MANUAL THERAPY: (8 minutes): Joint mobilization, Soft tissue mobilization was utilized and necessary because of the patient's restricted joint motion and restricted motion of soft tissue mobility. Date  8/23/2021    Technique Used Grade  Level # Time(s) Effect while being performed                                      Soft tissue mobilization  Right ankle 8min                        HEP Log Date 1.    8/23/2021   2.  8/23/2021   3. 8/23/2021   4.    5.           [employer] Portal  Treatment/Session Summary:    Response to Treatment: Patient had good mobility with PROM to right ankle . Communication/Consultation:  Progression of stability exercises   Equipment provided today: Not today   Recommendations/Intent for next treatment session:   Next visit will focus on Manual Therapy Core Stability Pain Science Education Quad strengthening Hip strengthening soft tissue mobilization Gait. Treatment Plan of Care Effective Dates: 6/2/2021 TO 8/31/2021 (90 days).   Frequency/Duration: 2 times a week for 90 Days             Total Treatment Billable Duration:   53 min  Rx   PT Patient Time In/Time Out  Time In: 1348  Time Out: Ping Urbina [address], PT    Future Appointments   Date Time Provider Shukri Arizmendii   8/25/2021  2[facility]   10/13/2021 10:00 AM Shai Ulrich MD [facility]   11/4/2021 10:15 AM MD Wale SinghRoberts Chapelsunny Dao

## 2021-08-25 ENCOUNTER — HOSPITAL ENCOUNTER (OUTPATIENT)
Dept: PHYSICAL THERAPY | Age: 26
Discharge: HOME | End: 2021-08-25
Payer: COMMERCIAL

## 2021-08-25 PROCEDURE — 97530 THERAPEUTIC ACTIVITIES: CPT

## 2021-08-25 PROCEDURE — 97110 THERAPEUTIC EXERCISES: CPT

## 2021-08-25 NOTE — PROGRESS NOTES
Rivera Jordan  : 1995  Payor: Loyda Toribio / [place] [employer] / Product Type: Workers Comp /  [address] at [address], [address]  [phone]   [phone]                                                          Radha Orellana MD      OUTPATIENT PHYSICAL THERAPY: Daily Treatment Note 2021 Visit Count:  20, 13 new order    ICD-10: Treatment Diagnosis:  Pain in right ankle and joints of right foot (M25.571)  Posterior tibial tendinitis, right leg (F48.187)  Encounter for other specified surgical aftercare (Z48.89)  Other abnormalities of gait and mobility (R26.89)     Precautions/Allergies:   Patient has no known allergies. Fall Risk Score: 3 (? 5 = High Risk)  MD Orders: Eval and Treat  MEDICAL/REFERRING DIAGNOSIS:  Posterior tibial tendinitis, right leg [M76.821]   DATE OF ONSET: Patient fell at work in early , current right surgery for Posterior Tibial tendonitis 2021  REFERRING PHYSICIAN: Radha Orellana MD  RETURN PHYSICIAN APPOINTMENT 2021     PROCEDURE: 1.  Right medial displacement calcaneal osteotomy                             2.  Right flexor digitorum longus tendon transfer                             3.  Right spring ligament reconstruction of the ankle                             4.  Right posterior tibial tendon debridement with advancement of tendon                             7.  OFGSG gastrocnemius recession         Pre-treatment Symptoms/Complaints: Patient reports slowly improving with walking   Pain: Initial:0/10  Medications Last Reviewed:  2021     Post Session: 0/10   Updated Objective Findings: ROM WNL        TREATMENT:   THERAPEUTIC EXERCISE: (30 minutes):  Exercises per grid below to improve mobility, strength and balance. Required minimal visual, verbal and manual cues to promote proper body alignment and promote proper body posture.   Progressed resistance and complexity of movement as indicated. Date:  8/4/2021 Date:  8/9/2021 Date:  8/16/2021 Date:  8/18/2021 Date:  8/23/2021 Date:  8/25/2021   Activity/Exercise         Education         Nu Step 8min Level 6 8min Level 5 hills 8min Level 5 hills 8min Level 6 8min Level 6 8min Level 6   Gastroc Sol stretch 5r12bqb each 7v06ccg each 9y94xpv each 6e42jsj each 0j69phq each 1u32ljz each   Squat Box 3x8 25# touch        Gait  5 min  5 min     3 way hip swing          Calf raise Right 3x8 w/ UE assist Right 3x8 w/ UE assist no brace Right 3x8 w/ UE assist no brace Right 3x10 w/ UE assist no brace Right 3x10 w/ UE assist no brace    Eccentric calf 20x right 2 up 1 down stairs 20x right 2 up 1 down  No brace 20x right 2 up 1 down  No brace 20x right 2 up 1 down  No brace 15x right 2 up 1 down  No brace slower 5sec 15x right 2 up 1 down  No brace slower 5sec   Soleus Raise     3x10 20#    SL Balance 1i11etl pad 5u34pib pad no brace 8q30uza pad no brace Bosu 5c67xof Bosu 3d70szc    Ankle Eversion         Shuttle press      3x10 100# bilat  2x10  50#  R/L   Shuttle Calf raise      2x10  50# right   SAÚL         Walking Lunge 3x20'   3x20' 2 5#     Toe / Heel walk    3x20'     Step up Bosu    20x right 30x right    Ball Toss     NV          THERAPEUTIC ACTIVITY: ( 15 minutes): Activities per gid below to improve functional movement related mobility, strength and balance to improve neuro-muscular carryover to daily functional activities for improving patient's quality of life. Required visual, verbal and manual cues to promote proper body alignment and promote proper body posture/mechanics. Progressed resistance and complexity of movement as indicated.      Date  8/4/2021 Date  8/9/2021 Date  8/16/2021 Date  8/18/2021 Date  8/23/2021 Date  8/25/2021   Activity/Exercise         Lateral walk 4x20' purple in brace 4x20' yellow forefoot no brace 4x20' yellow forefoot no brace 4x20' purple in brace 6x20' yellow forefoot no brac 4x20' purple @ knees w/ squat   Monster walk 4x20' purple in brace   4x20' purple in brace     Box Carry      20# 10x 30\" low/bench   Barbra Pepper carry  4 laps 2 20# 4 laps 2 20# 4 laps 2 20# w/wrist strap 5 laps 2 20# w/wrist strap    Cable pull walk 20x 37#  Back 37# 10x  lateral 10x each 27# Back 40# 10x  lateral 10x each 27# Back 43# 10x  lateral 10x each 30# Back 43# 10x  lateral 10x each 30#                                                         MANUAL THERAPY: (0 minutes): Joint mobilization, Soft tissue mobilization was utilized and necessary because of the patient's restricted joint motion and restricted motion of soft tissue mobility. Date  8/25/2021    Technique Used Grade  Level # Time(s) Effect while being performed                                      Soft tissue mobilization  Right ankle 0min                        HEP Log Date 1.    8/25/2021   2.  8/25/2021   3. 8/25/2021   4.    5.           [employer] Portal  Treatment/Session Summary:    Response to Treatment: Patient was progressed with functional lifting/carrying activity with good tolerance. Communication/Consultation:  Progression of stability exercises   Equipment provided today: Not today   Recommendations/Intent for next treatment session:   Next visit will focus on Manual Therapy Core Stability Pain Science Education Quad strengthening Hip strengthening soft tissue mobilization Gait. Treatment Plan of Care Effective Dates: 6/2/2021 TO 8/31/2021 (90 days).   Frequency/Duration: 2 times a week for 90 Days             Total Treatment Billable Duration:   45 min  Rx   PT Patient Time In/Time Out  Time In: 1430  Time Out: [address], PT    Future Appointments   Date Time Provider Shukri Perez   10/13/2021 10:00 AM Barbie Saba MD [facility]   11/4/2021 10:15 AM MD Linda Piedra

## 2021-09-02 ENCOUNTER — HOSPITAL ENCOUNTER (OUTPATIENT)
Dept: PHYSICAL THERAPY | Age: 26
Discharge: HOME | End: 2021-09-02
Payer: COMMERCIAL

## 2021-09-02 PROCEDURE — 97530 THERAPEUTIC ACTIVITIES: CPT

## 2021-09-02 PROCEDURE — 97110 THERAPEUTIC EXERCISES: CPT

## 2021-09-02 NOTE — THERAPY RECERTIFICATION
Ramez Branham  : 1995      Payor: Froylan Bosworth / [place] [employer] / Product Type: Workers Comp /  [address] at [address] [place] [address], [address] [place] [address]  [phone]   [phone]        OUTPATIENT PHYSICAL THERAPY:RECERTIFICATION: 4477    ICD-10: Treatment Diagnosis:  Pain in right ankle and joints of right foot (M25.571)  Posterior tibial tendinitis, right leg (K91.078)  Encounter for other specified surgical aftercare (Z48.89)  Other abnormalities of gait and mobility (R26.89)    Precautions/Allergies:   Patient has no known allergies. Fall Risk Score: 3 (? 5 = High Risk)  MD Orders: Eval and Treat  MEDICAL/REFERRING DIAGNOSIS:  Posterior tibial tendinitis, right leg [M76.821]   DATE OF ONSET: Patient fell at work in early , current right surgery for Posterior Tibial tendonitis 2021  REFERRING PHYSICIAN: Patsy Muñoz MD  [address] [place] [address] 2021    PROCEDURE: 1. Right medial displacement calcaneal osteotomy                             2.  Right flexor digitorum longus tendon transfer                             3.  Right spring ligament reconstruction of the ankle                             4.  Right posterior tibial tendon debridement with advancement of tendon                             5.  Right gastrocnemius recession     INITIAL ASSESSMENT:   Ms. Lorenzo Gonzalez presents to physical therapy with decreased strength, ROM, joint mobility, functional mobility, and gait. These S/S are consistent with Posterior tibial tendinitis, right leg [M76.821]. Patient will benefit from skilled physical therapy for manual therapeutic techniques (as appropriate), therapeutic exercises and activities, neuromuscular re-education, balance training and comprehensive home exercises program to address current impairments and functional limitations.     Ramez Branham will benefit from skilled PT (medically necessary) in order to address above deficits affecting participation in basic ADLs and overall functional tolerance. PROGRESS NOTE (8/25/2021):  Patient has been seen for 21 sessions of physical therapy from 6/2/2021 to 8/25/2021. Patient reports feeling 75% better with walking and daily activity. She remains limited with strength, stability and tolerance for weightbearing long periods required for work. Patient will benefit from continued skilled physical therapy to address remaining goals and deficits. PROBLEM LIST (Impacting functional limitations):   1. Decreased Strength  2. Decreased ADL/Functional Activities  3. Decreased Transfer Abilities  4. Decreased Ambulation Ability/Technique  5. Decreased Balance  6. Increased Pain  7. Decreased Activity Tolerance  8. Decreased [place] with Home Exercise Program INTERVENTIONS PLANNED:  1. Balance Exercise  2. Bed Mobility  3. Cold  4. Cryotherapy  5. Family Education  6. Gait Training  7. Heat  8. Home Exercise Program (HEP)  9. Manual Therapy  10. Neuromuscular Re-education/Strengthening  11. Range of Motion (ROM)  12. Therapeutic Activites  13. Therapeutic Exercise/Strengthening  14. Transfer Training  15. Ultrasound (US)   TREATMENT PLAN:  Effective Dates: 8/25/2021 TO 10/25/2021 (60 days). Frequency/Duration: 2 times a week for 60 Day   Short-Term Goals~4 weeks  Goal Met   1. Anitha Harkins will be compliant with home exercise program within 4 weeks in order to improve active participation with management of patient's symptoms and/or functional deficits. 1.  [x] Date: 7/12/2021   2. Anitha Harkins will report <=3/10 pain with walking >15 minutes in order to participate in daily exercise and daily activities 2. [x] Date: 7/12/2021   3. Anitha Harkins will be able to stand >60 minutes with <2/10 pain to feet in order to participate in household duties without issues/compromise. 3.  [x] Date: 8/25/2021   4.  [employer] Emerald Machado will report being able to sleep at night without waking up secondary to foot pain. 4.  [x] Date: 8/25/2021   5. 5. Davis Arguelles  will improve ankle dorsi flexion active ROM from 6 to 15 for normal gait  5. [x] Date: 7/12/2021   6. Davis Arguelles  will improve ankle strength to 4+ to 5/5 to improve tolerance of ADLs. 6.  [] Date:              Long Term Goals~8 weeks Goal Met   1. Davis Arguelles will be independent with home exercise program within 8 weeks in order to improve independence with management of patient's symptoms and/or functional deficits. 1.  [x] Date: 7/12/2021   2. Davis Arguelles will report no pain with walking affecting participation in activities of daily living. 2.  [] Date:   3. Davis Arguelles  will be able to stand >60 minutes without onset of foot pain. 3.  [x] Date: 8/25/2021   4. Davis Arguelles will improve PT/OT FOOT AND ANKLE ABILITY MEASURE to 50/116 to demonstrate increased tolerance to activity. 4.  [x] Date: 7/12/2021   5. Pt will report standing for > 60 minutes with minimal to no increase in pain in order to be able to stand for prolonged periods as needed to perform duties at work. 5.  [] Date:   6. Davis Arguelles will improve PT/OT FOOT AND ANKLE ABILITY MEASURE to 80/116 to demonstrate increased tolerance to activity. 6.  [] Date:     Outcome Measure: Tool Used: PT/OT FOOT AND ANKLE ABILITY MEASURE  Score:  Initial: 5/116 Most Recent: 54 (Date: 8/25/2021)   Interpretation of Score: For the \"Activities of Daily Living\", there are 21 questions each scored on a 5 point scale with 0 representing \"Unable to do\" and 4 representing \"No difficulty\". The lower the score, the greater the functional disability. 84/84 represents no disability. Minimal detectable change is 5.7 points.   With the addition of the 8 questions in the \"Sports Subscale,\" there are 29 questions, each scored on a 5 point scale with 0 representing \"Unable to do\" and 4 representing \"No difficulty\". The lower the score, the greater the functional disability. 116/116 represents no disability. Minimal detectable change is 12.3 points.     Observation/Orthostatic Postural Assessment:         Gait:  Slight decrease SLS right LE    Standing: Mild decreased WB right     Palpation:  Mild Tenderness to medial and lateral right foot    Girth Measurement:   Joint: Eval Date: 6/2/2021  Re-Assess Date: 7/12/2021 Re-Assess Date: 8/25/2021   Ankle Figure 8 Measurement RIGHT LEFT RIGHT RIGHT    49 cm 48 cm 48 cm WNL        AROM/PROM       Joint: Eval Date: 6/2/2021  Re-Assess Date: 7/12/2021 Re-Assess Date:  8/25/2021   Active ROM RIGHT LEFT RIGHT RIGHT   Ankle Dorsiflexion 6 15 10 12   Ankle Plantarflexion 45 55 46 49   Ankle Inversion 23 35 27 30   Ankle Eversion 10 22 15 18                 Passive ROM       Ankle Dorsiflexion NT WNL 13 WNL   Ankle Plantarflexion NT WNL 48 WNL   Ankle Inversion NT WNL 29 WNL   Ankle Eversion NT WNL 17 WNL     Strength:     Eval Date:6/2/2021  Re-Assess Date: 7/12/2021 Re-Assess Date: 8/25/2021     RIGHT LEFT RIGHT RIGHT   Ankle Dorsiflexion 4-/5 4+/5 4/5 4+/5   Ankle Plantarflexion 3+/5 4+/5 4-/5 4/5   Ankle Inversion 3/5 4+/5 4-/5 4+/5   Ankle Eversion 3+/5 4+/5 4-/5 4/5   Knee Flexion 4/5 5/5 4+/5 4+/5   Knee Extension 4/5 5/5 4+/5 5/5            Joint Mobility Eval Date: 6/2/2021  Re-Assess Date: 7/12/2021 Re-Assess Date: 8/25/2021    RIGHT LEFT RIGHT RIGHT   Subtalar Hypomobile Normal Hypomobile Normal   Talocrual Hypomobile Normal  Hypomobile Normal                   Special Tests:   Not tested post surgical    Neurological Screen:  No radiating symptoms down leg    Functional Mobility:  Limited tolerance of walking and standing  Heel/Toe walking= 20'  Calf Raise (Dbl/Single)=  SL =8   YU=141  Balance and Mobility:    Test Result   Timed up and Go 8 Seconds   30 second Sit to Stand 24   Single Leg Balance Right: <30 seconds     Left:<45 seconds             Medical Necessity:   ·    Skilled intervention continues to be required due to above deficits affecting participation in basic ADLs and overall functional tolerance. · Patient demonstrates capacity to improve ROM and strength which will increase independence, increase safety, and allow for return to previous activity level. · Patient demonstrates excellent rehab potential due to higher previous functional level. Reason for Services/Other Comments:  · Patient requires skilled intervention due to modification of therapeutic interventions to increase complexity of exercises . Rehabilitation Potential For Stated Goals: Excellent  Regarding Samantha Venegas's therapy, I certify that the treatment plan above will be carried out by a therapist or under their direction.   Thank you for this referral,  Loki Irving PT     Referring Physician Signature: Katharina Essex, MD _______________________________ Date _____________

## 2021-09-02 NOTE — PROGRESS NOTES
Katherine Bravo  : 1995  Payor: Conner Elkins / [place]: [place] [employer] / Product Type: Workers Comp /  [facility] at [address] [place], [address] [address]  [phone]   [phone]                                                          Florian Davenport MD      OUTPATIENT PHYSICAL THERAPY: Daily Treatment Note 2021 Visit Count:  22,  new order    ICD-10: Treatment Diagnosis:  Pain in right ankle and joints of right foot (M25.571)  Posterior tibial tendinitis, right leg (Q43.545)  Encounter for other specified surgical aftercare (Z48.89)  Other abnormalities of gait and mobility (R26.89)     Precautions/Allergies:   Patient has no known allergies. Fall Risk Score: 3 (? 5 = High Risk)  MD Orders: Eval and Treat  MEDICAL/REFERRING DIAGNOSIS:  Posterior tibial tendinitis, right leg [M76.821]   DATE OF ONSET: Patient fell at work in early , current right surgery for Posterior Tibial tendonitis 2021  REFERRING PHYSICIAN: Florian Davenport MD  [address] [place] [address] 2021     PROCEDURE: 1.  Right medial displacement calcaneal osteotomy                             2.  Right flexor digitorum longus tendon transfer                             3.  Right spring ligament reconstruction of the ankle                             4.  Right posterior tibial tendon debridement with advancement of tendon                             7.  OXAJM gastrocnemius recession         Pre-treatment Symptoms/Complaints: Patient reports being able to stand 1hr and 15 min   Pain: Initial:0/10  Medications Last Reviewed:  2021     Post Session: 0/10   Updated Objective Findings: See Recertification        TREATMENT:   THERAPEUTIC EXERCISE: (32 minutes):  Exercises per grid below to improve mobility, strength and balance. Required minimal visual, verbal and manual cues to promote proper body alignment and promote proper body posture.   Progressed resistance and complexity of movement as indicated. Date:  8/9/2021 Date:  8/16/2021 Date:  8/18/2021 Date:  8/23/2021 Date:  8/25/2021 Date:  9/2/2021   Activity/Exercise         Education         Nu Step 8min Level 5 hills 8min Level 5 hills 8min Level 6 8min Level 6 8min Level 6 8min Level 7 hills   Gastroc Sol stretch 1p00cvu each 0m15htk each 1l73xho each 1a74iku each 0m05lfm each 9x14unm each   Squat         Gait 5 min  5 min      3 way hip swing          Calf raise Right 3x8 w/ UE assist no brace Right 3x8 w/ UE assist no brace Right 3x10 w/ UE assist no brace Right 3x10 w/ UE assist no brace     Eccentric calf 20x right 2 up 1 down  No brace 20x right 2 up 1 down  No brace 20x right 2 up 1 down  No brace 15x right 2 up 1 down  No brace slower 5sec 15x right 2 up 1 down  No brace slower 5sec 20x right 2 up 1 down  No brace slower 5sec   Soleus Raise    3x10 20#     SL Balance 4z87bzk pad no brace 4w49jzd pad no brace Bosu 3v82xxg Bosu 5t91gri     Ankle Eversion         Shuttle press     3x10 100# bilat  2x10  50#  R/L 3x10 125# bilat  3x10  62.5#  R/L   Shuttle Calf raise     2x10  50# right 3x10 62.5#   SAÚL         Walking Lunge   3x20' 2 5#      Toe / Heel walk   3x20'      Step up Bosu   20x right 30x right     Ball Toss         RDL      3# 2x10         THERAPEUTIC ACTIVITY: ( 10 minutes): Activities per gid below to improve functional movement related mobility, strength and balance to improve neuro-muscular carryover to daily functional activities for improving patient's quality of life. Required visual, verbal and manual cues to promote proper body alignment and promote proper body posture/mechanics. Progressed resistance and complexity of movement as indicated.      Date  8/9/2021 Date  8/16/2021 Date  8/18/2021 Date  8/23/2021 Date  8/25/2021 Date  9/2/2021   Activity/Exercise         Lateral walk 4x20' yellow forefoot no brace 4x20' yellow forefoot no brace 4x20' purple in brace 6x20' yellow forefoot no brac 4x20' purple @ knees w/ squat 4x20' purple @ knees w/ squat   Monster walk   4x20' purple in brace      Box Carry     20# 10x 30' low/bench 30# 10x 30' low/bench   Farmer carry 4 laps 2 20# 4 laps 2 20# 4 laps 2 20# w/wrist strap 5 laps 2 20# w/wrist strap     Cable pull walk  Back 37# 10x  lateral 10x each 27# Back 40# 10x  lateral 10x each 27# Back 43# 10x  lateral 10x each 30# Back 43# 10x  lateral 10x each 30#                                                          MANUAL THERAPY: (0 minutes): Joint mobilization, Soft tissue mobilization was utilized and necessary because of the patient's restricted joint motion and restricted motion of soft tissue mobility. Date  9/2/2021    Technique Used Grade  Level # Time(s) Effect while being performed                                      Soft tissue mobilization  Right ankle 0min                        HEP Log Date 1.    9/2/2021   2.  9/2/2021   3. 9/2/2021   4.    5.           [facility] Portal  Treatment/Session Summary:    Response to Treatment: Patient was challenged with increased closed chain functional activity with some soreness reported at end of treatment. Communication/Consultation:  Progression of stability exercises   Equipment provided today: Not today   Recommendations/Intent for next treatment session:   Next visit will focus on Manual Therapy Core Stability Pain Science Education Quad strengthening Hip strengthening soft tissue mobilization Gait. Treatment Plan of Care Effective Dates: 8/25/2021 TO 10/25/2021 (90 days).   Frequency/Duration: 2 times a week for 90 Days             Total Treatment Billable Duration:   42 min  Rx   PT Patient Time In/Time Out  Time In: 1430  Time Out: 1100 Tracy Jett PT    Future Appointments   Date Time Provider Shukri Perez   9/8/2021  1:45 PM Clem Francisco PT [facility]   9/15/2021  1:45 PM JOSE Luke [facility] 9/17/2021 10:30 AM Saintclair Res, PT [facility]   10/13/2021 10:00 AM Korin Adler MD [facility]   11/4/2021 10:15 AM MD Breanne Bennett

## 2021-09-08 ENCOUNTER — HOSPITAL ENCOUNTER (OUTPATIENT)
Dept: PHYSICAL THERAPY | Age: 26
Discharge: HOME | End: 2021-09-08
Payer: COMMERCIAL

## 2021-09-08 NOTE — PROGRESS NOTES
Maryann Venegas  : 1995  [place] [employer]  Secondary:  [address] at [facility]  [address]. [address], [address]  [phone]   [phone]      PHYSICAL THERAPY    Patient unable to attend appointment today, reports having a bad headache.     Clem Kilgore, PT

## 2021-09-15 ENCOUNTER — HOSPITAL ENCOUNTER (OUTPATIENT)
Dept: PHYSICAL THERAPY | Age: 26
Discharge: HOME | End: 2021-09-15
Payer: COMMERCIAL

## 2021-09-15 PROCEDURE — 97110 THERAPEUTIC EXERCISES: CPT

## 2021-09-15 PROCEDURE — 97530 THERAPEUTIC ACTIVITIES: CPT

## 2021-09-15 NOTE — PROGRESS NOTES
Edna Wolf  : 1995  Payor: Juanis Ip / [place] [employer] / Product Type: Workers Comp /  [facility] at [address] [place], [address] [address]  [phone]   [phone]                                                          Jeremy Ibarra MD      OUTPATIENT PHYSICAL THERAPY: Daily Treatment Note 9/15/2021 Visit Count:  22,  new order    ICD-10: Treatment Diagnosis:  Pain in right ankle and joints of right foot (M25.571)  Posterior tibial tendinitis, right leg (J27.321)  Encounter for other specified surgical aftercare (Z48.89)  Other abnormalities of gait and mobility (R26.89)     Precautions/Allergies:   Patient has no known allergies. Fall Risk Score: 3 (? 5 = High Risk)  MD Orders: Eval and Treat  MEDICAL/REFERRING DIAGNOSIS:  Posterior tibial tendinitis, right leg [M76.821]   DATE OF ONSET: Patient fell at work in early , current right surgery for Posterior Tibial tendonitis 2021  REFERRING PHYSICIAN: Jeremy Ibarra MD  [address] [place] [address] 2021     PROCEDURE: 1.  Right medial displacement calcaneal osteotomy                             2.  Right flexor digitorum longus tendon transfer                             3.  Right spring ligament reconstruction of the ankle                             4.  Right posterior tibial tendon debridement with advancement of tendon                             9.  BONUP gastrocnemius recession         Pre-treatment Symptoms/Complaints: Patient reports doing okay. Felt fine after last session. States she can only stand for ~1.5 hrs before it starts bothering her. Pain: Initial:0/10  Medications Last Reviewed:  9/15/2021     Post Session: 0/10   Updated Objective Findings: See Recertification        TREATMENT:   THERAPEUTIC EXERCISE: (34 minutes):  Exercises per grid below to improve mobility, strength and balance.   Required minimal visual, verbal and manual cues to promote proper body alignment and promote proper body posture. Progressed resistance and complexity of movement as indicated. Date:  8/9/2021 Date:  8/16/2021 Date:  8/18/2021 Date:  8/23/2021 Date:  8/25/2021 Date:  9/2/2021 Date:  9/15/21   Activity/Exercise          Education          Nu Step 8min Level 5 hills 8min Level 5 hills 8min Level 6 8min Level 6 8min Level 6 8min Level 7 hills 8min Level 6   Gastroc Sol stretch 7f53fcx each 1o47sik each 0b40fri each 9x46aqy each 3t72ute each 1e48tmk each 1b53fvo each   Squat          Gait 5 min  5 min       3 way hip swing           Calf raise Right 3x8 w/ UE assist no brace Right 3x8 w/ UE assist no brace Right 3x10 w/ UE assist no brace Right 3x10 w/ UE assist no brace      Eccentric calf 20x right 2 up 1 down  No brace 20x right 2 up 1 down  No brace 20x right 2 up 1 down  No brace 15x right 2 up 1 down  No brace slower 5sec 15x right 2 up 1 down  No brace slower 5sec 20x right 2 up 1 down  No brace slower 5sec 20x right 2 up 1 down  No brace slower 5sec   Soleus Raise    3x10 20#      SL Balance 1d30yrp pad no brace 9q70xyr pad no brace Bosu 2h26nwp Bosu 1f69izm      Ankle Eversion          Shuttle press     3x10 100# bilat  2x10  50#  R/L 3x10 125# bilat  3x10  62.5#  R/L 3x10 125# bilat  3x10  75#  R/L   Shuttle Calf raise     2x10  50# right 3x10 62.5# 3x10 62.5#, Right   SAÚL          Walking Lunge   3x20' 2 5#       Toe / Heel walk   3x20'       Step up Bosu   20x right 30x right      Ball Toss          SL RDL      3# 2x10 3x10x5 lbs         THERAPEUTIC ACTIVITY: ( 15 minutes): Activities per gid below to improve functional movement related mobility, strength and balance to improve neuro-muscular carryover to daily functional activities for improving patient's quality of life. Required visual, verbal and manual cues to promote proper body alignment and promote proper body posture/mechanics.  Progressed resistance and complexity of movement as indicated. Date  8/9/2021 Date  8/16/2021 Date  8/18/2021 Date  8/23/2021 Date  8/25/2021 Date  9/2/2021 Date:  9/15/21   Activity/Exercise          Lateral walk 4x20' yellow forefoot no brace 4x20' yellow forefoot no brace 4x20' purple in brace 6x20' yellow forefoot no brac 4x20' purple @ knees w/ squat 4x20' purple @ knees w/ squat 4x20' purple @ knees w/ squat   Monster walk   4x20' purple in brace       Box Carry     20# 10x 30' low/bench 30# 10x 30' low/bench 30# 5x 30' low/bench   Farmer carry 4 laps 2 20# 4 laps 2 20# 4 laps 2 20# w/wrist strap 5 laps 2 20# w/wrist strap      Cable pull walk  Back 37# 10x  lateral 10x each 27# Back 40# 10x  lateral 10x each 27# Back 43# 10x  lateral 10x each 30# Back 43# 10x  lateral 10x each 30#                                                                 MANUAL THERAPY: (0 minutes): Joint mobilization, Soft tissue mobilization was utilized and necessary because of the patient's restricted joint motion and restricted motion of soft tissue mobility. Date  9/15/2021    Technique Used Grade  Level # Time(s) Effect while being performed                                      Soft tissue mobilization  Right ankle 0min                        HEP Log Date 1.    9/15/2021   2.  9/15/2021   3. 9/15/2021   4.    5.           [facility] Portal  Treatment/Session Summary:    Response to Treatment: Continued previous exercises with progressions as noted. Pt tolerated session well with some increased soreness by the end of the session. Communication/Consultation:  Progression of stability exercises   Equipment provided today: Not today   Recommendations/Intent for next treatment session:   Next visit will focus on Manual Therapy Core Stability Pain Science Education Quad strengthening Hip strengthening soft tissue mobilization Gait. Treatment Plan of Care Effective Dates: 8/25/2021 TO 10/25/2021 (90 days).   Frequency/Duration: 2 times a week for 90 Days             Total Treatment Billable Duration:   49 min  Rx   PT Patient Time In/Time Out  Time In: 4010  Time Out: [address], PT    Future Appointments   Date Time Provider Shukri Perez   9/23/2021  1:45 PM Jorge L Meade, PT [facility]   10/13/2021 10:00 AM Leon Ulrich MD [facility]   11/4/2021 10:15 AM Claudene Hashimoto, MD Eve Pearl

## 2021-09-17 ENCOUNTER — APPOINTMENT (OUTPATIENT)
Dept: PHYSICAL THERAPY | Age: 26
End: 2021-09-17
Payer: COMMERCIAL

## 2021-09-23 ENCOUNTER — HOSPITAL ENCOUNTER (OUTPATIENT)
Dept: PHYSICAL THERAPY | Age: 26
Discharge: HOME | End: 2021-09-23
Payer: COMMERCIAL

## 2021-09-23 PROCEDURE — 97110 THERAPEUTIC EXERCISES: CPT

## 2021-09-23 NOTE — PROGRESS NOTES
Tomy Giron  : 1995  Payor: Keenan Pandey / [place]: [place] [employer] / Product Type: Workers Comp /  Rejohn Rao at [address] [place], [address] [place] [address] Road  [phone]   [phone]                                                          Katharina Essex, MD      OUTPATIENT PHYSICAL THERAPY: Daily Treatment Note 2021 Visit Count:  22,  new order    ICD-10: Treatment Diagnosis:  Pain in right ankle and joints of right foot (M25.571)  Posterior tibial tendinitis, right leg (M30.257)  Encounter for other specified surgical aftercare (Z48.89)  Other abnormalities of gait and mobility (R26.89)     Precautions/Allergies:   Patient has no known allergies. Fall Risk Score: 3 (? 5 = High Risk)  MD Orders: Eval and Treat  MEDICAL/REFERRING DIAGNOSIS:  Posterior tibial tendinitis, right leg [M76.821]   DATE OF ONSET: Patient fell at work in early , current right surgery for Posterior Tibial tendonitis 2021  REFERRING PHYSICIAN: Katharina Essex, MD  [address] [place] [address] 2021     PROCEDURE: 1.  Right medial displacement calcaneal osteotomy                             2.  Right flexor digitorum longus tendon transfer                             3.  Right spring ligament reconstruction of the ankle                             4.  Right posterior tibial tendon debridement with advancement of tendon                             7.  NHDFP gastrocnemius recession         Pre-treatment Symptoms/Complaints: FAAM 79   Pain: Initial:0/10  Medications Last Reviewed:  2021     Post Session: 0/10   Updated Objective Findings: See discharge        TREATMENT:   THERAPEUTIC EXERCISE: (30 minutes):  Exercises per grid below to improve mobility, strength and balance. Required minimal visual, verbal and manual cues to promote proper body alignment and promote proper body posture.   Progressed resistance and complexity of movement as indicated. Date:  8/9/2021 Date:  8/16/2021 Date:  8/18/2021 Date:  8/23/2021 Date:  8/25/2021 Date:  9/2/2021 Date:  9/15/21 9/23/21   Activity/Exercise           Education        Discharge to [facility] program review   Nu Step 8min Level 5 hills 8min Level 5 hills 8min Level 6 8min Level 6 8min Level 6 8min Level 7 hills 8min Level 6    Gastroc Sol stretch 1l26ecd each 8r82alb each 2a93euw each 3a12pbf each 4t74nyf each 9k49qnu each 9z21lol each    Squat           Gait 5 min  5 min        3 way hip swing            Calf raise Right 3x8 w/ UE assist no brace Right 3x8 w/ UE assist no brace Right 3x10 w/ UE assist no brace Right 3x10 w/ UE assist no brace       Eccentric calf 20x right 2 up 1 down  No brace 20x right 2 up 1 down  No brace 20x right 2 up 1 down  No brace 15x right 2 up 1 down  No brace slower 5sec 15x right 2 up 1 down  No brace slower 5sec 20x right 2 up 1 down  No brace slower 5sec 20x right 2 up 1 down  No brace slower 5sec    Soleus Raise    3x10 20#       SL Balance 2b65dgp pad no brace 0d04irm pad no brace Bosu 6g61ktp Bosu 7g88sbg       Ankle Eversion           Shuttle press     3x10 100# bilat  2x10  50#  R/L 3x10 125# bilat  3x10  62.5#  R/L 3x10 125# bilat  3x10  75#  R/L    Shuttle Calf raise     2x10  50# right 3x10 62.5# 3x10 62.5#, Right    SAÚL           Walking Lunge   3x20' 2 5#        Toe / Heel walk   3x20'        Step up Bosu   20x right 30x right       Ball Toss           SL RDL      3# 2x10 3x10x5 lbs          THERAPEUTIC ACTIVITY: (  minutes): Activities per gid below to improve functional movement related mobility, strength and balance to improve neuro-muscular carryover to daily functional activities for improving patient's quality of life. Required visual, verbal and manual cues to promote proper body alignment and promote proper body posture/mechanics. Progressed resistance and complexity of movement as indicated.      Date  8/9/2021 Date  8/16/2021 Date  8/18/2021 Date  8/23/2021 Date  8/25/2021 Date  9/2/2021 Date:  9/15/21   Activity/Exercise          Lateral walk 4x20' yellow forefoot no brace 4x20' yellow forefoot no brace 4x20' purple in brace 6x20' yellow forefoot no brac 4x20' purple @ knees w/ squat 4x20' purple @ knees w/ squat 4x20' purple @ knees w/ squat   Monster walk   4x20' purple in brace       Box Carry     20# 10x 30' low/bench 30# 10x 30' low/bench 30# 5x 30' low/bench   Farmer carry 4 laps 2 20# 4 laps 2 20# 4 laps 2 20# w/wrist strap 5 laps 2 20# w/wrist strap      Cable pull walk  Back 37# 10x  lateral 10x each 27# Back 40# 10x  lateral 10x each 27# Back 43# 10x  lateral 10x each 30# Back 43# 10x  lateral 10x each 30#                                                                 MANUAL THERAPY: (0 minutes): Joint mobilization, Soft tissue mobilization was utilized and necessary because of the patient's restricted joint motion and restricted motion of soft tissue mobility. Date  9/23/2021    Technique Used Grade  Level # Time(s) Effect while being performed                                      Soft tissue mobilization  Right ankle 0min                        HEP Log Date 1.    9/23/2021   2.  9/23/2021   3. 9/23/2021   4.    5.           [facility] Portal  Treatment/Session Summary:    Response to Treatment: See discharge summary      Communication/Consultation:  independent home program   Equipment provided today: Not today   Recommendations/Intent for next treatment session:     Patient will see MD next week for follow up       Treatment Plan of Care Effective Dates: 8/25/2021 TO 10/25/2021 (90 days).   Frequency/Duration: 2 times a week for 90 Days             Total Treatment Billable Duration: 30 min plus evaluation  PT Patient Time In/Time Out  Time In: 1208  Time Out: 195 [facility], PT    Future Appointments   Date Time Provider Shukri Perez   10/13/2021 10:00 AM Rivas Ulrich MD [facility] 11/4/2021 10:15 AM MD Josefina Rogers

## 2021-09-23 NOTE — THERAPY RECERTIFICATION
Jozef Beach  : 1995      Payor: Joshua Marcos / Plan: [place] [employer] / Product Type: Workers Comp /  [id number] at [facility] [address] [place] [address], [facility], [address] [place] [address]  [phone]   [phone] -       OUTPATIENT PHYSICAL THERAPY:DISCHARGE: 2021    ICD-10: Treatment Diagnosis:  Pain in right ankle and joints of right foot (M25.571)  Posterior tibial tendinitis, right leg (F18.872)  Encounter for other specified surgical aftercare (Z48.89)  Other abnormalities of gait and mobility (R26.89)    Precautions/Allergies:   Patient has no known allergies. Fall Risk Score: 3 (? 5 = High Risk)  MD Orders: Eval and Treat  MEDICAL/REFERRING DIAGNOSIS:   Posterior tibial tendinitis of right lower extremity   DATE OF ONSET: Patient fell at work in early , current right surgery for Posterior Tibial tendonitis 2021  REFERRING PHYSICIAN: Ender Ledesma MD  Formerly [facility], [facility] 2021    PROCEDURE: 1. Right medial displacement calcaneal osteotomy                             2.  Right flexor digitorum longus tendon transfer                             3.  Right spring ligament reconstruction of the ankle                             4.  Right posterior tibial tendon debridement with advancement of tendon                             5.  Right gastrocnemius recession     INITIAL ASSESSMENT:   Ms. Reese Patel presents to physical therapy with decreased strength, ROM, joint mobility, functional mobility, and gait. These S/S are consistent with Posterior tibial tendinitis, right leg [M76.821]. Patient will benefit from skilled physical therapy for manual therapeutic techniques (as appropriate), therapeutic exercises and activities, neuromuscular re-education, balance training and comprehensive home exercises program to address current impairments and functional limitations.         DISCHARGE : 2021):  Patient has been seen for 22 sessions of physical therapy from 6/2/2021 to 9/23/21. She has been discharged having achieved majority of goals. TREATMENT PLAN:  Effective Dates: 8/25/2021 TO 10/25/2021 (60 days). Frequency/Duration: 2 times a week for 60 Day   Short-Term Goals~4 weeks  Goal Met   1. Stefanie Redmond will be compliant with home exercise program within 4 weeks in order to improve active participation with management of patient's symptoms and/or functional deficits. 1.  [x] Date: 7/12/2021   2. Stefanie Redmond will report <=3/10 pain with walking >15 minutes in order to participate in daily exercise and daily activities 2. [x] Date: 7/12/2021   3. Stefanie Redmond will be able to stand >60 minutes with <2/10 pain to feet in order to participate in household duties without issues/compromise. 3.  [x] Date: 8/25/2021   4. Stefanie Redmond will report being able to sleep at night without waking up secondary to foot pain. 4.  [x] Date: 8/25/2021   5. 5. Stefanie Redmond  will improve ankle dorsi flexion active ROM from 6 to 15 for normal gait  5. [x] Date: 7/12/2021   6. Stefanie Redmond  will improve ankle strength to 4+ to 5/5 to improve tolerance of ADLs. 6.  [x] Date: 9/23/2021              Long Term Goals~8 weeks Goal Met   1. Stefanie Redmond will be independent with home exercise program within 8 weeks in order to improve independence with management of patient's symptoms and/or functional deficits. 1.  [x] Date: 7/12/2021   2. Stefanie Redmond will report no pain with walking affecting participation in activities of daily living. 2.  [] Date:   3. Stefanie Redmond  will be able to stand >60 minutes without onset of foot pain. 3.  [x] Date: 8/25/2021   4. Stefanie Redmond will improve PT/OT FOOT AND ANKLE ABILITY MEASURE to 50/116 to demonstrate increased tolerance to activity. 4.  [x] Date: 7/12/2021   5.  Pt will report standing for > 60 minutes with minimal to no increase in pain in order to be able to stand for prolonged periods as needed to perform duties at work. 5.  [x] Date: 9/23/21   6. Jl Oneil will improve PT/OT FOOT AND ANKLE ABILITY MEASURE to 80/116 to demonstrate increased tolerance to activity. 6.  [x] Date: 9/23/21 79     Outcome Measure: Tool Used: PT/OT FOOT AND ANKLE ABILITY MEASURE  Score:  Initial: 5/116 Most Recent: 54 (Date: 8/25/2021) Discharge: 79/116   Interpretation of Score: For the \"Activities of Daily Living\", there are 21 questions each scored on a 5 point scale with 0 representing \"Unable to do\" and 4 representing \"No difficulty\". The lower the score, the greater the functional disability. 84/84 represents no disability. Minimal detectable change is 5.7 points. With the addition of the 8 questions in the \"Sports Subscale,\" there are 29 questions, each scored on a 5 point scale with 0 representing \"Unable to do\" and 4 representing \"No difficulty\". The lower the score, the greater the functional disability. 116/116 represents no disability. Minimal detectable change is 12.3 points.     Observation/Orthostatic Postural Assessment:         Gait:  Slight decrease SLS right LE    Standing: equal weightbearing     Palpation:  Mild Tenderness to medial and lateral right foot    Girth Measurement:   Joint: Eval Date: 6/2/2021  Re-Assess Date: 7/12/2021 Re-Assess Date: 8/25/2021    Ankle Figure 8 Measurement RIGHT LEFT RIGHT RIGHT     49 cm 48 cm 48 cm WNL         AROM/PROM        Joint: Eval Date: 6/2/2021  Re-Assess Date: 7/12/2021 Re-Assess Date:  8/25/2021 Re-assess Date:  9/23/21   Active ROM RIGHT LEFT RIGHT RIGHT RIGHT   Ankle Dorsiflexion 6 15 10 12 10   Ankle Plantarflexion 45 55 46 49 55   Ankle Inversion 23 35 27 30 38   Ankle Eversion 10 22 15 18 20                   Passive ROM        Ankle Dorsiflexion NT WNL 13 WNL    Ankle Plantarflexion NT WNL 48 WNL    Ankle Inversion NT WNL 29 WNL    Ankle Eversion NT WNL 17 WNL      Strength:     Eval Date:6/2/2021  Re-Assess Date: 7/12/2021 Re-Assess Date: 8/25/2021 Re-assess Date:  9/23/21     RIGHT LEFT RIGHT RIGHT    Ankle Dorsiflexion 4-/5 4+/5 4/5 4+/5 5-   Ankle Plantarflexion 3+/5 4+/5 4-/5 4/5 4/5   Ankle Inversion 3/5 4+/5 4-/5 4+/5 5-   Ankle Eversion 3+/5 4+/5 4-/5 4/5 4+   Knee Flexion 4/5 5/5 4+/5 4+/5 5   Knee Extension 4/5 5/5 4+/5 5/5 5             Joint Mobility Eval Date: 6/2/2021  Re-Assess Date: 7/12/2021 Re-Assess Date: 8/25/2021    RIGHT LEFT RIGHT RIGHT   Subtalar Hypomobile Normal Hypomobile Normal   Talocrual Hypomobile Normal  Hypomobile Normal                   Special Tests:   Not tested post surgical    Neurological Screen:  No radiating symptoms down leg    Functional Mobility:  Limited tolerance of walking and standing  Heel/Toe walking= 20'  Calf Raise (Dbl/Single)=  SL =20  right  Balance and Mobility:    Test Result   Timed up and Go 6.96   30 second Sit to Stand 26   Single Leg Balance Right: >30 seconds     Left:<45 seconds               Thank you for this referral,  Chas Ellington, PT

## 2022-03-19 PROBLEM — I49.3 SYMPTOMATIC PVCS: Status: ACTIVE | Noted: 2017-11-06

## 2022-03-19 PROBLEM — N87.1 MODERATE DYSPLASIA OF CERVIX (CIN II): Status: ACTIVE | Noted: 2021-06-03

## 2022-03-19 PROBLEM — R07.9 CHEST PAIN AT REST: Status: ACTIVE | Noted: 2018-05-04

## 2022-06-06 ENCOUNTER — HOSPITAL ENCOUNTER (EMERGENCY)
Age: 27
Discharge: HOME | End: 2022-06-06
Attending: EMERGENCY MEDICINE

## 2022-06-06 VITALS
SYSTOLIC BLOOD PRESSURE: 119 MMHG | OXYGEN SATURATION: 96 % | HEIGHT: 62 IN | DIASTOLIC BLOOD PRESSURE: 74 MMHG | WEIGHT: 160 LBS | TEMPERATURE: 99.5 F | HEART RATE: 84 BPM | RESPIRATION RATE: 16 BRPM | BODY MASS INDEX: 29.44 KG/M2

## 2022-06-06 DIAGNOSIS — N93.8 DYSFUNCTIONAL UTERINE BLEEDING: Primary | ICD-10-CM

## 2022-06-06 DIAGNOSIS — N30.00 ACUTE CYSTITIS WITHOUT HEMATURIA: ICD-10-CM

## 2022-06-06 DIAGNOSIS — E86.9 VOLUME DEPLETION: ICD-10-CM

## 2022-06-06 LAB
ALBUMIN SERPL-MCNC: 4.3 G/DL (ref 3.5–5)
ALBUMIN/GLOB SERPL: 1.7 {RATIO}
ALP SERPL-CCNC: 89 U/L (ref 45–117)
ALT SERPL-CCNC: 44 U/L (ref 13–61)
ANION GAP SERPL CALC-SCNC: 12 MMOL/L (ref 7–16)
APPEARANCE UR: ABNORMAL
AST SERPL-CCNC: 37 U/L (ref 15–37)
BACTERIA URNS QL MICRO: ABNORMAL /HPF
BASOPHILS # BLD: 0.1 K/UL (ref 0–0.2)
BASOPHILS NFR BLD: 1 % (ref 0–2)
BILIRUB SERPL-MCNC: 0.2 MG/DL (ref 0.2–1.1)
BILIRUB UR QL: NEGATIVE
BUN SERPL-MCNC: 15 MG/DL (ref 7–18)
CALCIUM SERPL-MCNC: 9.6 MG/DL (ref 8.3–10.4)
CASTS URNS QL MICRO: 0 /LPF
CHLORIDE SERPL-SCNC: 108 MMOL/L (ref 98–107)
CO2 SERPL-SCNC: 23 MMOL/L (ref 21–32)
COLOR UR: ABNORMAL
CREAT SERPL-MCNC: 1.48 MG/DL (ref 0.6–1)
CRYSTALS URNS QL MICRO: 0 /LPF
DIFFERENTIAL METHOD BLD: NORMAL
EOSINOPHIL # BLD: 0.1 K/UL (ref 0–0.8)
EOSINOPHIL NFR BLD: 1 % (ref 0.5–7.8)
EPI CELLS #/AREA URNS HPF: ABNORMAL /HPF
ERYTHROCYTE [DISTWIDTH] IN BLOOD BY AUTOMATED COUNT: 12.9 % (ref 11.9–14.6)
GLOBULIN SER CALC-MCNC: 2.6 G/DL (ref 2.3–3.5)
GLUCOSE SERPL-MCNC: 81 MG/DL (ref 65–100)
GLUCOSE UR STRIP.AUTO-MCNC: NEGATIVE MG/DL
HCG UR QL: NEGATIVE
HCT VFR BLD AUTO: 40.8 % (ref 35.8–46.3)
HGB BLD-MCNC: 14.1 G/DL (ref 11.7–15.4)
HGB UR QL STRIP: ABNORMAL
IMM GRANULOCYTES # BLD AUTO: 0 K/UL (ref 0–0.5)
IMM GRANULOCYTES NFR BLD AUTO: 0 % (ref 0–5)
KETONES UR QL STRIP.AUTO: NEGATIVE MG/DL
LEUKOCYTE ESTERASE UR QL STRIP.AUTO: ABNORMAL
LIPASE SERPL-CCNC: 28 U/L (ref 13–60)
LYMPHOCYTES # BLD: 3.8 K/UL (ref 0.5–4.6)
LYMPHOCYTES NFR BLD: 37 % (ref 13–44)
MCH RBC QN AUTO: 29.6 PG (ref 26.1–32.9)
MCHC RBC AUTO-ENTMCNC: 34.6 G/DL (ref 31.4–35)
MCV RBC AUTO: 85.7 FL (ref 79.6–97.8)
MONOCYTES # BLD: 0.8 K/UL (ref 0.1–1.3)
MONOCYTES NFR BLD: 7 % (ref 4–12)
MUCOUS THREADS URNS QL MICRO: 0 /LPF
NEUTS SEG # BLD: 5.6 K/UL (ref 1.7–8.2)
NEUTS SEG NFR BLD: 54 % (ref 43–78)
NITRITE UR QL STRIP.AUTO: NEGATIVE
NRBC # BLD: 0 K/UL (ref 0–0.2)
OTHER OBSERVATIONS: ABNORMAL
PH UR STRIP: 6 [PH] (ref 5–9)
PLATELET # BLD AUTO: 358 K/UL (ref 150–450)
PMV BLD AUTO: 9.9 FL (ref 9.4–12.3)
POTASSIUM SERPL-SCNC: 3.9 MMOL/L (ref 3.5–5.1)
PROT SERPL-MCNC: 6.9 G/DL (ref 6.4–8.2)
PROT UR STRIP-MCNC: 100 MG/DL
RBC # BLD AUTO: 4.76 M/UL (ref 4.05–5.2)
RBC #/AREA URNS HPF: >100 /HPF
SODIUM SERPL-SCNC: 143 MMOL/L (ref 136–145)
SP GR UR REFRACTOMETRY: 1.02 (ref 1–1.02)
UROBILINOGEN UR QL STRIP.AUTO: 0.2 EU/DL (ref 0.2–1)
WBC # BLD AUTO: 10.4 K/UL (ref 4.3–11.1)
WBC URNS QL MICRO: >100 /HPF

## 2022-06-06 PROCEDURE — 96374 THER/PROPH/DIAG INJ IV PUSH: CPT

## 2022-06-06 PROCEDURE — 6370000000 HC RX 637 (ALT 250 FOR IP): Performed by: EMERGENCY MEDICINE

## 2022-06-06 PROCEDURE — 85025 COMPLETE CBC W/AUTO DIFF WBC: CPT

## 2022-06-06 PROCEDURE — 81025 URINE PREGNANCY TEST: CPT

## 2022-06-06 PROCEDURE — 81003 URINALYSIS AUTO W/O SCOPE: CPT

## 2022-06-06 PROCEDURE — 99284 EMERGENCY DEPT VISIT MOD MDM: CPT

## 2022-06-06 PROCEDURE — 81015 MICROSCOPIC EXAM OF URINE: CPT

## 2022-06-06 PROCEDURE — 80053 COMPREHEN METABOLIC PANEL: CPT

## 2022-06-06 PROCEDURE — 6360000002 HC RX W HCPCS: Performed by: EMERGENCY MEDICINE

## 2022-06-06 PROCEDURE — 2580000003 HC RX 258: Performed by: EMERGENCY MEDICINE

## 2022-06-06 PROCEDURE — 83690 ASSAY OF LIPASE: CPT

## 2022-06-06 RX ORDER — CEPHALEXIN 500 MG/1
500 CAPSULE ORAL 3 TIMES DAILY
Qty: 21 CAPSULE | Refills: 0 | Status: SHIPPED | OUTPATIENT
Start: 2022-06-06 | End: 2022-06-13

## 2022-06-06 RX ORDER — CEPHALEXIN 500 MG/1
500 CAPSULE ORAL
Status: COMPLETED | OUTPATIENT
Start: 2022-06-06 | End: 2022-06-06

## 2022-06-06 RX ORDER — KETOROLAC TROMETHAMINE 30 MG/ML
30 INJECTION, SOLUTION INTRAMUSCULAR; INTRAVENOUS
Status: COMPLETED | OUTPATIENT
Start: 2022-06-06 | End: 2022-06-06

## 2022-06-06 RX ORDER — MEDROXYPROGESTERONE ACETATE 2.5 MG/1
10 TABLET ORAL DAILY
Qty: 20 TABLET | Refills: 0 | Status: SHIPPED | OUTPATIENT
Start: 2022-06-06 | End: 2022-08-25 | Stop reason: ALTCHOICE

## 2022-06-06 RX ORDER — MEDROXYPROGESTERONE ACETATE 10 MG/1
10 TABLET ORAL ONCE
Status: DISCONTINUED | OUTPATIENT
Start: 2022-06-06 | End: 2022-06-06 | Stop reason: RX

## 2022-06-06 RX ORDER — 0.9 % SODIUM CHLORIDE 0.9 %
1000 INTRAVENOUS SOLUTION INTRAVENOUS ONCE
Status: COMPLETED | OUTPATIENT
Start: 2022-06-06 | End: 2022-06-06

## 2022-06-06 RX ORDER — SODIUM CHLORIDE 0.9 % (FLUSH) 0.9 %
3 SYRINGE (ML) INJECTION EVERY 8 HOURS
Status: DISCONTINUED | OUTPATIENT
Start: 2022-06-06 | End: 2022-06-07 | Stop reason: HOSPADM

## 2022-06-06 RX ORDER — NAPROXEN 500 MG/1
500 TABLET ORAL 2 TIMES DAILY
Qty: 60 TABLET | Refills: 0 | Status: SHIPPED | OUTPATIENT
Start: 2022-06-06 | End: 2022-08-25 | Stop reason: ALTCHOICE

## 2022-06-06 RX ADMIN — KETOROLAC TROMETHAMINE 30 MG: 30 INJECTION, SOLUTION INTRAMUSCULAR; INTRAVENOUS at 22:13

## 2022-06-06 RX ADMIN — SODIUM CHLORIDE 1000 ML: 9 INJECTION, SOLUTION INTRAVENOUS at 22:13

## 2022-06-06 RX ADMIN — CEPHALEXIN 500 MG: 500 CAPSULE ORAL at 22:13

## 2022-06-06 ASSESSMENT — PAIN SCALES - GENERAL
PAINLEVEL_OUTOF10: 5
PAINLEVEL_OUTOF10: 6
PAINLEVEL_OUTOF10: 5
PAINLEVEL_OUTOF10: 5

## 2022-06-06 ASSESSMENT — ENCOUNTER SYMPTOMS
NAUSEA: 0
ABDOMINAL PAIN: 1
VOMITING: 0
RESPIRATORY NEGATIVE: 1

## 2022-06-06 ASSESSMENT — PAIN - FUNCTIONAL ASSESSMENT
PAIN_FUNCTIONAL_ASSESSMENT: 0-10
PAIN_FUNCTIONAL_ASSESSMENT: 0-10

## 2022-06-06 ASSESSMENT — PAIN DESCRIPTION - LOCATION
LOCATION: ABDOMEN
LOCATION: ABDOMEN

## 2022-06-06 NOTE — ED TRIAGE NOTES
Patient ambulatory to triage. Patient states vaginal bleeding and lower  abdominal pain for 3 weeks. States some nausea and diarrhea, but denies fever and vomiting.  Patient states taking Advil for abdominal pain, last taken at 1600

## 2022-06-07 NOTE — ED NOTES
I have reviewed discharge instructions with the patient. The patient verbalized understanding. Patient left ED via Discharge Method: ambulatory to Home with self. Opportunity for questions and clarification provided. Patient given 3 scripts. To continue your aftercare when you leave the hospital, you may receive an automated call from our care team to check in on how you are doing. This is a free service and part of our promise to provide the best care and service to meet your aftercare needs.  If you have questions, or wish to unsubscribe from this service please call [phone]. Thank you for Choosing our [facility] Emergency Department.         Patrizia Mcbride RN  06/06/22 7657

## 2022-06-07 NOTE — ED PROVIDER NOTES
[facility] Emergency Department Provider Note                   PCP:                None Provider               Age: 32 y.o. Sex: female       ICD-10-CM    1. Dysfunctional uterine bleeding  N93.8    2. Acute cystitis without hematuria  N30.00    3. Volume depletion  E86.9        DISPOSITION         New Prescriptions    CEPHALEXIN (KEFLEX) 500 MG CAPSULE    Take 1 capsule by mouth 3 times daily for 7 days    MEDROXYPROGESTERONE (PROVERA) 2.5 MG TABLET    Take 4 tablets by mouth daily for 5 days    NAPROXEN (NAPROSYN) 500 MG TABLET    Take 1 tablet by mouth 2 times daily       Orders Placed This Encounter   Procedures    Wet Prep    CBC with Diff    CMP    Lipase    Urinalysis w rflx microscopic    Pregnancy, Urine    Urinalysis, Micro    Diet NPO    PELVIC EXAM SET UP    Saline lock IV        MDM  Number of Diagnoses or Management Options  Diagnosis management comments: 26-year-old -American female presented to emergency department with lower abdominal pain and vaginal bleeding that has been ongoing for the past 3 weeks. Patient labs are reassuring. Her creatinine is mildly elevated. Patient does have a urinary tract infection. Patient received IV fluids and Keflex for UTI. Patient will be discharged home with follow-up with her OB/GYN. She will return the emergency department for any concerns.        Amount and/or Complexity of Data Reviewed  Clinical lab tests: ordered and reviewed  Decide to obtain previous medical records or to obtain history from someone other than the patient: yes  Review and summarize past medical records: yes    Patient Progress  Patient progress: improved       Yuliana Tyler is a 32 y.o. female who presents to the Emergency Department with chief complaint of    Chief Complaint   Patient presents with    Abdominal Pain    Vaginal Bleeding      26-year-old -American female presented to the emergency department with complaints of lower abdominal pain and vaginal bleeding that been ongoing but intermittent for the past 3 weeks. Patient states that she had similar symptoms with heavy vaginal bleeding few years ago when she missed her doses of Depo-Provera. Patient states that she did have her Depo shot approximately 6 weeks ago. Patient denies any lightheadedness or dizziness. She denies any fever, chills, nausea, vomiting, diarrhea, changes in bowel habits. She does have some mild discomfort with urination. Patient denies any flank pain. Patient follows with Dr. Milton Romo for OB/GYN. She denies any chance of being pregnant, STD exposures, vaginal discharge or any other concerns. The history is provided by the patient. All other systems reviewed and are negative. Review of Systems   Constitutional: Negative. HENT: Negative. Respiratory: Negative. Cardiovascular: Negative. Gastrointestinal: Positive for abdominal pain. Negative for nausea and vomiting. Genitourinary: Positive for pelvic pain and vaginal bleeding. Negative for vaginal discharge and vaginal pain. Skin: Negative. Neurological: Negative. Psychiatric/Behavioral: Negative. All other systems reviewed and are negative.       Past Medical History:   Diagnosis Date    Abnormal Pap smear of cervix     Chest pain     COVID-19 04/19/2021    not hospitalized; no respiratory symptoms    Fracture of left foot 05/23/2019    GERD (gastroesophageal reflux disease)     omeprazole daily, 2 pillows -     History of trichomonal vaginitis 09/24/2018    Posterior tibial tendonitis     right leg    Psychiatric disorder     panic attacks; resolved    PVC (premature ventricular contraction)         Past Surgical History:   Procedure Laterality Date    ORTHOPEDIC SURGERY Left 02/2020    LEFT FOOT [facility] PROCEDURE     ORTHOPEDIC SURGERY Right 04/2021    right spring ligament reconstruction    ORTHOPEDIC SURGERY Right 10/2020    Right foot kidner procedure and sprained ligament repair    WISDOM TOOTH EXTRACTION      WISDOM TOOTH EXTRACTION          Family History   Problem Relation Age of Onset    No Known Problems Mother     No Known Problems Father     No Known Problems Sister     No Known Problems Brother     Hypertension Maternal Grandmother     Thyroid Disease Maternal Grandmother     Heart Surgery Maternal Grandmother     No Known Problems Maternal Grandfather     No Known Problems Paternal Grandmother     No Known Problems Paternal Grandfather     Diabetes Maternal Aunt     Diabetes Maternal Aunt            Social Connections:     Frequency of Communication with Friends and Family: Not on file    Frequency of Social Gatherings with Friends and Family: Not on file    Attends Congregation Services: Not on file    Active Member of Clubs or Organizations: Not on file    Attends Club or Organization Meetings: Not on file    Marital Status: Not on file        No Known Allergies     Vitals signs and nursing note reviewed. Patient Vitals for the past 4 hrs:   Temp Pulse Resp BP SpO2   06/06/22 2215 -- 84 16 128/64 --   06/06/22 1946 99.5 °F (37.5 °C) 90 19 136/66 100 %          Physical Exam  Vitals and nursing note reviewed. Constitutional:       General: She is not in acute distress. Appearance: She is well-developed. She is not ill-appearing or toxic-appearing. HENT:      Head: Normocephalic and atraumatic. Mouth/Throat:      Mouth: Mucous membranes are moist.   Eyes:      Extraocular Movements: Extraocular movements intact. Pupils: Pupils are equal, round, and reactive to light. Comments: Normal conjunctiva   Cardiovascular:      Rate and Rhythm: Normal rate and regular rhythm. Heart sounds: Normal heart sounds. Pulmonary:      Effort: Pulmonary effort is normal.      Breath sounds: Normal breath sounds. Abdominal:      General: Bowel sounds are normal.      Palpations: Abdomen is soft. Tenderness: There is no abdominal tenderness. There is no guarding or rebound. Skin:     General: Skin is warm and dry. Neurological:      General: No focal deficit present. Mental Status: She is alert and oriented to person, place, and time. Psychiatric:         Mood and Affect: Mood normal.         Behavior: Behavior normal.              Labs Reviewed   COMPREHENSIVE METABOLIC PANEL - Abnormal; Notable for the following components:       Result Value    Chloride 108 (*)     CREATININE 1.48 (*)     GFR  54 (*)     GFR Non- 45 (*)     All other components within normal limits   URINALYSIS - Abnormal; Notable for the following components:    Protein,  (*)     Blood, Urine LARGE (*)     Leukocyte Esterase, Urine SMALL (*)     All other components within normal limits   URINALYSIS, MICRO - Abnormal; Notable for the following components:    BACTERIA, URINE 3+ (*)     All other components within normal limits   WET PREP, GENITAL   CBC WITH AUTO DIFFERENTIAL   LIPASE   PREGNANCY, URINE        No orders to display                      ED Course as of 06/06/22 2230 Mon Jun 06, 2022 2225 Patient is feeling better. I ordered Provera for patient secondary to her bleeding however Provera is not available here. It would need to be occurred from other hospital.  Discussed with patient and she will be given a prescription for this instead. She will follow-up with her OB/GYN. She will return the emergency department for any concerns. [JL]      ED Course User Index  [JL] Bebeto Quiñones MD        Voice dictation software was used during the making of this note. This software is not perfect and grammatical and other typographical errors may be present. This note has not been completely proofread for errors.        Bebeto Quiñones MD  06/06/22 2230

## 2022-07-03 ENCOUNTER — HOSPITAL ENCOUNTER (EMERGENCY)
Age: 27
Discharge: HOME | End: 2022-07-03
Attending: EMERGENCY MEDICINE

## 2022-07-03 VITALS
BODY MASS INDEX: 30.36 KG/M2 | WEIGHT: 165 LBS | SYSTOLIC BLOOD PRESSURE: 109 MMHG | DIASTOLIC BLOOD PRESSURE: 69 MMHG | TEMPERATURE: 98.4 F | OXYGEN SATURATION: 100 % | HEART RATE: 81 BPM | HEIGHT: 62 IN | RESPIRATION RATE: 18 BRPM

## 2022-07-03 DIAGNOSIS — R10.2 PELVIC PAIN: Primary | ICD-10-CM

## 2022-07-03 DIAGNOSIS — B96.89 BACTERIAL VAGINOSIS: ICD-10-CM

## 2022-07-03 DIAGNOSIS — N76.0 BACTERIAL VAGINOSIS: ICD-10-CM

## 2022-07-03 LAB
APPEARANCE UR: CLEAR
BACTERIA URNS QL MICRO: 0 /HPF
BILIRUB UR QL: NEGATIVE
CASTS URNS QL MICRO: 0 /LPF
COLOR UR: YELLOW
CRYSTALS URNS QL MICRO: 0 /LPF
EPI CELLS #/AREA URNS HPF: NORMAL /HPF
GLUCOSE UR STRIP.AUTO-MCNC: NEGATIVE MG/DL
HCG UR QL: NEGATIVE
HGB UR QL STRIP: ABNORMAL
KETONES UR QL STRIP.AUTO: NEGATIVE MG/DL
LEUKOCYTE ESTERASE UR QL STRIP.AUTO: NEGATIVE
MUCOUS THREADS URNS QL MICRO: 0 /LPF
NITRITE UR QL STRIP.AUTO: NEGATIVE
OTHER OBSERVATIONS: NORMAL
PH UR STRIP: 7 [PH] (ref 5–9)
PROT UR STRIP-MCNC: NEGATIVE MG/DL
RBC #/AREA URNS HPF: NORMAL /HPF
SERVICE CMNT-IMP: NORMAL
SP GR UR REFRACTOMETRY: 1.02 (ref 1–1.02)
UROBILINOGEN UR QL STRIP.AUTO: 0.2 EU/DL (ref 0.2–1)
WBC URNS QL MICRO: NORMAL /HPF
WET PREP GENITAL: NORMAL

## 2022-07-03 PROCEDURE — 87210 SMEAR WET MOUNT SALINE/INK: CPT

## 2022-07-03 PROCEDURE — 99283 EMERGENCY DEPT VISIT LOW MDM: CPT

## 2022-07-03 PROCEDURE — 81025 URINE PREGNANCY TEST: CPT

## 2022-07-03 PROCEDURE — 87491 CHLMYD TRACH DNA AMP PROBE: CPT

## 2022-07-03 PROCEDURE — 81015 MICROSCOPIC EXAM OF URINE: CPT

## 2022-07-03 PROCEDURE — 81001 URINALYSIS AUTO W/SCOPE: CPT

## 2022-07-03 RX ORDER — METRONIDAZOLE 500 MG/1
500 TABLET ORAL 2 TIMES DAILY
Qty: 14 TABLET | Refills: 0 | Status: SHIPPED | OUTPATIENT
Start: 2022-07-03 | End: 2022-07-10

## 2022-07-03 ASSESSMENT — PAIN - FUNCTIONAL ASSESSMENT
PAIN_FUNCTIONAL_ASSESSMENT: 0-10
PAIN_FUNCTIONAL_ASSESSMENT: 0-10

## 2022-07-03 ASSESSMENT — PAIN DESCRIPTION - PAIN TYPE: TYPE: ACUTE PAIN

## 2022-07-03 ASSESSMENT — PAIN SCALES - GENERAL
PAINLEVEL_OUTOF10: 8
PAINLEVEL_OUTOF10: 8
PAINLEVEL_OUTOF10: 3

## 2022-07-03 ASSESSMENT — PAIN DESCRIPTION - LOCATION
LOCATION: PELVIS

## 2022-07-03 ASSESSMENT — PAIN DESCRIPTION - DESCRIPTORS
DESCRIPTORS: SHARP
DESCRIPTORS: SHARP

## 2022-07-03 ASSESSMENT — PAIN DESCRIPTION - FREQUENCY: FREQUENCY: CONTINUOUS

## 2022-07-03 ASSESSMENT — ENCOUNTER SYMPTOMS
SHORTNESS OF BREATH: 0
ABDOMINAL PAIN: 0

## 2022-07-03 ASSESSMENT — PAIN DESCRIPTION - ORIENTATION: ORIENTATION: LEFT

## 2022-07-03 NOTE — ED NOTES
I have reviewed discharge instructions with the patient. The patient verbalized understanding. Patient left ED via Discharge Method: ambulatory to Home with self. Opportunity for questions and clarification provided. Patient given 1 scripts. To continue your aftercare when you leave the hospital, you may receive an automated call from our care team to check in on how you are doing. This is a free service and part of our promise to provide the best care and service to meet your aftercare needs.  If you have questions, or wish to unsubscribe from this service please call [phone]. Thank you for Choosing our [facility] Emergency Department.         Ada Norman RN  07/03/22 5518

## 2022-07-03 NOTE — ED TRIAGE NOTES
Pt presents to the ED with c/o pelvic pain. Pt states she she was seen in this ED about a month ago d/t severe vaginal bleeding and pain. Pt states the bleeding and pain went away, but now just the pain has returned in the last week or so. Pt states today the pain is more severe. Pt has been taking naproxen at home, but it is not helping. Denies bleeding, denies discharge, denies fever. Endorses vomiting one time before coming in today.

## 2022-07-03 NOTE — ED PROVIDER NOTES
[employer] Emergency Department Provider Note                   PCP:                None Provider               Age: 32 y.o. Sex: female       ICD-10-CM    1. Pelvic pain  R10.2    2. Bacterial vaginosis  N76.0     B96.89        DISPOSITION Decision To Discharge 07/03/2022 04:51:01 PM       Discharge Medication List as of 7/3/2022  4:55 PM      START taking these medications    Details   metroNIDAZOLE (FLAGYL) 500 MG tablet Take 1 tablet by mouth 2 times daily for 7 days, Disp-14 tablet, R-0Normal             Orders Placed This Encounter   Procedures    Wet prep, genital    C.trachomatis N.gonorrhoeae DNA    Urinalysis w rflx microscopic    Pregnancy, Urine    Urinalysis, Micro    PELVIC EXAM SET UP        BARTOLO Almaraz CNP 5:40 PM      MDM  Number of Diagnoses or Management Options  Bacterial vaginosis: new, needed workup  Pelvic pain: new, needed workup  Diagnosis management comments: Otherwise healthy 70-year-old female who presents emergency department today with complaint of pelvic pain. Patient appears in no acute distress today. She is afebrile. Urine is negative for indication of infection or pregnancy. Chaperone present for pelvic exam.  Scant amount of white discharge noted in vaginal vault. No cervical motion or adnexal tenderness. Clue cells present on wet prep. Will treat with Flagyl. Patient does not have concern for other STDs today and declines treatment for gonorrhea or chlamydia coverage. She is seeing her gynecologist on Wednesday. I have discussed the results of all labs, procedures, radiographs, and/or treatments with the patient and available family members. Treatment plan is agreed upon by the patient and the patient is ready for discharge. Questions about treatment in the ED and differential diagnosis of presenting condition were answered.   Patient was given verbal discharge instructions including, but not limited to, importance of returning to the emergency department for any concern of worsening or continued symptoms. Instructions were given to follow-up with a primary care provider or specialist within 1 to 2 days. Adverse effects of medications, if prescribed, were discussed and the patient was advised to refrain from significant physical activity until followed up by a primary care physician and to not drive or operate heavy machinery after taking any sedating substances. Risk of Complications, Morbidity, and/or Mortality  Presenting problems: low  Diagnostic procedures: low  Management options: low    Patient Progress  Patient progress: keya Sanchez is a 32 y.o. female who presents to the Emergency Department with chief complaint of    Chief Complaint   Patient presents with    Pelvic Pain      Otherwise healthy 80-year-old female who presents emergency department today with complaint of pelvic pain. Patient states the pain began about 1 week ago. She denies any vaginal discharge, vaginal bleeding, nausea, vomiting, diarrhea, dysuria, hematuria, fever, chills, chest pain, abdominal pain, or shortness of breath. She denies any concern for pregnancy or STDs. The pain goes all the way across her abdomen. She denies any aggravating or relieving factors. The history is provided by the patient. Review of Systems   Constitutional: Negative for fever. Respiratory: Negative for shortness of breath. Cardiovascular: Negative for chest pain. Gastrointestinal: Negative for abdominal pain. Genitourinary: Positive for pelvic pain. Negative for dysuria, vaginal bleeding, vaginal discharge and vaginal pain. All other systems reviewed and are negative.       Past Medical History:   Diagnosis Date    Abnormal Pap smear of cervix     Chest pain     COVID-19 04/19/2021    not hospitalized; no respiratory symptoms    Fracture of left foot 05/23/2019    GERD (gastroesophageal reflux disease)     omeprazole daily, 2 pillows -     History of trichomonal vaginitis 09/24/2018    Posterior tibial tendonitis     right leg    Psychiatric disorder     panic attacks; resolved    PVC (premature ventricular contraction)         Past Surgical History:   Procedure Laterality Date    ORTHOPEDIC SURGERY Left 02/2020    LEFT FOOT [facility] PROCEDURE     ORTHOPEDIC SURGERY Right 04/2021    right spring ligament reconstruction    ORTHOPEDIC SURGERY Right 10/2020    Right foot kidner procedure and sprained ligament repair    WISDOM TOOTH EXTRACTION      WISDOM TOOTH EXTRACTION          Family History   Problem Relation Age of Onset    No Known Problems Mother     No Known Problems Father     No Known Problems Sister     No Known Problems Brother     Hypertension Maternal Grandmother     Thyroid Disease Maternal Grandmother     Heart Surgery Maternal Grandmother     No Known Problems Maternal Grandfather     No Known Problems Paternal Grandmother     No Known Problems Paternal Grandfather     Diabetes Maternal Aunt     Diabetes Maternal Aunt            Social Connections:     Frequency of Communication with Friends and Family: Not on file    Frequency of Social Gatherings with Friends and Family: Not on file    Attends Congregational Services: Not on file    Active Member of Clubs or Organizations: Not on file    Attends Club or Organization Meetings: Not on file    Marital Status: Not on file        No Known Allergies     Vitals signs and nursing note reviewed. Patient Vitals for the past 4 hrs:   Temp Pulse Resp BP SpO2   07/03/22 1713 98.4 °F (36.9 °C) 81 18 109/69 100 %   07/03/22 1503 99.5 °F (37.5 °C) 73 18 129/69 100 %          Physical Exam  Vitals and nursing note reviewed. Exam conducted with a chaperone present (Wendie SANTAMARIA). Constitutional:       General: She is not in acute distress. Appearance: Normal appearance. She is well-developed. She is not ill-appearing, toxic-appearing or diaphoretic.    HENT:      Head: Normocephalic and atraumatic. Mouth/Throat:      Mouth: Mucous membranes are moist.   Eyes:      General: No scleral icterus. Extraocular Movements: Extraocular movements intact. Cardiovascular:      Rate and Rhythm: Normal rate. Pulmonary:      Effort: Pulmonary effort is normal. No respiratory distress. Abdominal:      General: Abdomen is flat. There is no distension. Genitourinary:     General: Normal vulva. Vagina: Vaginal discharge present. Cervix: Friability and cervical bleeding present. No cervical motion tenderness. Uterus: Normal.       Adnexa: Right adnexa normal and left adnexa normal.        Right: No tenderness. Left: No tenderness. Comments: Scant amount of white discharge noted in vaginal vault. No cervical motion or adnexal tenderness. Skin:     General: Skin is warm and dry. Capillary Refill: Capillary refill takes less than 2 seconds. Neurological:      General: No focal deficit present. Mental Status: She is alert and oriented to person, place, and time. Psychiatric:         Mood and Affect: Mood normal.         Behavior: Behavior normal.          Procedures      Labs Reviewed   URINALYSIS - Abnormal; Notable for the following components:       Result Value    Blood, Urine Trace Intact (*)     All other components within normal limits   WET PREP, GENITAL   C.TRACHOMATIS N.GONORRHOEAE DNA   PREGNANCY, URINE   URINALYSIS, MICRO        No orders to display                          Voice dictation software was used during the making of this note. This software is not perfect and grammatical and other typographical errors may be present. This note has not been completely proofread for errors.        Bryant Briceno, BARTOLO - [facility] [facility]  07/03/22 7034

## 2022-07-03 NOTE — ED NOTES
I have reviewed discharge instructions with the patient. The patient verbalized understanding. Patient left ED via Discharge Method: ambulatory to Home with self. Opportunity for questions and clarification provided. Patient given 1 scripts. To continue your aftercare when you leave the hospital, you may receive an automated call from our care team to check in on how you are doing. This is a free service and part of our promise to provide the best care and service to meet your aftercare needs.  If you have questions, or wish to unsubscribe from this service please call [phone]. Thank you for Choosing our [facility] Emergency Department.           Eduardo Wakefield RN  07/03/22 6971

## 2022-07-07 LAB
C TRACH RRNA SPEC QL NAA+PROBE: NEGATIVE
N GONORRHOEA RRNA SPEC QL NAA+PROBE: NEGATIVE
SPECIMEN SOURCE: NORMAL

## 2022-07-25 NOTE — PROGRESS NOTES
Spiritual Care visit. Initial Visit, Pre Surgery Consult. Visit and prayer before patient goes to surgery.     Visit by Claire Lambert M.Ed., Th.B. ,Staff 
complains of pain/discomfort

## 2022-08-24 ENCOUNTER — NURSE TRIAGE (OUTPATIENT)
Dept: OTHER | Facility: CLINIC | Age: 27
End: 2022-08-24

## 2022-08-24 NOTE — TELEPHONE ENCOUNTER
Received call from Benji at [facility] with [employer] Complaint. Subjective: Caller states \"Has been having some anxiety attacks with some chest tightness. Was previously on medication, hasn't taken in years. Denies stressor. \"     Current Symptoms: anxiety/panic attacks, chest tightness with panic attacks, headache w/ pressure shooting through neck, denies suicidal ideation    Onset: 2 days ago; intermittent    Associated Symptoms: reduced activity    Pain Severity: 6/10; aching, pressure; constant    Temperature: Denies       What has been tried: take deep breaths, calm down    LMP: NA Pregnant: No    Recommended disposition: See PCP within 24 Hours    Care advice provided, patient verbalizes understanding; denies any other questions or concerns; instructed to call back for any new or worsening symptoms. Patient/caller agrees with recommended disposition. Writer left message with [facility] for return call to patient for scheduling      Attention Provider: Thank you for allowing me to participate in the care of your patient. The patient was connected to triage in response to information provided to the ECC/PSC. Please do not respond through this encounter as the response is not directed to a shared pool. Reason for Disposition   Symptoms interfere with work or school    Protocols used:  Anxiety and Panic Attack-ADULT-

## 2022-08-25 ENCOUNTER — OFFICE VISIT (OUTPATIENT)
Dept: FAMILY MEDICINE CLINIC | Facility: CLINIC | Age: 27
End: 2022-08-25
Payer: COMMERCIAL

## 2022-08-25 VITALS
HEIGHT: 62 IN | DIASTOLIC BLOOD PRESSURE: 84 MMHG | BODY MASS INDEX: 29.81 KG/M2 | SYSTOLIC BLOOD PRESSURE: 137 MMHG | WEIGHT: 162 LBS

## 2022-08-25 DIAGNOSIS — G44.209 TENSION HEADACHE: ICD-10-CM

## 2022-08-25 DIAGNOSIS — F41.1 GAD (GENERALIZED ANXIETY DISORDER): ICD-10-CM

## 2022-08-25 DIAGNOSIS — F41.1 GAD (GENERALIZED ANXIETY DISORDER): Primary | ICD-10-CM

## 2022-08-25 LAB — TSH, 3RD GENERATION: 0.65 UIU/ML (ref 0.36–3.74)

## 2022-08-25 PROCEDURE — 99214 OFFICE O/P EST MOD 30 MIN: CPT | Performed by: FAMILY MEDICINE

## 2022-08-25 RX ORDER — ESCITALOPRAM OXALATE 10 MG/1
10 TABLET ORAL DAILY
Qty: 90 TABLET | Refills: 1 | Status: SHIPPED | OUTPATIENT
Start: 2022-08-25 | End: 2022-09-22 | Stop reason: SDUPTHER

## 2022-08-25 RX ORDER — HYDROXYZINE HYDROCHLORIDE 25 MG/1
12.5-25 TABLET, FILM COATED ORAL NIGHTLY
Qty: 30 TABLET | Refills: 0 | Status: SHIPPED | OUTPATIENT
Start: 2022-08-25 | End: 2022-09-24

## 2022-08-25 ASSESSMENT — PATIENT HEALTH QUESTIONNAIRE - PHQ9
1. LITTLE INTEREST OR PLEASURE IN DOING THINGS: 0
SUM OF ALL RESPONSES TO PHQ QUESTIONS 1-9: 0
2. FEELING DOWN, DEPRESSED OR HOPELESS: 0
SUM OF ALL RESPONSES TO PHQ9 QUESTIONS 1 & 2: 0
SUM OF ALL RESPONSES TO PHQ QUESTIONS 1-9: 0

## 2022-08-25 ASSESSMENT — ENCOUNTER SYMPTOMS
BLOOD IN STOOL: 0
ABDOMINAL PAIN: 0
CHEST TIGHTNESS: 0
SHORTNESS OF BREATH: 0

## 2022-08-25 NOTE — PROGRESS NOTES
[place]  _______________________________________  Wing Hesselbach, MD Starla Burkitt, SHANNON Toledo MD Renee Dom, [address]  Phone: [phone]  Fax: [phone]    Liliana Flores (:  1995) is a 32 y.o. female,Established patient, here for evaluation of the following chief complaint(s):  Chest Pain (X 2 weeks ), Anxiety, and Headache         ASSESSMENT/PLAN:    1. JV (generalized anxiety disorder)  She has all the signs of untreated JV. Will start PRN hydroxyzine as well and check her thyroid. Follow closely, call with any issues. - escitalopram (LEXAPRO) 10 MG tablet; Take 1 tablet by mouth daily  Dispense: 90 tablet; Refill: 1  - hydrOXYzine HCl (ATARAX) 25 MG tablet; Take 0.5-1 tablets by mouth nightly  Dispense: 30 tablet; Refill: 0  - TSH; Future    2. Tension headache  Will likely get better with reduced anxiety. Asked her to use a heating back on the back of the neck when she gets home from work for a half hour as she works 8 hour shifts looking down at a table. Will recheck this in a month. FU 4w        Subjective   SUBJECTIVE/OBJECTIVE:    PT presents with above. Has had full cardiac workup in the last 4 years with a couple echos and a stress test. They were all normal.     She has been having a tight feeling in her chest when she is lying down or sitting down or doing nothing at all. Will feel like she needs to run away. Has had similar symptoms years ago. Was on PRN atarax for a time but this just mad her sleepy. Has never been on chronic daily medication for this. She had a panic attack yesterday at work and had to leave. 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 She drinks alcohol rarely. Not doing any recreational drugs or nicotine at all.      BP Readings from Last 3 Encounters:   22 137/84   22 109/69   22 119/74     Wt Readings from Last 3 Encounters:   08/25/22 162 lb (73.5 kg)   07/03/22 165 lb (74.8 kg)   06/06/22 160 lb (72.6 kg)     No results found for: TSHFT4, TSH, TSHREFLEX, YVA6YKE      Review of Systems   Constitutional:  Negative for chills and fever. Respiratory:  Negative for chest tightness and shortness of breath. Cardiovascular:  Negative for chest pain. Gastrointestinal:  Negative for abdominal pain and blood in stool. Genitourinary:  Negative for hematuria. Neurological:  Positive for headaches. Negative for syncope. Objective   Physical Exam  Vitals and nursing note reviewed. Constitutional:       Appearance: Normal appearance. HENT:      Head: Normocephalic and atraumatic. Right Ear: External ear normal.      Left Ear: External ear normal.      Mouth/Throat:      Mouth: Mucous membranes are moist.   Eyes:      General: No scleral icterus. Extraocular Movements: Extraocular movements intact. Pupils: Pupils are equal, round, and reactive to light. Cardiovascular:      Rate and Rhythm: Normal rate and regular rhythm. Pulses: Normal pulses. Heart sounds: No murmur heard. No friction rub. No gallop. Pulmonary:      Effort: Pulmonary effort is normal. No respiratory distress. Breath sounds: Normal breath sounds. No stridor. No wheezing. Abdominal:      General: Bowel sounds are normal. There is no distension. Tenderness: There is no abdominal tenderness. There is no right CVA tenderness or left CVA tenderness. Musculoskeletal:         General: Tenderness present. No swelling. Normal range of motion. Cervical back: Normal range of motion. No rigidity. Right lower leg: No edema. Left lower leg: No edema. Comments: Very tight in the BL shoulders and traps   Skin:     General: Skin is warm and dry. Capillary Refill: Capillary refill takes 2 to 3 seconds. Coloration: Skin is not pale.    Neurological:      General: No focal deficit present. Mental Status: She is alert and oriented to person, place, and time. Mental status is at baseline. Cranial Nerves: No cranial nerve deficit. Deep Tendon Reflexes: Reflexes normal.   Psychiatric:         Mood and Affect: Mood normal.         Behavior: Behavior normal.         Thought Content: Thought content normal.         Judgment: Judgment normal.                An electronic signature was used to authenticate this note.     --Paula Gandhi MD

## 2022-09-22 ENCOUNTER — OFFICE VISIT (OUTPATIENT)
Dept: FAMILY MEDICINE CLINIC | Facility: CLINIC | Age: 27
End: 2022-09-22
Payer: COMMERCIAL

## 2022-09-22 VITALS
BODY MASS INDEX: 29.81 KG/M2 | WEIGHT: 162 LBS | HEIGHT: 62 IN | SYSTOLIC BLOOD PRESSURE: 120 MMHG | DIASTOLIC BLOOD PRESSURE: 80 MMHG

## 2022-09-22 DIAGNOSIS — G43.009 MIGRAINE WITHOUT AURA AND WITHOUT STATUS MIGRAINOSUS, NOT INTRACTABLE: ICD-10-CM

## 2022-09-22 DIAGNOSIS — F41.1 GAD (GENERALIZED ANXIETY DISORDER): Primary | ICD-10-CM

## 2022-09-22 PROCEDURE — 99214 OFFICE O/P EST MOD 30 MIN: CPT | Performed by: FAMILY MEDICINE

## 2022-09-22 RX ORDER — ESCITALOPRAM OXALATE 10 MG/1
5 TABLET ORAL DAILY
Qty: 90 TABLET | Refills: 1 | Status: SHIPPED | OUTPATIENT
Start: 2022-09-22

## 2022-09-22 RX ORDER — PROPRANOLOL HCL 60 MG
60 CAPSULE, EXTENDED RELEASE 24HR ORAL DAILY
Qty: 90 CAPSULE | Refills: 1 | Status: SHIPPED | OUTPATIENT
Start: 2022-09-22 | End: 2022-09-22 | Stop reason: SDUPTHER

## 2022-09-22 RX ORDER — PROPRANOLOL HCL 60 MG
60 CAPSULE, EXTENDED RELEASE 24HR ORAL DAILY
Qty: 90 CAPSULE | Refills: 1 | Status: SHIPPED | OUTPATIENT
Start: 2022-09-22

## 2022-09-22 ASSESSMENT — PATIENT HEALTH QUESTIONNAIRE - PHQ9
SUM OF ALL RESPONSES TO PHQ QUESTIONS 1-9: 0
2. FEELING DOWN, DEPRESSED OR HOPELESS: 0
SUM OF ALL RESPONSES TO PHQ QUESTIONS 1-9: 0
SUM OF ALL RESPONSES TO PHQ9 QUESTIONS 1 & 2: 0
SUM OF ALL RESPONSES TO PHQ QUESTIONS 1-9: 0
SUM OF ALL RESPONSES TO PHQ QUESTIONS 1-9: 0
1. LITTLE INTEREST OR PLEASURE IN DOING THINGS: 0

## 2022-09-22 ASSESSMENT — ENCOUNTER SYMPTOMS
CHEST TIGHTNESS: 0
BLOOD IN STOOL: 0
SHORTNESS OF BREATH: 0
ABDOMINAL PAIN: 0

## 2022-09-22 NOTE — PROGRESS NOTES
[place]  _______________________________________  MD Mo Duque, DO  Alok Yi, MD Ha Reid MD    [id number] [address] , [address] [place] [address]  Phone: [phone]  Fax: [phone]    Doug Duke (:  1995) is a 32 y.o. female,Established patient, here for evaluation of the following chief complaint(s): Anxiety (Follow up )         ASSESSMENT/PLAN:    1. JV (generalized anxiety disorder)  She is feeling run down and flat. But still having some panic. Will cut the lexapro in half. Add Inderal LA 60 daily to help ppx against panic but also to hopefully ppx for her migraines. I let her know if she gets pregnant we would have to switch her meds around. - escitalopram (LEXAPRO) 10 MG tablet; Take 0.5 tablets by mouth daily  Dispense: 90 tablet; Refill: 1    2. Migraine without aura and without status migrainosus, not intractable  Add low dose BB. She will let me know if this makes her feel worse. FU 4w      Subjective   SUBJECTIVE/OBJECTIVE:    Here for 1m FU after starting lexapro 10 for anxiety after negative cardiac w/u and having panic attacks. Has had PRN atarax available as well. She states she is feeling a little better but feeling tired. Having panic attacks about once a week now. Will breathe through them as they aren't as bad as they used to be. She is sleeping more than previous. Her HA which seemed like a tension HA previously is now happening every two daysand will be periorbital, usually on the right side but sometimes on the left side. No aura preceding them.        BP Readings from Last 3 Encounters:   22 120/80   22 137/84   22 109/69     Wt Readings from Last 3 Encounters:   22 162 lb (73.5 kg)   22 162 lb (73.5 kg)   22 165 lb (74.8 kg)     Lab Results   Component Value Date    FIE0VEH 0.652 2022         Review of Systems   Constitutional: Positive for fatigue. Negative for chills and fever. Respiratory:  Negative for chest tightness and shortness of breath. Cardiovascular:  Negative for chest pain. Gastrointestinal:  Negative for abdominal pain and blood in stool. Genitourinary:  Negative for hematuria. Neurological:  Positive for headaches. Negative for syncope. Psychiatric/Behavioral:  Negative for agitation, behavioral problems, dysphoric mood, sleep disturbance and suicidal ideas. The patient is not nervous/anxious. Objective   Physical Exam  Vitals and nursing note reviewed. Constitutional:       Appearance: Normal appearance. HENT:      Head: Normocephalic and atraumatic. Right Ear: External ear normal.      Left Ear: External ear normal.      Mouth/Throat:      Mouth: Mucous membranes are moist.   Eyes:      General: No scleral icterus. Extraocular Movements: Extraocular movements intact. Pupils: Pupils are equal, round, and reactive to light. Cardiovascular:      Rate and Rhythm: Normal rate and regular rhythm. Pulses: Normal pulses. Heart sounds: No murmur heard. No friction rub. No gallop. Pulmonary:      Effort: Pulmonary effort is normal. No respiratory distress. Breath sounds: Normal breath sounds. No stridor. No wheezing. Abdominal:      General: Bowel sounds are normal. There is no distension. Tenderness: There is no abdominal tenderness. There is no right CVA tenderness or left CVA tenderness. Musculoskeletal:         General: Tenderness present. No swelling. Normal range of motion. Cervical back: Normal range of motion. No rigidity. Right lower leg: No edema. Left lower leg: No edema. Comments: Very tight in the BL shoulders and traps   Skin:     General: Skin is warm and dry. Capillary Refill: Capillary refill takes 2 to 3 seconds. Coloration: Skin is not pale. Neurological:      General: No focal deficit present.       Mental Status: She is alert and oriented to person, place, and time. Mental status is at baseline. Cranial Nerves: No cranial nerve deficit. Deep Tendon Reflexes: Reflexes normal.   Psychiatric:         Mood and Affect: Mood normal.         Behavior: Behavior normal.         Thought Content: Thought content normal.         Judgment: Judgment normal.                An electronic signature was used to authenticate this note.     --Nena Pearson MD

## 2022-10-20 ENCOUNTER — OFFICE VISIT (OUTPATIENT)
Dept: FAMILY MEDICINE CLINIC | Facility: CLINIC | Age: 27
End: 2022-10-20
Payer: COMMERCIAL

## 2022-10-20 VITALS
WEIGHT: 168 LBS | BODY MASS INDEX: 30.91 KG/M2 | DIASTOLIC BLOOD PRESSURE: 70 MMHG | SYSTOLIC BLOOD PRESSURE: 112 MMHG | HEIGHT: 62 IN

## 2022-10-20 DIAGNOSIS — F41.1 GAD (GENERALIZED ANXIETY DISORDER): ICD-10-CM

## 2022-10-20 DIAGNOSIS — N92.6 IRREGULAR MENSES: ICD-10-CM

## 2022-10-20 DIAGNOSIS — J18.9 PNEUMONIA OF LEFT UPPER LOBE DUE TO INFECTIOUS ORGANISM: Primary | ICD-10-CM

## 2022-10-20 DIAGNOSIS — J98.01 BRONCHOSPASM: ICD-10-CM

## 2022-10-20 PROCEDURE — 99214 OFFICE O/P EST MOD 30 MIN: CPT | Performed by: FAMILY MEDICINE

## 2022-10-20 RX ORDER — ALBUTEROL SULFATE 90 UG/1
2 AEROSOL, METERED RESPIRATORY (INHALATION) 4 TIMES DAILY PRN
Qty: 54 G | Refills: 1 | Status: SHIPPED | OUTPATIENT
Start: 2022-10-20

## 2022-10-20 RX ORDER — AZITHROMYCIN 250 MG/1
250 TABLET, FILM COATED ORAL SEE ADMIN INSTRUCTIONS
Qty: 6 TABLET | Refills: 0 | Status: SHIPPED | OUTPATIENT
Start: 2022-10-20 | End: 2022-10-25 | Stop reason: ALTCHOICE

## 2022-10-20 RX ORDER — CEFDINIR 300 MG/1
300 CAPSULE ORAL 2 TIMES DAILY
Qty: 14 CAPSULE | Refills: 0 | Status: SHIPPED | OUTPATIENT
Start: 2022-10-20 | End: 2022-10-25 | Stop reason: ALTCHOICE

## 2022-10-20 ASSESSMENT — ENCOUNTER SYMPTOMS
CHEST TIGHTNESS: 0
ABDOMINAL PAIN: 0
SHORTNESS OF BREATH: 0
NAUSEA: 1
BLOOD IN STOOL: 0
COUGH: 1

## 2022-10-20 NOTE — PROGRESS NOTES
[address]  _______________________________________  Shelley Meckel, MD Kathyleen Gander, DO Nathen Gross, NP Freada Douglas, MD Dorice Mame, [address] [place] [address], [address] [place] [address]  Phone: [phone]  Fax: [phone]    Makayla Hdz (:  1995) is a 32 y.o. female,Established patient, here for evaluation of the following chief complaint(s):  Cough (Congestion, headache x 2 weeks seen at 's care finished antibiotic and steroids but still has a cough  ) and Irregular Menses (Has not had a normal period since stopping depo injections about 6 months ago )         ASSESSMENT/PLAN:    1. Pneumonia of left upper lobe due to infectious organism  Broaden coverage to [employer] + Zpack to cover for CAP. Add albuterol for bronchospasm. Eat yogurt once a day while on these Abx. If there is worsening BAHENA or CP, she is to head to the ER. Satting 99% on RA as she was leaving here. - cefdinir (OMNICEF) 300 MG capsule; Take 1 capsule by mouth 2 times daily for 7 days  Dispense: 14 capsule; Refill: 0  - azithromycin (ZITHROMAX) 250 MG tablet; Take 1 tablet by mouth See Admin Instructions for 5 days 500mg on day 1 followed by 250mg on days 2 - 5  Dispense: 6 tablet; Refill: 0    2. JV (generalized anxiety disorder)  I told her she can resume meds or not depending on how she is feeling. Will call with any questions. 3. Irregular menses  I asked her to FU with her GYN first. If they are unable to accommodate her (everyone has the FLU this week) I could step in and start her on combo OCPs. Would like to defer to GYN first as they know her better. 4. Bronchospasm  As above. To ER with worsening sx.   - albuterol sulfate HFA (VENTOLIN HFA) 108 (90 Base) MCG/ACT inhaler;  Inhale 2 puffs into the lungs 4 times daily as needed for Wheezing  Dispense: 54 g; Refill: 1    FU 3m    Subjective   SUBJECTIVE/OBJECTIVE:    Anxiety: Is supposed to be on Inderal 60 and Lexapro 5 but has been sick with chest infection x 2 weeks. Was seen at [facility] and put on Doxy and prednisone, still coughing. In fact the meds from [facility] has not helped at all. Coughing to the point of emesis. BP Readings from Last 3 Encounters:   10/20/22 112/70   09/22/22 120/80   08/25/22 137/84     Wt Readings from Last 3 Encounters:   10/20/22 168 lb (76.2 kg)   09/22/22 162 lb (73.5 kg)   08/25/22 162 lb (73.5 kg)     Lab Results   Component Value Date    MMF5QUO 0.652 08/25/2022     Also continues to have irregular menses after stopping depo shots 6m ago. Initially had a lot of withdrawal bleeding and cramping. Still passing clots intermittently. She wanted to come off depo and possibly switch to IUD through her GYN office. Review of Systems   Constitutional:  Positive for fatigue. Negative for chills and fever. Respiratory:  Positive for cough. Negative for chest tightness and shortness of breath. Cardiovascular:  Negative for chest pain. Gastrointestinal:  Positive for nausea. Negative for abdominal pain and blood in stool. Genitourinary:  Negative for hematuria. Neurological:  Positive for headaches. Negative for syncope. Psychiatric/Behavioral:  Negative for agitation, behavioral problems, dysphoric mood, sleep disturbance and suicidal ideas. The patient is not nervous/anxious. Objective   Physical Exam  Vitals and nursing note reviewed. Constitutional:       Appearance: Normal appearance. HENT:      Head: Normocephalic and atraumatic. Right Ear: External ear normal.      Left Ear: External ear normal.      Nose: Rhinorrhea present. Mouth/Throat:      Mouth: Mucous membranes are moist.   Eyes:      General: No scleral icterus. Extraocular Movements: Extraocular movements intact. Pupils: Pupils are equal, round, and reactive to light. Cardiovascular:      Rate and Rhythm: Normal rate and regular rhythm. Pulses: Normal pulses.       Heart sounds: No murmur heard. No friction rub. No gallop. Pulmonary:      Effort: Pulmonary effort is normal. No respiratory distress. Breath sounds: No stridor. Wheezing and rales present. Comments: NO accessory mm use  There is scant air movement in the QUIANA and LLL with mild dullness to percussion. There are scattered wheezes and crackles throughout  Abdominal:      General: Bowel sounds are normal. There is no distension. Tenderness: There is no abdominal tenderness. There is no right CVA tenderness or left CVA tenderness. Musculoskeletal:         General: No swelling or tenderness. Normal range of motion. Cervical back: Normal range of motion. No rigidity. Right lower leg: No edema. Left lower leg: No edema. Skin:     General: Skin is warm and dry. Capillary Refill: Capillary refill takes 2 to 3 seconds. Coloration: Skin is not pale. Neurological:      General: No focal deficit present. Mental Status: She is alert and oriented to person, place, and time. Mental status is at baseline. Cranial Nerves: No cranial nerve deficit. Deep Tendon Reflexes: Reflexes normal.   Psychiatric:         Mood and Affect: Mood normal.         Behavior: Behavior normal.         Thought Content: Thought content normal.         Judgment: Judgment normal.                An electronic signature was used to authenticate this note.     --Sowmya Presley MD

## 2022-10-20 NOTE — LETTER
[address] [facility] [address] [address]  Phone: [phone]  Fax: [phone]    Abdirizak Cedillo MD        October 20, 2022     Patient: Juanjo Tyson   YOB: 1995   Date of Visit: 10/20/2022       To Whom It May Concern: It is my medical opinion that Francena Councilman may return to work on Monday, 10/24/22 without restrictions. .    If you have any questions or concerns, please don't hesitate to call.     Sincerely,        Abdirizak Cedillo MD

## 2022-10-25 ENCOUNTER — TELEMEDICINE (OUTPATIENT)
Dept: FAMILY MEDICINE CLINIC | Facility: CLINIC | Age: 27
End: 2022-10-25
Payer: COMMERCIAL

## 2022-10-25 DIAGNOSIS — J18.9 PNEUMONIA OF LEFT UPPER LOBE DUE TO INFECTIOUS ORGANISM: Primary | ICD-10-CM

## 2022-10-25 PROCEDURE — 99214 OFFICE O/P EST MOD 30 MIN: CPT | Performed by: FAMILY MEDICINE

## 2022-10-25 RX ORDER — HYDROCODONE BITARTRATE AND HOMATROPINE METHYLBROMIDE ORAL SOLUTION 5; 1.5 MG/5ML; MG/5ML
2.5 LIQUID ORAL
Qty: 17.5 ML | Refills: 0 | Status: SHIPPED | OUTPATIENT
Start: 2022-10-25 | End: 2022-11-01

## 2022-10-25 RX ORDER — LEVOFLOXACIN 750 MG/1
750 TABLET ORAL DAILY
Qty: 7 TABLET | Refills: 0 | Status: SHIPPED | OUTPATIENT
Start: 2022-10-25 | End: 2022-11-01

## 2022-10-25 ASSESSMENT — ENCOUNTER SYMPTOMS
SINUS PRESSURE: 1
COUGH: 1
SINUS PAIN: 1

## 2022-10-25 NOTE — PROGRESS NOTES
Denise Bui (:  1995) is a Established patient, here for evaluation of the following:    Chief Complaint   Patient presents with    Pneumonia     Follow up from [address] [place] [address] Rd 10/20 almost finished with antibiotic and not any better. She still has a cough       Assessment & Plan   Below is the assessment and plan developed based on review of pertinent history, physical exam, labs, studies, and medications. 1. Pneumonia of left upper lobe due to infectious organism  Will broaden to levaquin. Add hycodan at night as she is not sleeping because of her cough. If not turning the corner by Friday she will need to be re evaluated in person by someone.  - levoFLOXacin (LEVAQUIN) 750 MG tablet; Take 1 tablet by mouth daily for 7 days  Dispense: 7 tablet; Refill: 0  - HYDROcodone homatropine (HYCODAN) 5-1.5 MG/5ML solution; Take 2.5 mLs by mouth nightly as needed (Cough) for up to 7 days. Dispense: 17.5 mL; Refill: 0    FU PRN    Subjective     Was put on Omnicef/Zpack a week ago for clinical QUIANA PNA. She states she is continuing to cough in spite of this treatment. Still having ear pain and coughing paroxysms in spite of this treatment. She states her pain in her sternum when she coughs is getting pretty awful. Still having ear pain, but no fevers. Cough is on the dry side. She is trying to work today. She is continuing to use albuterol which doesn't stop her cough. Not sleeping because of cough. Review of Systems   Constitutional:  Positive for diaphoresis and fatigue. Negative for chills and fever. HENT:  Positive for sinus pressure and sinus pain. Respiratory:  Positive for cough. Cardiovascular:  Positive for chest pain. Neurological:  Positive for headaches.         Objective   Patient-Reported Vitals  No data recorded     Physical Exam  [INSTRUCTIONS:  \"[x]\" Indicates a positive item  \"[]\" Indicates a negative item  -- DELETE ALL ITEMS NOT EXAMINED]    Constitutional: [x] Appears well-developed and well-nourished [x] No apparent distress      [] Abnormal -     Mental status: [x] Alert and awake  [x] Oriented to person/place/time [x] Able to follow commands    [] Abnormal -     Eyes:   EOM    [x]  Normal    [] Abnormal -   Sclera  [x]  Normal    [] Abnormal -          Discharge [x]  None visible   [] Abnormal -     HENT: [x] Normocephalic, atraumatic  [] Abnormal -   [x] Mouth/Throat: Mucous membranes are moist    External Ears [x] Normal  [] Abnormal -    Neck: [x] No visualized mass [] Abnormal -     Pulmonary/Chest: [x] Respiratory effort normal   [x] No visualized signs of difficulty breathing or respiratory distress        [x] Abnormal - Coughing throughout interview     Musculoskeletal:   [x] Normal gait with no signs of ataxia         [x] Normal range of motion of neck        [] Abnormal -     Neurological:        [x] No Facial Asymmetry (Cranial nerve 7 motor function) (limited exam due to video visit)          [x] No gaze palsy        [] Abnormal -          Skin:        [x] No significant exanthematous lesions or discoloration noted on facial skin         [] Abnormal -            Psychiatric:       [x] Normal Affect [] Abnormal -        [x] No Hallucinations    Other pertinent observable physical exam findings:-             Sarah Araujo, was evaluated through a synchronous (real-time) audio-video encounter. The patient (or guardian if applicable) is aware that this is a billable service, which includes applicable co-pays. This Virtual Visit was conducted with patient's (and/or legal guardian's) consent. The visit was conducted pursuant to the emergency declaration under the [address] [place] [address], [address] [place] [address] authority and the [employer] Act. Patient identification was verified, and a caregiver was present when appropriate.    The patient was located at Home: [address] [place] [id number]. Provider was located at [facility] (Appt Dept): [id number] ,  [facility] 4.         --Sergo Bourne MD

## 2023-01-23 ENCOUNTER — OFFICE VISIT (OUTPATIENT)
Dept: FAMILY MEDICINE CLINIC | Facility: CLINIC | Age: 28
End: 2023-01-23
Payer: COMMERCIAL

## 2023-01-23 VITALS
BODY MASS INDEX: 31.1 KG/M2 | HEIGHT: 62 IN | SYSTOLIC BLOOD PRESSURE: 120 MMHG | WEIGHT: 169 LBS | DIASTOLIC BLOOD PRESSURE: 80 MMHG

## 2023-01-23 DIAGNOSIS — G43.009 MIGRAINE WITHOUT AURA AND WITHOUT STATUS MIGRAINOSUS, NOT INTRACTABLE: ICD-10-CM

## 2023-01-23 DIAGNOSIS — F41.1 GAD (GENERALIZED ANXIETY DISORDER): Primary | ICD-10-CM

## 2023-01-23 PROCEDURE — 99214 OFFICE O/P EST MOD 30 MIN: CPT | Performed by: FAMILY MEDICINE

## 2023-01-23 RX ORDER — CLONAZEPAM 0.5 MG/1
.25-.5 TABLET ORAL 2 TIMES DAILY PRN
Qty: 60 TABLET | Refills: 0 | Status: SHIPPED | OUTPATIENT
Start: 2023-01-23 | End: 2023-02-22

## 2023-01-23 ASSESSMENT — ENCOUNTER SYMPTOMS
SHORTNESS OF BREATH: 0
BLOOD IN STOOL: 0
CHEST TIGHTNESS: 0
ABDOMINAL PAIN: 0

## 2023-01-23 NOTE — PROGRESS NOTES
[place]  _______________________________________  MD Charmaine Sandy DO Dwayne Rue, MD Jose Hope MD    [address] , [address] [facility]  Phone: [phone]  Fax: [phone]    Sumi Bell (:  1995) is a 32 y.o. female,Established patient, here for evaluation of the following chief complaint(s): Anxiety (Follow up [address] stopped taking medication for a month and restarted a week ago )         ASSESSMENT/PLAN:    1. JV (generalized anxiety disorder)  Reviewed proper use of Bz to abort panic. Start ultra low dose. Don't drive after taking. Do not combine with alcohol. Continue SSRI/BB. May need to increase SSRI will recheck after she's been on it for a month. Narc agreement signed. I have reviewed the patients controlled substance prescription history, as maintained in the [place] prescription monitoring program, so that the prescription(s) for a  controlled substance can be given. - clonazePAM (KLONOPIN) 0.5 MG tablet; Take 0.5-1 tablets by mouth 2 times daily as needed for Anxiety for up to 30 days. Max Daily Amount: 1 mg  Dispense: 60 tablet; Refill: 0    2. Migraine without aura and without status migrainosus, not intractable  Continue BB for now. Will monitor. Subjective   SUBJECTIVE/OBJECTIVE:      Anxiety: Was on lexapro 10 and inderal 60 for panic anxiety and Migraine Ppx. Stopped taking meds for a month after having a bout of PNA. Went back on meds about a week ago after having panic being in crowds like in a crowded store. Last night she had a panic attack with feelings of chest tightness that went away on its own. Migraines got bad again so she also resumes the inderal which has helped. 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 IN the past antihistamines have not helped much with panic.      Vitals:    23 1547   BP: 120/80     BP Readings from Last 3 Encounters:   01/23/23 120/80   10/20/22 112/70   09/22/22 120/80     Lab Results   Component Value Date    ZJY2EVZ 0.652 08/25/2022     Lab Results   Component Value Date     06/06/2022    K 3.9 06/06/2022     (H) 06/06/2022    CO2 23 06/06/2022    BUN 15 06/06/2022    CREATININE 1.48 (H) 06/06/2022    GLUCOSE 81 06/06/2022    CALCIUM 9.6 06/06/2022    PROT 6.9 06/06/2022    LABALBU 4.3 06/06/2022    BILITOT 0.2 06/06/2022    ALKPHOS 89 06/06/2022    AST 37 06/06/2022    ALT 44 06/06/2022    LABGLOM 45 (L) 06/06/2022    GFRAA 54 (L) 06/06/2022    GLOB 2.6 06/06/2022         Review of Systems   Constitutional:  Negative for chills and fever. Respiratory:  Negative for chest tightness and shortness of breath. Cardiovascular:  Negative for chest pain. Gastrointestinal:  Negative for abdominal pain and blood in stool. Genitourinary:  Negative for hematuria. Neurological:  Negative for syncope. Objective   Physical Exam  Vitals and nursing note reviewed. Constitutional:       Appearance: Normal appearance. HENT:      Head: Normocephalic and atraumatic. Right Ear: External ear normal.      Left Ear: External ear normal.      Mouth/Throat:      Mouth: Mucous membranes are moist.   Eyes:      General: No scleral icterus. Extraocular Movements: Extraocular movements intact. Pupils: Pupils are equal, round, and reactive to light. Cardiovascular:      Rate and Rhythm: Normal rate and regular rhythm. Pulses: Normal pulses. Heart sounds: No murmur heard. No friction rub. No gallop. Pulmonary:      Effort: Pulmonary effort is normal. No respiratory distress. Breath sounds: Normal breath sounds. No stridor. No wheezing. Abdominal:      General: Bowel sounds are normal. There is no distension. Tenderness: There is no abdominal tenderness. There is no right CVA tenderness or left CVA tenderness.    Musculoskeletal:         General: No swelling or tenderness. Normal range of motion. Cervical back: Normal range of motion. No rigidity. Right lower leg: No edema. Left lower leg: No edema. Skin:     General: Skin is warm and dry. Coloration: Skin is not pale. Neurological:      General: No focal deficit present. Mental Status: She is alert and oriented to person, place, and time. Mental status is at baseline. Cranial Nerves: No cranial nerve deficit. Deep Tendon Reflexes: Reflexes normal.   Psychiatric:         Mood and Affect: Mood normal.         Behavior: Behavior normal.         Thought Content: Thought content normal.         Judgment: Judgment normal.                An electronic signature was used to authenticate this note.     --Lynn Stack MD

## 2023-01-23 NOTE — LETTER
CONTROLLED SUBSTANCE MEDICATION AGREEMENT     Patient Name: Lisa Lindquist  Patient YOB: 1995   I understand, that controlled substance medications may be used to help better manage my symptoms and to improve my ability to function at home, work and in social settings. However, I also understand that these medications do have risks, which have been discussed with me, including possible development of physical or psychological dependence. I understand that successful treatment requires mutual trust and honesty between me and my provider. I understand and agree that following this Medication Agreement is necessary in continuing my provider-patient relationship and the success of my treatment plan. Explanation from my Provider: Benefits and Goals of Controlled Substance Medications: There are two potential goals for your treatment: (1) decreased pain and suffering (2) improved daily life functions. There are many possible treatments for your chronic condition(s). Alternatives such as physical therapy, yoga, massage, home daily exercise, meditation, relaxation techniques, injections, chiropractic manipulations, surgery, cognitive therapy, hypnosis and many medications that are not habit-forming may be used. Use of controlled substance medications may be helpful, but they are unlikely to resolve all symptoms or restore all function. Explanation from my Provider: Risks of Controlled Substance Medications:  Opioid pain medications: These medications can lead to problems such as addiction/dependence, sedation, lightheadedness/dizziness, memory issues, falls, constipation, nausea, or vomiting. They may also impair the ability to drive or operate machinery. Additionally, these medications may lower testosterone levels, leading to loss of bone strength, stamina and sex drive.   They may cause problems with breathing, sleep apnea and reduced coughing, which is especially dangerous for patients with lung disease. Overdose or dangerous interactions with alcohol and other medications may occur, leading to death. Hyperalgesia may develop, which means patients receiving opioids for the treatment of pain may become more sensitive to certain painful stimuli, and in some cases, experience pain from ordinarily non-painful stimuli. Women between the ages of 14-53 who could become pregnant should carefully weigh the risks and benefits of opioids with their physicians, as these medications increase the risk of pregnancy complications, including miscarriage,  delivery and stillbirth. It is also possible for babies to be born addicted to opioids. Opioid dependence withdrawal symptoms may include; feelings of uneasiness, increased pain, irritability, belly pain, diarrhea, sweats and goose-flesh. Benzodiazepines and non-benzodiazepine sleep medications: These medications can lead to problems such as addiction/dependence, sedation, fatigue, lightheadedness, dizziness, incoordination, falls, depression, hallucinations, and impaired judgment, memory and concentration. The ability to drive and operate machinery may also be affected. Abnormal sleep-related behaviors have been reported, including sleepwalking, driving, making telephone calls, eating, or having sex while not fully awake. These medications can suppress breathing and worsen sleep apnea, particularly when combined with alcohol or other sedating medications, potentially leading to death. Dependence withdrawal symptoms may include tremors, anxiety, hallucinations and seizures. Stimulants:  Common adverse effects include addiction/dependence, increased blood  pressure and heart rate, decreased appetite, nausea, involuntary weight loss, insomnia,                                                                                                                     Initials:_______   irritability, and headaches.   These risks may increase when these medications are combined with other stimulants, such as caffeine pills or energy drinks, certain weight loss supplements and oral decongestants. Dependence withdrawal symptoms may include depressed mood, loss of interest, suicidal thoughts, anxiety, fatigue, appetite changes and agitation. Testosterone replacement therapy:  Potential side effects include increased risk of stroke and heart attack, blood clots, increased blood pressure, increased cholesterol, enlarged prostate, sleep apnea, irritability/aggression and other mood disorders, and decreased fertility. I agree and understand that I and my prescriber have the following rights and responsibilities regarding my treatment plan:     1. MY RIGHTS:  To be informed of my treatment and medication plan. To be an active participant in my health and wellbeing. 2. MY RESPONSIBILITY AND UNDERSTANDING FOR USE OF MEDICATIONS   I will take medications at the dose and frequency as directed. For my safety, I will not increase or change how I take my medications without the recommendation of my healthcare provider.  I will actively participate in any program recommended by my provider which may improve function, including social, physical, psychological programs.  I will not take my medications with alcohol or other drugs not prescribed to me. I understand that drinking alcohol with my medications increases the chances of side effects, including reduced breathing rate and could lead to personal injury when operating machinery.  I understand that if I have a history of substance use disorders, including alcohol or other illicit drugs, that I may be at increased risk of addiction to my medications.  I agree to notify my provider immediately if I should become pregnant so that my treatment plan can be adjusted.    I agree and understand that I shall only receive controlled substance medications from the prescriber that signed this agreement unless there is written agreement among other prescribers of controlled substances outlining the responsibility of the medications being prescribed.  I understand that the if the controlled medication is not helping to achieve goals, the dosage may be tapered and no longer prescribed. 3. MY RESPONSIBILITY FOR COMMUNICATION / PRESCRIPTION RENEWALS   I agree that all controlled substance medications that I take will be prescribed only by my provider. If another healthcare provider prescribes me medication in an emergency, I will notify my provider within seventy-two (72) hours.  I will arrange for refills at the prescribed interval ONLY during regular office hours. I will not ask for refills earlier than agreed, after-hours, on holidays or weekends. Refills may take up to 72 hours for processing and prescriptions to reach the pharmacy.  I will inform my other health care providers that I am taking these medications and of the existence of this [facility] [address]. In the event of an emergency, I will provide the same information to the emergency department prescribers.  I will keep my provider updated on the pharmacy I am using for controlled medication prescription filling. Initials:_______  4. MY RESPONSIBILITY FOR PROTECTING MEDICATIONS   I will protect my prescriptions and medications. I understand that lost or misplaced prescriptions will not be replaced.  I will keep medications only for my own use and will not share them with others. I will keep all medications away from children.  I agree that if my medications are adjusted or discontinued, I will properly dispose of any remaining medications. I understand that I will be required to dispose of any remaining controlled medications as, directed by my prescriber, prior to being provided with any prescriptions for other controlled medications.   Medication drop box locations can be found at: [email or web address]    5. MY RESPONSIBILITY WITH ILLEGAL DRUGS    I will not use illegal or street drugs or another person's prescription medications not prescribed to me.  If there are identified addiction type symptoms, then referral to a program may be provided by my provider and I agree to follow through with this recommendation. 6. MY RESPONSIBILITY FOR COOPERATION WITH INVESTIGATIONS   I understand that my provider will comply with any applicable law and may discuss my use and/or possible misuse/abuse of controlled substances and alcohol, as appropriate, with any health care provider involved in my care, pharmacist, or legal authority.  I authorize my provider and pharmacy to cooperate fully with law enforcement agencies (as permitted by law) in the investigation of any possible misuse, sale, or other diversion of my controlled substances.  I agree to waive any applicable privilege or right of privacy or confidentiality with respect to these authorizations. 7. PROVIDERS RIGHT TO MONITOR FOR SAFETY: PRESCRIPTION MONITORING / DRUG TESTING   I consent to drug/toxicology screening and will submit to a drug screen upon my providers request to assure I am only taking the prescribed drugs for my safety monitoring. I understand that a drug screen is a laboratory test in which a sample of my urine, blood or saliva is checked to see what drugs I have been taking. This may entail an observed urine specimen, which means that a nurse or other health care provider may watch me provide urine, and I will cooperate if I am asked to provide an observed specimen.  I understand that my provider will check a copy of my State Prescription Monitoring Program () Report in order to safely prescribe medications.  Pill Counts: I consent to pill counts when requested.   I may be asked to bring all my prescribed controlled substance medications, in their original bottles, to all of my scheduled appointments. In addition, my provider may ask me to come to the practice at any time for a random pill count. 8. TERMINATION OF THIS AGREEMENT  For my safety, my prescriber has the right to stop prescribing controlled substance medications and may end this agreement. Initials:_______   Conditions that may result in termination of this agreement:  a. I do not show any improvement in pain, or my activity has not improved. b. I develop rapid tolerance or loss of improvement, as described in my treatment plan.  c. I develop significant side effects from the medication. d. My behavior is not consistent with the responsibilities outlined above, thereby causing safety concerns to continue prescribing controlled substance medications. e. I fail to follow the terms of this agreement. f. Other:____________________________       UNDERSTANDING THIS MEDICATION AGREEMENT:    I have read the above and have had all my questions answered. For chronic disease management, I know that my symptoms can be managed with many types of treatments. A chronic medication trial may be part of my treatment, but I must be an active participant in my care. Medication therapy is only one part of my symptom management plan. In some cases, there may be limited scientific evidence to support the chronic use of certain medications to improve symptoms and daily function. Furthermore, in certain circumstances, there may be scientific information that suggests that the use of chronic controlled substances may worsen my symptoms and increase my risk of unintentional death directly related to this medication therapy. I know that if my provider feels my risk from controlled medications is greater than my benefit, I will have my controlled substance medication(s) compassionately lowered or removed altogether.      I further agree to allow this office to contact my HIPAA contact if there are concerns about my safety and use of the controlled medications. I have agreed to use the prescribed controlled substance medications to me as instructed by my provider and as stated in this Medication Agreement. My initial on each page and my signature below shows that I have read each page and I have had the opportunity to ask questions with answers provided by my provider.     Patient Name (Printed): _____________________________________  Patient Signature:  ______________________   Date: _____________    Prescriber Name (Printed): ___________________________________  Prescriber Signature: _____________________  Date: _____________

## 2023-06-27 RX ORDER — MEDROXYPROGESTERONE ACETATE 150 MG/ML
INJECTION, SUSPENSION INTRAMUSCULAR
Refills: 3 | OUTPATIENT
Start: 2023-06-27

## 2023-09-18 RX ORDER — MEDROXYPROGESTERONE ACETATE 150 MG/ML
INJECTION, SUSPENSION INTRAMUSCULAR
Refills: 3 | OUTPATIENT
Start: 2023-09-18

## 2024-01-15 NOTE — PROGRESS NOTES
The patient is a 28 y.o. No obstetric history on file. who is seen for menorrhagia/dysmenorrhea for the last year. States symptoms seem to be progressively worsening. Cycles anywhere from every 14 - 28. On heavy days will change pads hourly. Denies pain throughout the month. Denies pain with BM.Does note when pain with cycles starts, is sometimes painful to sit down. Occasional pain with intercourse. Denies bleeding with intercouse.  Notes menarche approx age [age]. Notes was on depo years ago which overall worked well for her.  Denies vaginal discharge/odor/itching. Denies burning/frequency with urination. Denies flank pain/hematuria. Had TVUS on 2021, which was suggestive of PCOS, for which she is on no management. Desires children in next 2-3 years. Occasional acne issues. Pos hirsutism.     HISTORY:    No obstetric history on file.  Patient's last menstrual period was 01/10/2024 (approximate).  Sexual History:  single partner, contraception - condoms  Contraception:  none  Current Outpatient Medications on File Prior to Visit   Medication Sig Dispense Refill    clonazePAM (KLONOPIN) 0.5 MG tablet Take 0.5-1 tablets by mouth 2 times daily as needed for Anxiety for up to 30 days. Max Daily Amount: 1 mg 60 tablet 0    escitalopram (LEXAPRO) 10 MG tablet Take 0.5 tablets by mouth daily (Patient not taking: Reported on 1/16/2024) 90 tablet 1    propranolol (INDERAL LA) 60 MG extended release capsule Take 1 capsule by mouth daily (Patient not taking: Reported on 1/16/2024) 90 capsule 1     No current facility-administered medications on file prior to visit.       ROS:  Feeling well. No dyspnea or chest pain on exertion.  No abdominal pain, change in bowel habits, black or bloody stools.  No urinary tract symptoms. GYN ROS: see above    PHYSICAL EXAM:  Blood pressure 121/80, height 1.575 m (5' 2\"), weight 74.4 kg (164 lb 1.6 oz), last menstrual period 01/10/2024.    The patient appears well, alert, oriented x 3, in no

## 2024-01-16 ENCOUNTER — OFFICE VISIT (OUTPATIENT)
Dept: OBGYN CLINIC | Age: 29
End: 2024-01-16
Payer: COMMERCIAL

## 2024-01-16 VITALS
WEIGHT: 164.1 LBS | SYSTOLIC BLOOD PRESSURE: 121 MMHG | HEIGHT: 62 IN | BODY MASS INDEX: 30.2 KG/M2 | DIASTOLIC BLOOD PRESSURE: 80 MMHG

## 2024-01-16 DIAGNOSIS — N97.0 ANOVULATORY BLEEDING: Primary | ICD-10-CM

## 2024-01-16 DIAGNOSIS — L68.0 HIRSUTISM: ICD-10-CM

## 2024-01-16 DIAGNOSIS — L70.9 ACNE, UNSPECIFIED ACNE TYPE: ICD-10-CM

## 2024-01-16 DIAGNOSIS — N97.0 ANOVULATORY BLEEDING: ICD-10-CM

## 2024-01-16 LAB
25(OH)D3 SERPL-MCNC: 6.1 NG/ML (ref 30–100)
FSH SERPL-ACNC: 5.3 MIU/ML
LH SERPL-ACNC: 7.9 MIU/ML
PROLACTIN SERPL-MCNC: 7.2 NG/ML
T4 FREE SERPL-MCNC: 1.1 NG/DL (ref 0.78–1.46)
TSH, 3RD GENERATION: 1.12 UIU/ML (ref 0.36–3.74)

## 2024-01-16 PROCEDURE — 99214 OFFICE O/P EST MOD 30 MIN: CPT | Performed by: NURSE PRACTITIONER

## 2024-01-17 LAB
EST. AVERAGE GLUCOSE BLD GHB EST-MCNC: 105 MG/DL
HBA1C MFR BLD: 5.3 % (ref 4.8–5.6)

## 2024-01-20 LAB — MIS SERPL-MCNC: 20.3 NG/ML

## 2024-01-22 DIAGNOSIS — E55.9 VITAMIN D DEFICIENCY: Primary | ICD-10-CM

## 2024-01-22 RX ORDER — ERGOCALCIFEROL 1.25 MG/1
50000 CAPSULE ORAL WEEKLY
Qty: 4 CAPSULE | Refills: 1 | Status: SHIPPED | OUTPATIENT
Start: 2024-01-22

## 2024-01-23 NOTE — PROGRESS NOTES
The patient is a 28 y.o. No obstetric history on file. who is seen for recheck for pcos/heavy painful periods.  Was seen 1/16/24 for c/o menorrhagia/dysmenorrhea for the last year. Stated symptoms seem to be progressively worsening. Cycles anywhere from every 14 - 28 days each. On heavy days will change pads hourly. Denied pain throughout the month. Denied pain with BM. Did note when pain with cycles starts, is sometimes painful to sit down. Occasional pain with intercourse. Denied bleeding with intercouse. Noted menarche approx age [age]. Noted was on depo years ago which overall worked well for her. Denied vaginal discharge/odor/itching. Denied burning/frequency with urination. Denied flank pain/hematuria. Had TVUS on 2021, which was suggestive of PCOS, for which she is on no management. Desires children in next 2-3 years. Occasional acne issues. Pos hirsutism. AMH 20.3 and Vit D 6.1 for which she's undergoing replacement    US findings today:  Uterus appears normal  Endo = 3.7mm and appears normal  Rt ovary appears plump with follicles along with the periphery  Lt ovary appears plump with follicles along with the periphery  Both ovaries appears polycystic   No free fluid seen    HISTORY:  No obstetric history on file.  Patient's last menstrual period was 01/10/2024 (approximate).  Sexual History:  has sex with males  Contraception:  none  Current Outpatient Medications on File Prior to Visit   Medication Sig Dispense Refill    vitamin D (ERGOCALCIFEROL) 1.25 MG (89221 UT) CAPS capsule Take 1 capsule by mouth once a week 4 capsule 1    clonazePAM (KLONOPIN) 0.5 MG tablet Take 0.5-1 tablets by mouth 2 times daily as needed for Anxiety for up to 30 days. Max Daily Amount: 1 mg 60 tablet 0     No current facility-administered medications on file prior to visit.       ROS:  Feeling well. No dyspnea or chest pain on exertion.  No abdominal pain, change in bowel habits, black or bloody stools.  No urinary tract

## 2024-01-24 ENCOUNTER — PROCEDURE VISIT (OUTPATIENT)
Dept: OBGYN CLINIC | Age: 29
End: 2024-01-24
Payer: COMMERCIAL

## 2024-01-24 ENCOUNTER — OFFICE VISIT (OUTPATIENT)
Dept: OBGYN CLINIC | Age: 29
End: 2024-01-24
Payer: COMMERCIAL

## 2024-01-24 VITALS
BODY MASS INDEX: 29.94 KG/M2 | SYSTOLIC BLOOD PRESSURE: 118 MMHG | HEIGHT: 62 IN | WEIGHT: 162.7 LBS | DIASTOLIC BLOOD PRESSURE: 72 MMHG

## 2024-01-24 DIAGNOSIS — N92.6 IRREGULAR MENSES: ICD-10-CM

## 2024-01-24 DIAGNOSIS — N92.1 MENORRHAGIA WITH IRREGULAR CYCLE: Primary | ICD-10-CM

## 2024-01-24 DIAGNOSIS — E28.2 PCOS (POLYCYSTIC OVARIAN SYNDROME): Primary | ICD-10-CM

## 2024-01-24 PROCEDURE — 99214 OFFICE O/P EST MOD 30 MIN: CPT | Performed by: NURSE PRACTITIONER

## 2024-01-24 PROCEDURE — 76830 TRANSVAGINAL US NON-OB: CPT | Performed by: OBSTETRICS & GYNECOLOGY

## 2024-01-24 RX ORDER — NORGESTIMATE AND ETHINYL ESTRADIOL 0.25-0.035
1 KIT ORAL DAILY
Qty: 3 PACKET | Refills: 3 | Status: SHIPPED | OUTPATIENT
Start: 2024-01-24

## 2024-01-25 LAB — TESTOST FREE SERPL-MCNC: 1.1 PG/ML (ref 0–4.2)

## 2024-04-07 DIAGNOSIS — E55.9 VITAMIN D DEFICIENCY: ICD-10-CM

## 2024-04-08 RX ORDER — ERGOCALCIFEROL 1.25 MG/1
50000 CAPSULE ORAL WEEKLY
Qty: 12 CAPSULE | Refills: 1 | OUTPATIENT
Start: 2024-04-08

## 2024-06-18 ENCOUNTER — HOSPITAL ENCOUNTER (EMERGENCY)
Age: 29
Discharge: HOME | End: 2024-06-18
Attending: EMERGENCY MEDICINE
Payer: COMMERCIAL

## 2024-06-18 ENCOUNTER — APPOINTMENT (OUTPATIENT)
Dept: CT IMAGING | Age: 29
End: 2024-06-18
Payer: COMMERCIAL

## 2024-06-18 ENCOUNTER — APPOINTMENT (OUTPATIENT)
Dept: ULTRASOUND IMAGING | Age: 29
End: 2024-06-18
Payer: COMMERCIAL

## 2024-06-18 VITALS
TEMPERATURE: 98 F | SYSTOLIC BLOOD PRESSURE: 100 MMHG | BODY MASS INDEX: 30.36 KG/M2 | HEIGHT: 62 IN | WEIGHT: 165 LBS | DIASTOLIC BLOOD PRESSURE: 57 MMHG | OXYGEN SATURATION: 98 % | HEART RATE: 78 BPM | RESPIRATION RATE: 19 BRPM

## 2024-06-18 DIAGNOSIS — N83.201 RIGHT OVARIAN CYST: ICD-10-CM

## 2024-06-18 DIAGNOSIS — E28.2 PCOS (POLYCYSTIC OVARIAN SYNDROME): Primary | ICD-10-CM

## 2024-06-18 LAB
ALBUMIN SERPL-MCNC: 4 G/DL (ref 3.5–5)
ALBUMIN/GLOB SERPL: 1.6 (ref 0.4–1.6)
ALP SERPL-CCNC: 70 U/L (ref 45–117)
ALT SERPL-CCNC: 6 U/L (ref 13–61)
ANION GAP SERPL CALC-SCNC: 14 MMOL/L (ref 2–11)
APPEARANCE UR: CLEAR
AST SERPL-CCNC: 16 U/L (ref 15–37)
BACTERIA URNS QL MICRO: 0 /HPF
BASOPHILS # BLD: 0 K/UL (ref 0–0.2)
BASOPHILS NFR BLD: 0 % (ref 0–2)
BILIRUB SERPL-MCNC: 0.2 MG/DL (ref 0.2–1.1)
BILIRUB UR QL: NEGATIVE
BUN SERPL-MCNC: 10 MG/DL (ref 6–23)
CALCIUM SERPL-MCNC: 9.5 MG/DL (ref 8.3–10.4)
CASTS URNS QL MICRO: 0 /LPF
CHLORIDE SERPL-SCNC: 105 MMOL/L (ref 98–107)
CO2 SERPL-SCNC: 20 MMOL/L (ref 21–32)
COLOR UR: YELLOW
CREAT SERPL-MCNC: 1.02 MG/DL (ref 0.6–1)
CRYSTALS URNS QL MICRO: 0 /LPF
DIFFERENTIAL METHOD BLD: ABNORMAL
EOSINOPHIL # BLD: 0.1 K/UL (ref 0–0.8)
EOSINOPHIL NFR BLD: 1 % (ref 0.5–7.8)
EPI CELLS #/AREA URNS HPF: 0 /HPF
ERYTHROCYTE [DISTWIDTH] IN BLOOD BY AUTOMATED COUNT: 14 % (ref 11.9–14.6)
GLOBULIN SER CALC-MCNC: 2.5 G/DL (ref 2.8–4.5)
GLUCOSE SERPL-MCNC: 143 MG/DL (ref 65–100)
GLUCOSE UR STRIP.AUTO-MCNC: NEGATIVE MG/DL
HCG SERPL-ACNC: <1 MIU/ML (ref 0–6)
HCG UR QL: NEGATIVE
HCT VFR BLD AUTO: 37.8 % (ref 35.8–46.3)
HGB BLD-MCNC: 12.8 G/DL (ref 11.7–15.4)
HGB UR QL STRIP: NEGATIVE
IMM GRANULOCYTES # BLD AUTO: 0 K/UL (ref 0–0.5)
IMM GRANULOCYTES NFR BLD AUTO: 0 % (ref 0–5)
KETONES UR QL STRIP.AUTO: NEGATIVE MG/DL
LACTATE SERPL-SCNC: 2.4 MMOL/L (ref 0.5–2)
LEUKOCYTE ESTERASE UR QL STRIP.AUTO: NEGATIVE
LIPASE SERPL-CCNC: 22 U/L (ref 13–60)
LYMPHOCYTES # BLD: 3.8 K/UL (ref 0.5–4.6)
LYMPHOCYTES NFR BLD: 50 % (ref 13–44)
MCH RBC QN AUTO: 28.4 PG (ref 26.1–32.9)
MCHC RBC AUTO-ENTMCNC: 33.9 G/DL (ref 31.4–35)
MCV RBC AUTO: 83.8 FL (ref 82–102)
MONOCYTES # BLD: 0.4 K/UL (ref 0.1–1.3)
MONOCYTES NFR BLD: 5 % (ref 4–12)
MUCOUS THREADS URNS QL MICRO: 0 /LPF
NEUTS SEG # BLD: 3.4 K/UL (ref 1.7–8.2)
NEUTS SEG NFR BLD: 44 % (ref 43–78)
NITRITE UR QL STRIP.AUTO: NEGATIVE
NRBC # BLD: 0 K/UL (ref 0–0.2)
OTHER OBSERVATIONS: NORMAL
PH UR STRIP: 6.5 (ref 5–9)
PLATELET # BLD AUTO: 442 K/UL (ref 150–450)
PMV BLD AUTO: 10 FL (ref 9.4–12.3)
POTASSIUM SERPL-SCNC: 4 MMOL/L (ref 3.5–5.1)
PROT SERPL-MCNC: 6.5 G/DL (ref 6.4–8.2)
PROT UR STRIP-MCNC: NEGATIVE MG/DL
RBC # BLD AUTO: 4.51 M/UL (ref 4.05–5.2)
RBC #/AREA URNS HPF: 0 /HPF
SODIUM SERPL-SCNC: 139 MMOL/L (ref 133–143)
SP GR UR REFRACTOMETRY: 1.01 (ref 1–1.02)
URINE CULTURE IF INDICATED: NORMAL
UROBILINOGEN UR QL STRIP.AUTO: 0.2 EU/DL (ref 0.2–1)
WBC # BLD AUTO: 7.7 K/UL (ref 4.3–11.1)
WBC URNS QL MICRO: 0 /HPF

## 2024-06-18 PROCEDURE — 76830 TRANSVAGINAL US NON-OB: CPT

## 2024-06-18 PROCEDURE — 99285 EMERGENCY DEPT VISIT HI MDM: CPT

## 2024-06-18 PROCEDURE — 6360000002 HC RX W HCPCS: Performed by: EMERGENCY MEDICINE

## 2024-06-18 PROCEDURE — 2580000003 HC RX 258: Performed by: EMERGENCY MEDICINE

## 2024-06-18 PROCEDURE — 6360000004 HC RX CONTRAST MEDICATION: Performed by: EMERGENCY MEDICINE

## 2024-06-18 PROCEDURE — 81001 URINALYSIS AUTO W/SCOPE: CPT

## 2024-06-18 PROCEDURE — 80053 COMPREHEN METABOLIC PANEL: CPT

## 2024-06-18 PROCEDURE — 83605 ASSAY OF LACTIC ACID: CPT

## 2024-06-18 PROCEDURE — 84702 CHORIONIC GONADOTROPIN TEST: CPT

## 2024-06-18 PROCEDURE — 96375 TX/PRO/DX INJ NEW DRUG ADDON: CPT

## 2024-06-18 PROCEDURE — 96374 THER/PROPH/DIAG INJ IV PUSH: CPT

## 2024-06-18 PROCEDURE — 85025 COMPLETE CBC W/AUTO DIFF WBC: CPT

## 2024-06-18 PROCEDURE — 83690 ASSAY OF LIPASE: CPT

## 2024-06-18 PROCEDURE — 81025 URINE PREGNANCY TEST: CPT

## 2024-06-18 PROCEDURE — 96376 TX/PRO/DX INJ SAME DRUG ADON: CPT

## 2024-06-18 PROCEDURE — 74177 CT ABD & PELVIS W/CONTRAST: CPT

## 2024-06-18 RX ORDER — MORPHINE SULFATE 10 MG/ML
6 INJECTION, SOLUTION INTRAMUSCULAR; INTRAVENOUS
Status: COMPLETED | OUTPATIENT
Start: 2024-06-18 | End: 2024-06-18

## 2024-06-18 RX ORDER — MORPHINE SULFATE 4 MG/ML
4 INJECTION, SOLUTION INTRAMUSCULAR; INTRAVENOUS
Status: COMPLETED | OUTPATIENT
Start: 2024-06-18 | End: 2024-06-18

## 2024-06-18 RX ORDER — 0.9 % SODIUM CHLORIDE 0.9 %
1000 INTRAVENOUS SOLUTION INTRAVENOUS
Status: COMPLETED | OUTPATIENT
Start: 2024-06-18 | End: 2024-06-18

## 2024-06-18 RX ORDER — ONDANSETRON 2 MG/ML
4 INJECTION INTRAMUSCULAR; INTRAVENOUS
Status: COMPLETED | OUTPATIENT
Start: 2024-06-18 | End: 2024-06-18

## 2024-06-18 RX ADMIN — MORPHINE SULFATE 6 MG: 10 INJECTION INTRAVENOUS at 09:47

## 2024-06-18 RX ADMIN — MORPHINE SULFATE 4 MG: 4 INJECTION INTRAVENOUS at 09:09

## 2024-06-18 RX ADMIN — SODIUM CHLORIDE 1000 ML: 9 INJECTION, SOLUTION INTRAVENOUS at 09:06

## 2024-06-18 RX ADMIN — ONDANSETRON 4 MG: 2 INJECTION INTRAMUSCULAR; INTRAVENOUS at 09:09

## 2024-06-18 RX ADMIN — IOPAMIDOL 100 ML: 755 INJECTION, SOLUTION INTRAVENOUS at 10:02

## 2024-06-18 ASSESSMENT — PAIN SCALES - GENERAL
PAINLEVEL_OUTOF10: 10
PAINLEVEL_OUTOF10: 5
PAINLEVEL_OUTOF10: 4
PAINLEVEL_OUTOF10: 6
PAINLEVEL_OUTOF10: 10
PAINLEVEL_OUTOF10: 9
PAINLEVEL_OUTOF10: 9

## 2024-06-18 ASSESSMENT — ENCOUNTER SYMPTOMS
NAUSEA: 0
SHORTNESS OF BREATH: 0
COUGH: 0
ABDOMINAL PAIN: 1
BACK PAIN: 0
COLOR CHANGE: 0
DIARRHEA: 0
CONSTIPATION: 0
VOMITING: 0

## 2024-06-18 ASSESSMENT — PAIN DESCRIPTION - ORIENTATION: ORIENTATION: RIGHT

## 2024-06-18 ASSESSMENT — PAIN - FUNCTIONAL ASSESSMENT
PAIN_FUNCTIONAL_ASSESSMENT: 0-10
PAIN_FUNCTIONAL_ASSESSMENT: 0-10

## 2024-06-18 ASSESSMENT — PAIN DESCRIPTION - LOCATION: LOCATION: ABDOMEN;PELVIS

## 2024-06-18 NOTE — ED PROVIDER NOTES
0.0 - 0.5 K/UL   Comprehensive Metabolic Panel   Result Value Ref Range    Sodium 139 133 - 143 mmol/L    Potassium 4.0 3.5 - 5.1 mmol/L    Chloride 105 98 - 107 mmol/L    CO2 20 (L) 21 - 32 mmol/L    Anion Gap 14 (H) 2 - 11 mmol/L    Glucose 143 (H) 65 - 100 mg/dL    BUN 10 6 - 23 MG/DL    Creatinine 1.02 (H) 0.6 - 1.0 MG/DL    Est, Glom Filt Rate 76 >60 ml/min/1.73m2    Calcium 9.5 8.3 - 10.4 MG/DL    Total Bilirubin 0.2 0.2 - 1.1 MG/DL    ALT 6 (L) 13.0 - 61.0 U/L    AST 16 15 - 37 U/L    Alk Phosphatase 70 45.0 - 117.0 U/L    Total Protein 6.5 6.4 - 8.2 g/dL    Albumin 4.0 3.5 - 5.0 g/dL    Globulin 2.5 (L) 2.8 - 4.5 g/dL    Albumin/Globulin Ratio 1.6 0.4 - 1.6     Urinalysis with Reflex to Culture    Specimen: Urine   Result Value Ref Range    Color, UA YELLOW      Appearance CLEAR      Specific Gravity, UA 1.010 1.001 - 1.023      pH, Urine 6.5 5.0 - 9.0      Protein, UA Negative NEG mg/dL    Glucose, Ur Negative mg/dL    Ketones, Urine Negative NEG mg/dL    Bilirubin, Urine Negative NEG      Blood, Urine Negative NEG      Urobilinogen, Urine 0.2 0.2 - 1.0 EU/dL    Nitrite, Urine Negative NEG      Leukocyte Esterase, Urine Negative NEG      WBC, UA 0 0 /hpf    RBC, UA 0 0 /hpf    BACTERIA, URINE 0 0 /hpf    Urine Culture if Indicated CULTURE NOT INDICATED BY UA RESULT      Epithelial Cells, UA 0 0 /hpf    Casts 0 0 /lpf    Crystals 0 0 /LPF    Mucus, UA 0 0 /lpf    Other observations RESULTS VERIFIED MANUALLY     Lipase   Result Value Ref Range    Lipase 22 13 - 60 U/L   Pregnancy, Urine   Result Value Ref Range    Pregnancy, Urine Negative     Lactic Acid   Result Value Ref Range    Lactic Acid 2.4 (H) 0.5 - 2.0 mmol/L   HCG, Quantitative, Pregnancy   Result Value Ref Range    hCG Quant <1 0.0 - 6.0 MIU/ML         US PELVIS COMPLETE NON-OB TRANSABDOMINAL AND TRANSVAGINAL   Final Result   No findings of torsion      Enlarged bilateral ovaries with multiple small peripheral cysts. Correlate for   polycystic

## 2024-06-18 NOTE — ED NOTES
Patient mobility status  with no difficulty. Provider aware     I have reviewed discharge instructions with the patient.  The patient verbalized understanding.    Patient left ED via Discharge Method: ambulatory to Home with Significant Other and Parent.    Opportunity for questions and clarification provided.     Patient given 0 scripts.

## 2024-06-18 NOTE — PROGRESS NOTES
The patient is a 29 y.o. No obstetric history on file. who is seen for Pelvic Pain. Patient  was seen in ED yesterday and had imaging which showed an ovarian cyst, but was otherwise normal. Denies discharge/odor/itching. Denies n/v/d. Denies constipation. States went to work yesterday. States while she was at work had a sudden sharp shooting pain on RLQ. States pain still has not resolved. No evidence of torsion on imaging. Had normal CBC, mildly elevated lactic acid, normal UA, neg preg test/quant. States was advised to follow up with GYN. States taking Ibuprofen today, patient states pain is ongoing. Has been faithfully taking OCP.     States Cycles are Irregular; Cycle Length: 90 days; Duration: 14days. Patient states having severe pain during Menses.       CT of Abdomen Pelvis as of 06/18/2024    IMPRESSION:  1. No acute abnormality is evident in the abdomen or pelvis. There is no  obstructive uropathy or evidence of appendicitis or pyelonephritis.  2. A 2 x 2.5 cm simple appearing cyst has developed in the right ovary. Both  ovaries remain prominent in size.       US of Pelvis as of 06/18/2024:    FINDINGS:    - UTERUS: 6.3 x 3.4 x 4.0 cm in length.  Endometrial stripe measures 5.4 mm.  No  discrete myometrial or endometrial mass is seen. Small amount of endocervical  fluid likely physiologic. Nabothian cyst noted.     -  RIGHT OVARY: 5.8 x 3.4 x 5.3 cm.  No significant adnexal mass.  Normal blood  flow. Likely physiologic cyst arising from the right ovary measuring up to 2.7  cm. Multiple small peripheral cysts noted.     - LEFT OVARY: 4.8 x 2.7 x 3.0 cm.  No significant adnexal mass.  Normal blood  flow. Multiple small peripheral cysts noted.     No significant free fluid.     IMPRESSION:  No findings of torsion     Enlarged bilateral ovaries with multiple small peripheral cysts. Correlate for  polycystic ovarian syndrome.     Likely physiologic cyst arising from the right ovary.      HISTORY:    No

## 2024-06-18 NOTE — ED TRIAGE NOTES
Pt via wheelchair to triage for reports of sharp left lower pelvic pain. Pt states pain began suddenly approximately 1 hour prior to arrival. Pt reports pain has been constant. Pt denies any n/v/d. Pt denies vaginal discharge. LMP 2 weeks ago.

## 2024-06-18 NOTE — ED NOTES
Patient is on continuous pulse oximetry and cycling BP. Patient is resting in stretcher. Respirations are even and unlabored. Side rails x 1. Call light within reach. Area Clear of clutter. Patient received three blankets. Patient's significant other at bedside.  Patient and significant other aware of plan of care.

## 2024-06-19 ENCOUNTER — OFFICE VISIT (OUTPATIENT)
Dept: OBGYN CLINIC | Age: 29
End: 2024-06-19
Payer: COMMERCIAL

## 2024-06-19 VITALS
HEIGHT: 62 IN | DIASTOLIC BLOOD PRESSURE: 74 MMHG | WEIGHT: 165.5 LBS | SYSTOLIC BLOOD PRESSURE: 125 MMHG | BODY MASS INDEX: 30.46 KG/M2

## 2024-06-19 DIAGNOSIS — R10.31 RLQ ABDOMINAL PAIN: ICD-10-CM

## 2024-06-19 DIAGNOSIS — R10.2 PELVIC PAIN IN FEMALE: Primary | ICD-10-CM

## 2024-06-19 PROCEDURE — 99214 OFFICE O/P EST MOD 30 MIN: CPT | Performed by: NURSE PRACTITIONER

## 2024-06-19 RX ORDER — NAPROXEN 500 MG/1
500 TABLET ORAL 2 TIMES DAILY PRN
Qty: 180 TABLET | Refills: 1 | Status: SHIPPED | OUTPATIENT
Start: 2024-06-19

## 2024-07-22 ENCOUNTER — OFFICE VISIT (OUTPATIENT)
Dept: FAMILY MEDICINE CLINIC | Facility: CLINIC | Age: 29
End: 2024-07-22
Payer: COMMERCIAL

## 2024-07-22 VITALS
HEART RATE: 78 BPM | HEIGHT: 62 IN | BODY MASS INDEX: 30.59 KG/M2 | DIASTOLIC BLOOD PRESSURE: 79 MMHG | WEIGHT: 166.2 LBS | TEMPERATURE: 97.3 F | SYSTOLIC BLOOD PRESSURE: 118 MMHG

## 2024-07-22 DIAGNOSIS — I49.3 SYMPTOMATIC PVCS: ICD-10-CM

## 2024-07-22 DIAGNOSIS — Z00.00 ANNUAL PHYSICAL EXAM: ICD-10-CM

## 2024-07-22 DIAGNOSIS — G43.909 MIGRAINE WITHOUT STATUS MIGRAINOSUS, NOT INTRACTABLE, UNSPECIFIED MIGRAINE TYPE: ICD-10-CM

## 2024-07-22 DIAGNOSIS — Z00.00 ANNUAL PHYSICAL EXAM: Primary | ICD-10-CM

## 2024-07-22 DIAGNOSIS — Z11.3 SCREENING FOR STDS (SEXUALLY TRANSMITTED DISEASES): ICD-10-CM

## 2024-07-22 DIAGNOSIS — F41.1 GAD (GENERALIZED ANXIETY DISORDER): ICD-10-CM

## 2024-07-22 DIAGNOSIS — G44.209 TENSION-TYPE HEADACHE, NOT INTRACTABLE, UNSPECIFIED CHRONICITY PATTERN: ICD-10-CM

## 2024-07-22 PROBLEM — N92.1 MENOMETRORRHAGIA: Status: ACTIVE | Noted: 2022-10-20

## 2024-07-22 PROBLEM — R07.9 CHEST PAIN AT REST: Status: RESOLVED | Noted: 2018-05-04 | Resolved: 2024-07-22

## 2024-07-22 PROBLEM — E28.2 PCOS (POLYCYSTIC OVARIAN SYNDROME): Status: ACTIVE | Noted: 2024-07-22

## 2024-07-22 PROBLEM — J18.9 PNEUMONIA OF LEFT UPPER LOBE DUE TO INFECTIOUS ORGANISM: Status: RESOLVED | Noted: 2022-10-20 | Resolved: 2024-07-22

## 2024-07-22 LAB
ALBUMIN SERPL-MCNC: 4 G/DL (ref 3.5–5)
ALBUMIN/GLOB SERPL: 1.4 (ref 1–1.9)
ALP SERPL-CCNC: 70 U/L (ref 35–104)
ALT SERPL-CCNC: 7 U/L (ref 12–65)
ANION GAP SERPL CALC-SCNC: 10 MMOL/L (ref 9–18)
AST SERPL-CCNC: 23 U/L (ref 15–37)
BASOPHILS # BLD: 0 K/UL (ref 0–0.2)
BASOPHILS NFR BLD: 0 % (ref 0–2)
BILIRUB SERPL-MCNC: 0.4 MG/DL (ref 0–1.2)
BUN SERPL-MCNC: 8 MG/DL (ref 6–23)
CALCIUM SERPL-MCNC: 9.5 MG/DL (ref 8.8–10.2)
CHLORIDE SERPL-SCNC: 104 MMOL/L (ref 98–107)
CHOLEST SERPL-MCNC: 160 MG/DL (ref 0–200)
CO2 SERPL-SCNC: 25 MMOL/L (ref 20–28)
CREAT SERPL-MCNC: 1 MG/DL (ref 0.6–1.1)
DIFFERENTIAL METHOD BLD: ABNORMAL
EOSINOPHIL # BLD: 0 K/UL (ref 0–0.8)
EOSINOPHIL NFR BLD: 0 % (ref 0.5–7.8)
ERYTHROCYTE [DISTWIDTH] IN BLOOD BY AUTOMATED COUNT: 14.2 % (ref 11.9–14.6)
GLOBULIN SER CALC-MCNC: 2.9 G/DL (ref 2.3–3.5)
GLUCOSE SERPL-MCNC: 78 MG/DL (ref 70–99)
HCT VFR BLD AUTO: 39.8 % (ref 35.8–46.3)
HDLC SERPL-MCNC: 61 MG/DL (ref 40–60)
HDLC SERPL: 2.6 (ref 0–5)
HGB BLD-MCNC: 13.2 G/DL (ref 11.7–15.4)
HIV 1+2 AB+HIV1 P24 AG SERPL QL IA: NONREACTIVE
HIV 1/2 RESULT COMMENT: NORMAL
IMM GRANULOCYTES # BLD AUTO: 0 K/UL (ref 0–0.5)
IMM GRANULOCYTES NFR BLD AUTO: 0 % (ref 0–5)
LDLC SERPL CALC-MCNC: 86 MG/DL (ref 0–100)
LYMPHOCYTES # BLD: 2.7 K/UL (ref 0.5–4.6)
LYMPHOCYTES NFR BLD: 30 % (ref 13–44)
MCH RBC QN AUTO: 28.9 PG (ref 26.1–32.9)
MCHC RBC AUTO-ENTMCNC: 33.2 G/DL (ref 31.4–35)
MCV RBC AUTO: 87.1 FL (ref 82–102)
MONOCYTES # BLD: 0.6 K/UL (ref 0.1–1.3)
MONOCYTES NFR BLD: 6 % (ref 4–12)
NEUTS SEG # BLD: 5.9 K/UL (ref 1.7–8.2)
NEUTS SEG NFR BLD: 64 % (ref 43–78)
NRBC # BLD: 0 K/UL (ref 0–0.2)
PLATELET # BLD AUTO: 371 K/UL (ref 150–450)
PMV BLD AUTO: 10.8 FL (ref 9.4–12.3)
POTASSIUM SERPL-SCNC: 3.8 MMOL/L (ref 3.5–5.1)
PROT SERPL-MCNC: 7 G/DL (ref 6.3–8.2)
RBC # BLD AUTO: 4.57 M/UL (ref 4.05–5.2)
SODIUM SERPL-SCNC: 139 MMOL/L (ref 136–145)
T PALLIDUM AB SER QL IA: NONREACTIVE
TRIGL SERPL-MCNC: 66 MG/DL (ref 0–150)
TSH W FREE THYROID IF ABNORMAL: 1.09 UIU/ML (ref 0.27–4.2)
VLDLC SERPL CALC-MCNC: 13 MG/DL (ref 6–23)
WBC # BLD AUTO: 9.3 K/UL (ref 4.3–11.1)

## 2024-07-22 PROCEDURE — 90715 TDAP VACCINE 7 YRS/> IM: CPT | Performed by: FAMILY MEDICINE

## 2024-07-22 PROCEDURE — 99214 OFFICE O/P EST MOD 30 MIN: CPT | Performed by: FAMILY MEDICINE

## 2024-07-22 PROCEDURE — 99395 PREV VISIT EST AGE 18-39: CPT | Performed by: FAMILY MEDICINE

## 2024-07-22 PROCEDURE — 90471 IMMUNIZATION ADMIN: CPT | Performed by: FAMILY MEDICINE

## 2024-07-22 RX ORDER — ESCITALOPRAM OXALATE 10 MG/1
10 TABLET ORAL DAILY
Qty: 100 TABLET | Refills: 0 | Status: SHIPPED | OUTPATIENT
Start: 2024-07-22

## 2024-07-22 RX ORDER — BUTALBITAL, ACETAMINOPHEN AND CAFFEINE 300; 40; 50 MG/1; MG/1; MG/1
1 CAPSULE ORAL 3 TIMES DAILY PRN
Qty: 90 CAPSULE | Refills: 2 | Status: SHIPPED | OUTPATIENT
Start: 2024-07-22

## 2024-07-22 RX ORDER — PROPRANOLOL HCL 60 MG
60 CAPSULE, EXTENDED RELEASE 24HR ORAL
Qty: 100 CAPSULE | Refills: 0 | Status: SHIPPED | OUTPATIENT
Start: 2024-07-22

## 2024-07-22 ASSESSMENT — PATIENT HEALTH QUESTIONNAIRE - PHQ9
SUM OF ALL RESPONSES TO PHQ9 QUESTIONS 1 & 2: 0
1. LITTLE INTEREST OR PLEASURE IN DOING THINGS: NOT AT ALL
2. FEELING DOWN, DEPRESSED OR HOPELESS: NOT AT ALL

## 2024-07-22 ASSESSMENT — ENCOUNTER SYMPTOMS
DIARRHEA: 0
CONSTIPATION: 0
BLOOD IN STOOL: 0
SHORTNESS OF BREATH: 0
ABDOMINAL PAIN: 0
COLOR CHANGE: 0

## 2024-07-22 NOTE — ASSESSMENT & PLAN NOTE
Problem and/or Symptoms are new to provider. Will have patient follow up as directed and make the following plan for further evaluation and/or treatment:    Not Stable: same plan as for Migraines plus Fioricet TID PRN

## 2024-07-22 NOTE — ASSESSMENT & PLAN NOTE
Problem and/or Symptoms are new to provider. Will have patient follow up as directed and make the following plan for further evaluation and/or treatment:    Not Stable: same plan as for Migraines & TTH

## 2024-07-22 NOTE — PATIENT INSTRUCTIONS
I am checking one or more labs today related to your health concerns and if any results require a change in your treatment regimen you will be notified right away.    - Continue taking all the medications on this list as directed; this list is current and please discontinue any medications not on this list. Please call the office or use \"My Chart\" online to request medication refills prior to running out.    - PLEASE NOTE: when needing to schedule a visit you should send our office a [employer] message requesting a visit (or call the office if you can't use [employer]) and we will send you a scheduling ticket that will include open dates & times. You can then use this feature to select your preferred date & time for an office visit, virtual visit, and/or lab visit. Please make sure to respond to this scheduling ticket ASAP when received.     Please do NOT schedule any visit through the [employer] self-scheduling feature; any self-scheduling should only be done using the scheduling ticket provided to you directly from our office. Also, please do NOT schedule any visits through our out of state call center. If you ever need to speak to someone at our office directly please follow these instructions: when you call our office number, [phone], and get the phone tree options, press \"Option 2\" first & then \"Option 1\". This combination should take you directly to someone at our office and bypass the out of state call center.

## 2024-07-22 NOTE — ASSESSMENT & PLAN NOTE
Problem and/or Symptoms are new to provider. Will have patient follow up as directed and make the following plan for further evaluation and/or treatment:    Not Stable: starting Lexapro 10 mg QD

## 2024-07-22 NOTE — ASSESSMENT & PLAN NOTE
Problem and/or Symptoms are new to provider. Will have patient follow up as directed and make the following plan for further evaluation and/or treatment:    Not Stable: starting Propranolol LA 60 mg QHS

## 2024-07-26 LAB
C TRACH RRNA SPEC QL NAA+PROBE: NEGATIVE
N GONORRHOEA RRNA SPEC QL NAA+PROBE: NEGATIVE
SPECIMEN SOURCE: NORMAL
T VAGINALIS RRNA SPEC QL NAA+PROBE: NEGATIVE

## 2024-07-30 NOTE — PROGRESS NOTES
4.0 cm in length.  Endometrial stripe measures 5.4 mm.  No  discrete myometrial or endometrial mass is seen. Small amount of endocervical  fluid likely physiologic. Nabothian cyst noted.  -  RIGHT OVARY: 5.8 x 3.4 x 5.3 cm.  No significant adnexal mass.  Normal blood  flow. Likely physiologic cyst arising from the right ovary measuring up to 2.7  cm. Multiple small peripheral cysts noted.  - LEFT OVARY: 4.8 x 2.7 x 3.0 cm.  No significant adnexal mass.  Normal blood  flow. Multiple small peripheral cysts noted.  No significant free fluid.     IMPRESSION:  No findings of torsion  Enlarged bilateral ovaries with multiple small peripheral cysts. Correlate for  polycystic ovarian syndrome.  Likely physiologic cyst arising from the right ovary.        HISTORY:  No obstetric history on file.  Patient's last menstrual period was 07/02/2024 (exact date).  Current Outpatient Medications on File Prior to Visit   Medication Sig Dispense Refill    butalbital-APAP-caffeine -40 MG CAPS per capsule Take 1 capsule by mouth 3 times daily as needed for Headaches 90 capsule 2    escitalopram (LEXAPRO) 10 MG tablet Take 1 tablet by mouth daily 100 tablet 0    propranolol (INDERAL LA) 60 MG extended release capsule Take 1 capsule by mouth nightly 100 capsule 0    naproxen (NAPROSYN) 500 MG tablet Take 1 tablet by mouth 2 times daily as needed for Pain 180 tablet 1    vitamin D (ERGOCALCIFEROL) 1.25 MG (88702 UT) CAPS capsule Take 1 capsule by mouth once a week 4 capsule 1     No current facility-administered medications on file prior to visit.       ROS:  Feeling well. No dyspnea or chest pain on exertion.  No abdominal pain, change in bowel habits, black or bloody stools.  No urinary tract symptoms. GYN ROS: see above.    PHYSICAL EXAM:  Blood pressure 120/68, height 1.575 m (5' 2\"), weight 76.2 kg (168 lb), last menstrual period 07/02/2024.    The patient appears well, alert, oriented x 3, in no distress.    ABDOMEN: soft/RLQ

## 2024-07-31 ENCOUNTER — PROCEDURE VISIT (OUTPATIENT)
Dept: OBGYN CLINIC | Age: 29
End: 2024-07-31
Payer: COMMERCIAL

## 2024-07-31 ENCOUNTER — OFFICE VISIT (OUTPATIENT)
Dept: OBGYN CLINIC | Age: 29
End: 2024-07-31
Payer: COMMERCIAL

## 2024-07-31 VITALS
HEIGHT: 62 IN | WEIGHT: 168 LBS | SYSTOLIC BLOOD PRESSURE: 120 MMHG | BODY MASS INDEX: 30.91 KG/M2 | DIASTOLIC BLOOD PRESSURE: 68 MMHG

## 2024-07-31 DIAGNOSIS — R10.31 RLQ ABDOMINAL PAIN: ICD-10-CM

## 2024-07-31 DIAGNOSIS — E28.2 PCOS (POLYCYSTIC OVARIAN SYNDROME): ICD-10-CM

## 2024-07-31 DIAGNOSIS — N92.1 MENORRHAGIA WITH IRREGULAR CYCLE: ICD-10-CM

## 2024-07-31 DIAGNOSIS — N83.201 CYST OF RIGHT OVARY: Primary | ICD-10-CM

## 2024-07-31 DIAGNOSIS — N83.201 OVARIAN CYST, BILATERAL: ICD-10-CM

## 2024-07-31 DIAGNOSIS — N83.202 OVARIAN CYST, BILATERAL: ICD-10-CM

## 2024-07-31 DIAGNOSIS — E55.9 VITAMIN D DEFICIENCY: Primary | ICD-10-CM

## 2024-07-31 DIAGNOSIS — R10.2 PELVIC PAIN IN FEMALE: ICD-10-CM

## 2024-07-31 DIAGNOSIS — N94.6 DYSMENORRHEA: ICD-10-CM

## 2024-07-31 LAB — 25(OH)D3 SERPL-MCNC: 18 NG/ML (ref 30–100)

## 2024-07-31 PROCEDURE — 99214 OFFICE O/P EST MOD 30 MIN: CPT | Performed by: NURSE PRACTITIONER

## 2024-07-31 PROCEDURE — 76830 TRANSVAGINAL US NON-OB: CPT | Performed by: OBSTETRICS & GYNECOLOGY

## 2024-07-31 RX ORDER — NORETHINDRONE ACETATE AND ETHINYL ESTRADIOL AND FERROUS FUMARATE 1MG-20(24)
1 KIT ORAL DAILY
Qty: 3 PACKET | Refills: 3 | Status: SHIPPED | OUTPATIENT
Start: 2024-07-31

## 2024-08-01 ENCOUNTER — OFFICE VISIT (OUTPATIENT)
Dept: OBGYN CLINIC | Age: 29
End: 2024-08-01
Payer: COMMERCIAL

## 2024-08-01 VITALS — BODY MASS INDEX: 30.62 KG/M2 | HEIGHT: 62 IN | WEIGHT: 166.4 LBS

## 2024-08-01 DIAGNOSIS — N94.6 DYSMENORRHEA: ICD-10-CM

## 2024-08-01 DIAGNOSIS — E28.2 PCOS (POLYCYSTIC OVARIAN SYNDROME): Primary | ICD-10-CM

## 2024-08-01 DIAGNOSIS — N92.6 IRREGULAR MENSES: ICD-10-CM

## 2024-08-01 PROCEDURE — 99214 OFFICE O/P EST MOD 30 MIN: CPT | Performed by: OBSTETRICS & GYNECOLOGY

## 2024-08-01 RX ORDER — IBUPROFEN 600 MG/1
600 TABLET ORAL 4 TIMES DAILY PRN
Qty: 40 TABLET | Refills: 0 | Status: SHIPPED | OUTPATIENT
Start: 2024-08-01

## 2024-08-02 RX ORDER — ERGOCALCIFEROL 1.25 MG/1
50000 CAPSULE ORAL WEEKLY
Qty: 16 CAPSULE | Refills: 0 | Status: SHIPPED | OUTPATIENT
Start: 2024-08-02

## 2024-10-01 NOTE — PROGRESS NOTES
capsule 2    escitalopram (LEXAPRO) 10 MG tablet Take 1 tablet by mouth daily 100 tablet 0    propranolol (INDERAL LA) 60 MG extended release capsule Take 1 capsule by mouth nightly 100 capsule 0    naproxen (NAPROSYN) 500 MG tablet Take 1 tablet by mouth 2 times daily as needed for Pain 180 tablet 1    vitamin D (ERGOCALCIFEROL) 1.25 MG (42155 UT) CAPS capsule Take 1 capsule by mouth once a week 4 capsule 1     No current facility-administered medications on file prior to visit.       ROS:  Feeling well. No dyspnea or chest pain on exertion.  No abdominal pain, change in bowel habits, black or bloody stools.  No urinary tract symptoms. GYN ROS: see HPI.    PHYSICAL EXAM:  Blood pressure 116/64, weight 75.6 kg (166 lb 11.2 oz), last menstrual period 09/08/2024.  Not indicated.    ASSESSMENT:    1. PCOS (polycystic ovarian syndrome)  2. Cyst of right ovary  3. Menorrhagia with irregular cycle       PLAN:  Ovarian cyst has spontaneously resolved.    Adenomyosis prevalent, likely etiology of menorrhagia and dysmenorrhea, not improved with Junel.  I have recommended desogen over the next 2-3 cycles to see if it improves.  When pregnancy desired, she is counseled she should contact me to discuss how to facilitate before stopping OCP's.          Herbie Betancourt MD

## 2024-10-03 ENCOUNTER — OFFICE VISIT (OUTPATIENT)
Dept: OBGYN CLINIC | Age: 29
End: 2024-10-03
Payer: COMMERCIAL

## 2024-10-03 ENCOUNTER — PROCEDURE VISIT (OUTPATIENT)
Dept: OBGYN CLINIC | Age: 29
End: 2024-10-03
Payer: COMMERCIAL

## 2024-10-03 VITALS — WEIGHT: 166.7 LBS | BODY MASS INDEX: 30.49 KG/M2 | SYSTOLIC BLOOD PRESSURE: 116 MMHG | DIASTOLIC BLOOD PRESSURE: 64 MMHG

## 2024-10-03 DIAGNOSIS — E28.2 PCOS (POLYCYSTIC OVARIAN SYNDROME): Primary | ICD-10-CM

## 2024-10-03 DIAGNOSIS — N83.201 CYST OF RIGHT OVARY: ICD-10-CM

## 2024-10-03 DIAGNOSIS — N92.1 MENORRHAGIA WITH IRREGULAR CYCLE: ICD-10-CM

## 2024-10-03 DIAGNOSIS — R10.2 PELVIC PAIN IN FEMALE: Primary | ICD-10-CM

## 2024-10-03 PROCEDURE — 99214 OFFICE O/P EST MOD 30 MIN: CPT | Performed by: OBSTETRICS & GYNECOLOGY

## 2024-10-03 PROCEDURE — 76830 TRANSVAGINAL US NON-OB: CPT | Performed by: OBSTETRICS & GYNECOLOGY

## 2024-10-03 RX ORDER — DESOGESTREL AND ETHINYL ESTRADIOL 0.15-0.03
1 KIT ORAL DAILY
Qty: 1 PACKET | Refills: 3 | Status: SHIPPED | OUTPATIENT
Start: 2024-10-03

## 2024-10-22 ENCOUNTER — OFFICE VISIT (OUTPATIENT)
Dept: FAMILY MEDICINE CLINIC | Facility: CLINIC | Age: 29
End: 2024-10-22
Payer: COMMERCIAL

## 2024-10-22 VITALS
SYSTOLIC BLOOD PRESSURE: 119 MMHG | BODY MASS INDEX: 30.69 KG/M2 | WEIGHT: 166.8 LBS | HEIGHT: 62 IN | DIASTOLIC BLOOD PRESSURE: 77 MMHG | TEMPERATURE: 97.7 F | HEART RATE: 89 BPM

## 2024-10-22 DIAGNOSIS — G44.209 TENSION-TYPE HEADACHE, NOT INTRACTABLE, UNSPECIFIED CHRONICITY PATTERN: ICD-10-CM

## 2024-10-22 DIAGNOSIS — F41.1 GAD (GENERALIZED ANXIETY DISORDER): ICD-10-CM

## 2024-10-22 DIAGNOSIS — G43.909 MIGRAINE WITHOUT STATUS MIGRAINOSUS, NOT INTRACTABLE, UNSPECIFIED MIGRAINE TYPE: ICD-10-CM

## 2024-10-22 DIAGNOSIS — K21.9 GASTROESOPHAGEAL REFLUX DISEASE, UNSPECIFIED WHETHER ESOPHAGITIS PRESENT: Primary | ICD-10-CM

## 2024-10-22 PROCEDURE — 99214 OFFICE O/P EST MOD 30 MIN: CPT | Performed by: FAMILY MEDICINE

## 2024-10-22 RX ORDER — FAMOTIDINE 40 MG/1
40 TABLET, FILM COATED ORAL 2 TIMES DAILY PRN
Qty: 60 TABLET | Refills: 2 | Status: SHIPPED | OUTPATIENT
Start: 2024-10-22

## 2024-10-22 RX ORDER — ESCITALOPRAM OXALATE 20 MG/1
20 TABLET ORAL DAILY
Qty: 100 TABLET | Refills: 0 | Status: SHIPPED | OUTPATIENT
Start: 2024-10-22

## 2024-10-22 RX ORDER — PROPRANOLOL HYDROCHLORIDE 60 MG/1
60 CAPSULE, EXTENDED RELEASE ORAL
Qty: 100 CAPSULE | Refills: 0 | Status: SHIPPED | OUTPATIENT
Start: 2024-10-22

## 2024-10-22 RX ORDER — BUTALBITAL, ACETAMINOPHEN AND CAFFEINE 300; 40; 50 MG/1; MG/1; MG/1
1 CAPSULE ORAL 3 TIMES DAILY PRN
Qty: 90 CAPSULE | Refills: 2 | Status: SHIPPED | OUTPATIENT
Start: 2024-10-22

## 2024-10-22 RX ORDER — ERGOCALCIFEROL 1.25 MG/1
50000 CAPSULE, LIQUID FILLED ORAL WEEKLY
Qty: 12 CAPSULE | Refills: 1 | OUTPATIENT
Start: 2024-10-22

## 2024-10-22 NOTE — ASSESSMENT & PLAN NOTE
Problem and/or Symptoms are currently not stable and/or well controlled on current treatment plan. Will have patient follow up as directed and make the following changes for further evaluation and/or treatment:     Increasing Lexapro from 10 to 20 mg; f/u response in 3 M

## 2024-10-22 NOTE — ASSESSMENT & PLAN NOTE
Problem and/or Symptoms are currently not stable and/or well controlled on current treatment plan. Will have patient follow up as directed and make the following changes for further evaluation and/or treatment:     Same plan as for Migraines

## 2024-10-22 NOTE — PROGRESS NOTES
[place] [address]  Phone: [phone]  Fax: [phone]  Email: [email or web address]      Encounter Info  Tamela Martinez; Established patient 29 y.o.female; seen 10/22/2024 for: Anxiety (Chest pains are returning- 2 mths- off & on happens at home & work), Headache (On the right side- month &½), and Upper Abdominal Pain (Started like 3 weeks )      Assessment & Plan    1. Gastroesophageal reflux disease, unspecified whether esophagitis present  Assessment & Plan:  Problem and/or Symptoms are currently not stable and/or well controlled on current treatment plan. Will have patient follow up as directed and make the following changes for further evaluation and/or treatment:     Starting Pepcid 40 mg BID PRN  Orders:  -     famotidine (PEPCID) 40 MG tablet; Take 1 tablet by mouth 2 times daily as needed (heartburn), Disp-60 tablet, R-2Normal  2. JV (generalized anxiety disorder)  Assessment & Plan:  Problem and/or Symptoms are currently not stable and/or well controlled on current treatment plan. Will have patient follow up as directed and make the following changes for further evaluation and/or treatment:     Increasing Lexapro from 10 to 20 mg; f/u response in 3 M  Orders:  -     escitalopram (LEXAPRO) 20 MG tablet; Take 1 tablet by mouth daily, Disp-100 tablet, R-0Normal  -     propranolol (INDERAL LA) 60 MG extended release capsule; Take 1 capsule by mouth nightly, Disp-100 capsule, R-0Normal  3. Tension-type headache, not intractable, unspecified chronicity pattern  Assessment & Plan:  Problem and/or Symptoms are currently not stable and/or well controlled on current treatment plan. Will have patient follow up as directed and make the following changes for further evaluation and/or treatment:     Same plan as for Migraines   Orders:  -     propranolol (INDERAL LA) 60 MG extended release capsule; Take 1 capsule by mouth nightly, Disp-100 capsule,

## 2024-10-22 NOTE — ASSESSMENT & PLAN NOTE
Problem and/or Symptoms are currently not stable and/or well controlled on current treatment plan. Will have patient follow up as directed and make the following changes for further evaluation and/or treatment:     Cont BB QHS regimen & advised pt to take th Fioricet PRN

## 2024-10-22 NOTE — ASSESSMENT & PLAN NOTE
Problem and/or Symptoms are currently not stable and/or well controlled on current treatment plan. Will have patient follow up as directed and make the following changes for further evaluation and/or treatment:     Starting Pepcid 40 mg BID PRN

## 2024-11-15 DIAGNOSIS — N94.6 DYSMENORRHEA: Primary | ICD-10-CM

## 2024-11-23 RX ORDER — KETOROLAC TROMETHAMINE 10 MG/1
10 TABLET, FILM COATED ORAL EVERY 6 HOURS PRN
Qty: 20 TABLET | Refills: 0 | Status: SHIPPED | OUTPATIENT
Start: 2024-11-23 | End: 2025-11-23

## 2024-12-02 NOTE — PROGRESS NOTES
The patient is a 29 y.o. who is seen for med recheck.    Last seen 10/3/24 and started on Apri due to adenomyosis, menorhagia, and dysmenorrhea. Pt messaged 11/14/24 with reports of cramping and had Toradol prn prescribed.    Pt reports cramps are better with Toradol but her cycle started on Nov 13th and is still on at this time. She has not missed/skipped any doses of Apri     HISTORY:    No obstetric history on file.  Patient's last menstrual period was 11/13/2024 (exact date).    Current Outpatient Medications on File Prior to Visit   Medication Sig Dispense Refill    ketorolac (TORADOL) 10 MG tablet Take 1 tablet by mouth every 6 hours as needed for Pain 20 tablet 0    escitalopram (LEXAPRO) 20 MG tablet Take 1 tablet by mouth daily 100 tablet 0    propranolol (INDERAL LA) 60 MG extended release capsule Take 1 capsule by mouth nightly 100 capsule 0    butalbital-APAP-caffeine -40 MG CAPS per capsule Take 1 capsule by mouth 3 times daily as needed for Headaches 90 capsule 2    famotidine (PEPCID) 40 MG tablet Take 1 tablet by mouth 2 times daily as needed (heartburn) 60 tablet 2    desogestrel-ethinyl estradiol (APRI) 0.15-30 MG-MCG per tablet Take 1 tablet by mouth daily 1 packet 3    vitamin D (ERGOCALCIFEROL) 1.25 MG (09838 UT) CAPS capsule Take 1 capsule by mouth once a week 16 capsule 0    ibuprofen (ADVIL;MOTRIN) 600 MG tablet Take 1 tablet by mouth 4 times daily as needed for Pain 40 tablet 0     No current facility-administered medications on file prior to visit.       ROS:  Feeling well. No dyspnea or chest pain on exertion.  No abdominal pain, change in bowel habits, black or bloody stools.  No urinary tract symptoms. GYN ROS: See HPI.    PHYSICAL EXAM:  Blood pressure 116/76, height 1.575 m (5' 2\"), weight 77.1 kg (169 lb 14.4 oz), last menstrual period 11/13/2024.    The patient appears well, alert, oriented x 3, in no distress.  ASSESSMENT:    1. PCOS (polycystic ovarian syndrome)  2.

## 2024-12-03 ENCOUNTER — OFFICE VISIT (OUTPATIENT)
Dept: OBGYN CLINIC | Age: 29
End: 2024-12-03
Payer: COMMERCIAL

## 2024-12-03 VITALS
DIASTOLIC BLOOD PRESSURE: 76 MMHG | WEIGHT: 169.9 LBS | BODY MASS INDEX: 31.27 KG/M2 | SYSTOLIC BLOOD PRESSURE: 116 MMHG | HEIGHT: 62 IN

## 2024-12-03 DIAGNOSIS — N92.1 MENORRHAGIA WITH IRREGULAR CYCLE: ICD-10-CM

## 2024-12-03 DIAGNOSIS — E28.2 PCOS (POLYCYSTIC OVARIAN SYNDROME): Primary | ICD-10-CM

## 2024-12-03 DIAGNOSIS — N94.6 DYSMENORRHEA: ICD-10-CM

## 2024-12-03 PROCEDURE — 99214 OFFICE O/P EST MOD 30 MIN: CPT | Performed by: OBSTETRICS & GYNECOLOGY

## 2024-12-03 RX ORDER — MISOPROSTOL 200 UG/1
200 TABLET ORAL 2 TIMES DAILY
Qty: 2 TABLET | Refills: 0 | Status: SHIPPED | OUTPATIENT
Start: 2024-12-03

## 2025-01-22 NOTE — PROGRESS NOTES
Mirena IUD Insertion          Name:  Tamela Martinez    :  1995 Age:  29 y.o.      Contraceptive method:  OCP (estrogen/progesterone)  LMP:  No LMP recorded.  UCG:No components found for: \"HCGURQLPOC\"   IUD Lot # [id number] exp date     Tamela Martinez has not had intercourse in the last 2 weeks    Procedure:  Patient was counseled regarding 99% efficacy, risk of irregular bleeding for the first 6 months following insertion, small risk or expulsion or uterine perforation with need for surgical removal.  Consent form is signed.    Speculum was inserted to visualize the cervix.  Cervix was cleansed with zephrine, sterile gloves were donned, and a single-toothed tenaculum applied to the anterior lip of the cervix. The os was stenotic, only visible because of menstrual flow coming out.  After several minutes with tip of a tenaculum required to get the os open enough for a dilator, the Cervix was dilated and sounded to 7 cm.  The cervical isthmus remained too rigid to pass the Mirena IUD to the fundus, despite serial dilation with silastic dilators.  Patient tolerated the procedure for 20 minutes but when I could not successfully pass the introducer to the fundus, the procedure was terminated in favor of another round of cytotec.  We will bring her back to attempt with another device when available.  She will remain abstinent until device can be inserted.      Patient tolerated the failed procedure well.

## 2025-01-23 ENCOUNTER — OFFICE VISIT (OUTPATIENT)
Dept: OBGYN CLINIC | Age: 30
End: 2025-01-23

## 2025-01-23 VITALS
SYSTOLIC BLOOD PRESSURE: 106 MMHG | WEIGHT: 174.8 LBS | DIASTOLIC BLOOD PRESSURE: 78 MMHG | BODY MASS INDEX: 32.17 KG/M2 | HEIGHT: 62 IN

## 2025-01-23 DIAGNOSIS — Z30.431 ENCOUNTER FOR MANAGEMENT OF INTRAUTERINE CONTRACEPTIVE DEVICE (IUD), UNSPECIFIED IUD MANAGEMENT TYPE: Primary | ICD-10-CM

## 2025-01-23 PROCEDURE — 99024 POSTOP FOLLOW-UP VISIT: CPT | Performed by: OBSTETRICS & GYNECOLOGY

## 2025-01-23 RX ORDER — MISOPROSTOL 200 UG/1
200 TABLET ORAL 2 TIMES DAILY
Qty: 4 TABLET | Refills: 0 | Status: SHIPPED | OUTPATIENT
Start: 2025-01-23

## 2025-05-12 NOTE — PROGRESS NOTES
Diabetes Maternal Aunt     Asthma Maternal Aunt     Diabetes Maternal Aunt     Psoriasis Maternal Aunt     Diabetes Maternal Aunt     Hearing Loss Maternal Aunt     Diabetes Maternal Cousin     Miscarriages / Stillbirths Maternal Cousin     Gout Maternal Uncle     Miscarriages / Stillbirths Maternal Aunt     Stroke Maternal Uncle        Review of Systems - General ROS: negative except for that discussed in HPI      ROS:  Feeling well. No dyspnea or chest pain on exertion.  No abdominal pain, change in bowel habits, black or bloody stools.  No urinary tract symptoms. No neurological complaints.    Objective:   /80   Wt 78.6 kg (173 lb 4.8 oz)   LMP 04/20/2025 (Exact Date)   BMI 31.70 kg/m²     Results for orders placed or performed in visit on 05/15/25   AMB POC URINALYSIS DIP STICK MANUAL W/O MICRO   Result Value Ref Range    Color (UA POC) Yellow     Clarity (UA POC) Clear     Glucose, Urine, POC Negative     Bilirubin, Urine, POC Negative     Ketones, Urine, POC Negative     Specific Gravity, Urine, POC 1.015 1.001 - 1.035    Blood (UA POC) Negative     pH, Urine, POC 6 4.6 - 8.0    Protein, Urine, POC Negative     Urobilinogen, POC 0.2 mg/dL <1.1 mg/dL    Nitrite, Urine, POC Negative Negative    Leukocyte Esterase, Urine, POC Negative         The patient appears well, alert, oriented x 3, in no distress.  ENT normal.  Neck supple. No adenopathy or thyromegaly.   Lungs:  clear, good air entry, no wheezes, rhonchi or rales.   Heart:  S1 and S2 normal, no murmurs, regular rate and rhythm.  Abdomen:  soft without tenderness, guarding, mass or organomegaly.   Extremities show no edema, normal peripheral pulses.   Neurological is normal, no focal findings.    BREAST EXAM: breasts appear normal, no suspicious masses, no skin or nipple changes or axillary nodes    PELVIC EXAM: External genitalia is within normal limits, urethra, urethra meatus and bladder are midline well supported. Vagina is well rugated,

## 2025-05-15 ENCOUNTER — OFFICE VISIT (OUTPATIENT)
Dept: OBGYN CLINIC | Age: 30
End: 2025-05-15
Payer: COMMERCIAL

## 2025-05-15 VITALS — WEIGHT: 173.3 LBS | DIASTOLIC BLOOD PRESSURE: 80 MMHG | BODY MASS INDEX: 31.7 KG/M2 | SYSTOLIC BLOOD PRESSURE: 126 MMHG

## 2025-05-15 DIAGNOSIS — Z12.4 ENCOUNTER FOR SCREENING FOR CERVICAL CANCER: ICD-10-CM

## 2025-05-15 DIAGNOSIS — Z13.89 SCREENING FOR GENITOURINARY CONDITION: ICD-10-CM

## 2025-05-15 DIAGNOSIS — Z01.419 WELL WOMAN EXAM: Primary | ICD-10-CM

## 2025-05-15 DIAGNOSIS — E28.2 PCOS (POLYCYSTIC OVARIAN SYNDROME): ICD-10-CM

## 2025-05-15 DIAGNOSIS — Z11.51 SCREENING FOR HPV (HUMAN PAPILLOMAVIRUS): ICD-10-CM

## 2025-05-15 DIAGNOSIS — Z11.3 SCREENING FOR STD (SEXUALLY TRANSMITTED DISEASE): ICD-10-CM

## 2025-05-15 LAB
BILIRUBIN, URINE, POC: NEGATIVE
BLOOD URINE, POC: NEGATIVE
GLUCOSE URINE, POC: NEGATIVE
KETONES, URINE, POC: NEGATIVE
LEUKOCYTE ESTERASE, URINE, POC: NEGATIVE
NITRITE, URINE, POC: NEGATIVE
PH, URINE, POC: 6 (ref 4.6–8)
PROTEIN,URINE, POC: NEGATIVE
SPECIFIC GRAVITY, URINE, POC: 1.01 (ref 1–1.03)
URINALYSIS CLARITY, POC: CLEAR
URINALYSIS COLOR, POC: YELLOW
UROBILINOGEN, POC: NORMAL MG/DL

## 2025-05-15 PROCEDURE — 99395 PREV VISIT EST AGE 18-39: CPT | Performed by: OBSTETRICS & GYNECOLOGY

## 2025-05-15 PROCEDURE — 99459 PELVIC EXAMINATION: CPT | Performed by: OBSTETRICS & GYNECOLOGY

## 2025-05-15 PROCEDURE — 81002 URINALYSIS NONAUTO W/O SCOPE: CPT | Performed by: OBSTETRICS & GYNECOLOGY

## 2025-05-15 RX ORDER — LETROZOLE 2.5 MG/1
2.5 TABLET, FILM COATED ORAL 2 TIMES DAILY
Qty: 10 TABLET | Refills: 5 | Status: SHIPPED | OUTPATIENT
Start: 2025-05-15

## 2025-05-21 ENCOUNTER — OFFICE VISIT (OUTPATIENT)
Dept: FAMILY MEDICINE CLINIC | Facility: CLINIC | Age: 30
End: 2025-05-21
Payer: COMMERCIAL

## 2025-05-21 VITALS
SYSTOLIC BLOOD PRESSURE: 108 MMHG | HEIGHT: 62 IN | WEIGHT: 172 LBS | HEART RATE: 98 BPM | BODY MASS INDEX: 31.65 KG/M2 | DIASTOLIC BLOOD PRESSURE: 76 MMHG | TEMPERATURE: 98.6 F

## 2025-05-21 DIAGNOSIS — E55.9 VITAMIN D DEFICIENCY: ICD-10-CM

## 2025-05-21 DIAGNOSIS — J32.9 RECURRENT SINUS INFECTIONS: Primary | ICD-10-CM

## 2025-05-21 DIAGNOSIS — Z00.00 ANNUAL PHYSICAL EXAM: ICD-10-CM

## 2025-05-21 DIAGNOSIS — E28.2 PCOS (POLYCYSTIC OVARIAN SYNDROME): ICD-10-CM

## 2025-05-21 PROCEDURE — 99214 OFFICE O/P EST MOD 30 MIN: CPT | Performed by: FAMILY MEDICINE

## 2025-05-21 RX ORDER — PREDNISONE 10 MG/1
TABLET ORAL
Qty: 18 TABLET | Refills: 0 | Status: SHIPPED | OUTPATIENT
Start: 2025-05-21 | End: 2025-05-31

## 2025-05-21 NOTE — PROGRESS NOTES
[place] [address]  Phone: [phone]  Fax: [phone]  Email: [email or web address]      Encounter Info  Tamela Martinez; Established patient 30 y.o.female; seen 5/21/2025 for: Fatigue, Headache, Cough, and Sore Throat      Assessment & Plan    1. Recurrent sinus infections  Assessment & Plan:  Problem and/or Symptoms are currently not stable and/or well controlled on current treatment plan. Will have patient follow up as directed and make the following changes for further evaluation and/or treatment:     Augmentin BID X 10 D along with low dose steroid taper X 10 D for further Sx relief  Orders:  -     amoxicillin-clavulanate (AUGMENTIN) 875-125 MG per tablet; Take 1 tablet by mouth 2 times daily for 10 days, Disp-20 tablet, R-0Normal  -     predniSONE (DELTASONE) 10 MG tablet; 3 pills daily X 3 days, then 2 pills daily X 3 days, then 1 pills daily X 2 days, then 1/2 pill daily X 2 days, then stop, Disp-18 tablet, R-0Normal      Check Out Instructions  Return in about 2 months (around 8/1/2025) for IP: Annual Exam, Previsit Labs (add Vitals if Virtual).      Subjective & Objective    HPI  Pt feeling sick for the past few days with sore throat, sneezing, sinus pressure/pain, HA, congestion, drainage, ear fullness, cough, etc.     Review of Systems    Physical Exam  Constitutional:       Appearance: She is ill-appearing.   HENT:      Nose: Mucosal edema and congestion present.      Right Sinus: Maxillary sinus tenderness and frontal sinus tenderness present.      Left Sinus: Maxillary sinus tenderness and frontal sinus tenderness present.      Mouth/Throat:      Pharynx: Pharyngeal swelling, posterior oropharyngeal erythema and postnasal drip present.   Pulmonary:      Effort: Pulmonary effort is normal.      Breath sounds: Normal breath sounds.   Lymphadenopathy:      Cervical: Cervical adenopathy present.       Vitals:    05/21/25 1148   BP: 108/76   BP

## 2025-05-21 NOTE — ASSESSMENT & PLAN NOTE
Problem and/or Symptoms are currently not stable and/or well controlled on current treatment plan. Will have patient follow up as directed and make the following changes for further evaluation and/or treatment:     Augmentin BID X 10 D along with low dose steroid taper X 10 D for further Sx relief

## 2025-05-22 LAB
C TRACH RRNA CVX QL NAA+PROBE: NEGATIVE
COLLECTION METHOD: NORMAL
CYTOLOGIST CVX/VAG CYTO: NORMAL
CYTOLOGY CVX/VAG DOC THIN PREP: NORMAL
DATE OF LMP: NORMAL
HPV APTIMA: NEGATIVE
HPV GENOTYPE REFLEX: NORMAL
Lab: NORMAL
Lab: NORMAL
N GONORRHOEA RRNA CVX QL NAA+PROBE: NEGATIVE
PAP SOURCE: NORMAL
PATH REPORT.FINAL DX SPEC: NORMAL
PREV TREATMENT: NORMAL
STAT OF ADQ CVX/VAG CYTO-IMP: NORMAL
T VAGINALIS RRNA SPEC QL NAA+PROBE: NEGATIVE

## 2025-07-14 ENCOUNTER — OFFICE VISIT (OUTPATIENT)
Dept: FAMILY MEDICINE CLINIC | Facility: CLINIC | Age: 30
End: 2025-07-14
Payer: COMMERCIAL

## 2025-07-14 VITALS
WEIGHT: 172 LBS | BODY MASS INDEX: 31.65 KG/M2 | SYSTOLIC BLOOD PRESSURE: 121 MMHG | HEIGHT: 62 IN | DIASTOLIC BLOOD PRESSURE: 67 MMHG | HEART RATE: 88 BPM

## 2025-07-14 DIAGNOSIS — G43.101 MIGRAINE WITH AURA AND WITH STATUS MIGRAINOSUS, NOT INTRACTABLE: Primary | ICD-10-CM

## 2025-07-14 DIAGNOSIS — R11.0 NAUSEA: ICD-10-CM

## 2025-07-14 DIAGNOSIS — R29.898 TIGHTNESS OF NECK: ICD-10-CM

## 2025-07-14 PROCEDURE — 99214 OFFICE O/P EST MOD 30 MIN: CPT | Performed by: FAMILY MEDICINE

## 2025-07-14 RX ORDER — SUMATRIPTAN SUCCINATE 100 MG/1
100 TABLET ORAL
Qty: 15 TABLET | Refills: 1 | Status: SHIPPED | OUTPATIENT
Start: 2025-07-14 | End: 2025-07-14 | Stop reason: SDUPTHER

## 2025-07-14 RX ORDER — SUMATRIPTAN SUCCINATE 100 MG/1
100 TABLET ORAL
Qty: 15 TABLET | Refills: 1 | Status: SHIPPED | OUTPATIENT
Start: 2025-07-14

## 2025-07-14 RX ORDER — CYCLOBENZAPRINE HCL 10 MG
10 TABLET ORAL 3 TIMES DAILY PRN
Qty: 21 TABLET | Refills: 0 | Status: SHIPPED | OUTPATIENT
Start: 2025-07-14 | End: 2025-07-24

## 2025-07-14 RX ORDER — PROMETHAZINE HYDROCHLORIDE 25 MG/1
25 TABLET ORAL 4 TIMES DAILY PRN
Qty: 12 TABLET | Refills: 0 | Status: SHIPPED | OUTPATIENT
Start: 2025-07-14 | End: 2025-07-17

## 2025-07-14 ASSESSMENT — ENCOUNTER SYMPTOMS
BLOOD IN STOOL: 0
ABDOMINAL PAIN: 0
SHORTNESS OF BREATH: 0
CHEST TIGHTNESS: 0

## 2025-07-14 ASSESSMENT — PATIENT HEALTH QUESTIONNAIRE - PHQ9
SUM OF ALL RESPONSES TO PHQ QUESTIONS 1-9: 0
SUM OF ALL RESPONSES TO PHQ QUESTIONS 1-9: 0
1. LITTLE INTEREST OR PLEASURE IN DOING THINGS: NOT AT ALL
SUM OF ALL RESPONSES TO PHQ QUESTIONS 1-9: 0
SUM OF ALL RESPONSES TO PHQ QUESTIONS 1-9: 0
2. FEELING DOWN, DEPRESSED OR HOPELESS: NOT AT ALL

## 2025-07-14 NOTE — PROGRESS NOTES
[facility]  _______________________________________  Herbie Ley MD, SHANNON Cee NP, SHANNON Haley MD    [address] [place] [address]  Phone: [phone]  Fax: [phone]      Tamela Martinez (:  1995) is a 30 y.o. female,Established patient, here for evaluation of the following chief complaint(s):  Headache ( Wednesday, taking tynelol which helps some but the headache always comes back )         A&P:  1. Migraine with aura and with status migrainosus, not intractable  Will try her on Imitrex to abort HA.   - SUMAtriptan (IMITREX) 100 MG tablet; Take 1 tablet by mouth once as needed for Migraine  Dispense: 15 tablet; Refill: 1    2. Tightness of neck  Flexeril to help relax the neck.   - cyclobenzaprine (FLEXERIL) 10 MG tablet; Take 1 tablet by mouth 3 times daily as needed for Muscle spasms  Dispense: 21 tablet; Refill: 0    3. Nausea  Phenergan for nausea and to help her sleep.   - promethazine (PHENERGAN) 25 MG tablet; Take 1 tablet by mouth 4 times daily as needed for Nausea Do not drive after taking.  Dispense: 12 tablet; Refill: 0    Attempting to break her HA and get her some sleep. Will have her FU with PCP for recheck and consideration of prophylactic meds.     No follow-ups on file.       Subjective     Pt with HA here with HA that will not stop, having rebound off APAP apparently. PCP treated her in 2024 for tension type HA with inderal and fioricet.     She has unilateral HA that will sometimes flip to the opposite side. Has been having issues with bright lights and loud noises causing her HA to get worse. APAP will work a little but will not keep HA down. LMP was a week ago. Missed Work Friday and today. States she will have intermittent blurred vision as well. States HA have been more frequent for the last few months.     BP Readings from Last 3 Encounters:   25 121/67   25 108/76   05/15/25

## 2025-07-16 ENCOUNTER — APPOINTMENT (OUTPATIENT)
Dept: CT IMAGING | Age: 30
End: 2025-07-16
Payer: COMMERCIAL

## 2025-07-16 ENCOUNTER — HOSPITAL ENCOUNTER (EMERGENCY)
Age: 30
Discharge: HOME | End: 2025-07-16
Attending: EMERGENCY MEDICINE
Payer: COMMERCIAL

## 2025-07-16 VITALS
OXYGEN SATURATION: 99 % | SYSTOLIC BLOOD PRESSURE: 94 MMHG | RESPIRATION RATE: 17 BRPM | TEMPERATURE: 98.1 F | WEIGHT: 165 LBS | DIASTOLIC BLOOD PRESSURE: 66 MMHG | BODY MASS INDEX: 30.36 KG/M2 | HEART RATE: 76 BPM | HEIGHT: 62 IN

## 2025-07-16 DIAGNOSIS — G43.019 INTRACTABLE MIGRAINE WITHOUT AURA AND WITHOUT STATUS MIGRAINOSUS: Primary | ICD-10-CM

## 2025-07-16 LAB
ANION GAP SERPL CALC-SCNC: 14 MMOL/L (ref 7–16)
BASOPHILS # BLD: 0.05 K/UL (ref 0–0.2)
BASOPHILS NFR BLD: 0.5 % (ref 0–2)
BUN SERPL-MCNC: 10 MG/DL (ref 6–23)
CALCIUM SERPL-MCNC: 9.5 MG/DL (ref 8.8–10.2)
CHLORIDE SERPL-SCNC: 106 MMOL/L (ref 98–107)
CO2 SERPL-SCNC: 22 MMOL/L (ref 20–29)
CREAT SERPL-MCNC: 1.07 MG/DL (ref 0.8–1.3)
CRP SERPL-MCNC: <0.3 MG/DL (ref 0–0.9)
DIFFERENTIAL METHOD BLD: ABNORMAL
EOSINOPHIL # BLD: 0.05 K/UL (ref 0–0.8)
EOSINOPHIL NFR BLD: 0.5 % (ref 0.5–7.8)
ERYTHROCYTE [DISTWIDTH] IN BLOOD BY AUTOMATED COUNT: 17.4 % (ref 11.9–14.6)
GLUCOSE SERPL-MCNC: 113 MG/DL (ref 65–100)
HCT VFR BLD AUTO: 38 % (ref 35.8–46.3)
HGB BLD-MCNC: 12.6 G/DL (ref 11.7–15.4)
IMM GRANULOCYTES # BLD AUTO: 0.03 K/UL (ref 0–0.5)
IMM GRANULOCYTES NFR BLD AUTO: 0.3 % (ref 0–5)
LYMPHOCYTES # BLD: 2.41 K/UL (ref 0.5–4.6)
LYMPHOCYTES NFR BLD: 25.4 % (ref 13–44)
MCH RBC QN AUTO: 26 PG (ref 26.1–32.9)
MCHC RBC AUTO-ENTMCNC: 33.2 G/DL (ref 31.4–35)
MCV RBC AUTO: 78.5 FL (ref 82–102)
MONOCYTES # BLD: 0.44 K/UL (ref 0.1–1.3)
MONOCYTES NFR BLD: 4.6 % (ref 4–12)
NEUTS SEG # BLD: 6.51 K/UL (ref 1.7–8.2)
NEUTS SEG NFR BLD: 68.7 % (ref 43–78)
NRBC # BLD: 0 K/UL (ref 0–0.2)
PLATELET # BLD AUTO: 405 K/UL (ref 150–450)
PMV BLD AUTO: 10.1 FL (ref 9.4–12.3)
POTASSIUM SERPL-SCNC: 4.1 MMOL/L (ref 3.5–5.1)
RBC # BLD AUTO: 4.84 M/UL (ref 4.05–5.2)
SODIUM SERPL-SCNC: 142 MMOL/L (ref 133–143)
WBC # BLD AUTO: 9.5 K/UL (ref 4.3–11.1)

## 2025-07-16 PROCEDURE — 80048 BASIC METABOLIC PNL TOTAL CA: CPT

## 2025-07-16 PROCEDURE — 86140 C-REACTIVE PROTEIN: CPT

## 2025-07-16 PROCEDURE — 2580000003 HC RX 258: Performed by: EMERGENCY MEDICINE

## 2025-07-16 PROCEDURE — 96375 TX/PRO/DX INJ NEW DRUG ADDON: CPT

## 2025-07-16 PROCEDURE — 6360000002 HC RX W HCPCS: Performed by: EMERGENCY MEDICINE

## 2025-07-16 PROCEDURE — 96374 THER/PROPH/DIAG INJ IV PUSH: CPT

## 2025-07-16 PROCEDURE — 99284 EMERGENCY DEPT VISIT MOD MDM: CPT

## 2025-07-16 PROCEDURE — 85025 COMPLETE CBC W/AUTO DIFF WBC: CPT

## 2025-07-16 PROCEDURE — 2500000003 HC RX 250 WO HCPCS: Performed by: EMERGENCY MEDICINE

## 2025-07-16 PROCEDURE — 70450 CT HEAD/BRAIN W/O DYE: CPT

## 2025-07-16 RX ORDER — NALBUPHINE HYDROCHLORIDE 10 MG/ML
5 INJECTION INTRAMUSCULAR; INTRAVENOUS; SUBCUTANEOUS
Status: COMPLETED | OUTPATIENT
Start: 2025-07-16 | End: 2025-07-16

## 2025-07-16 RX ORDER — DIPHENHYDRAMINE HYDROCHLORIDE 50 MG/ML
12.5 INJECTION, SOLUTION INTRAMUSCULAR; INTRAVENOUS
Status: COMPLETED | OUTPATIENT
Start: 2025-07-16 | End: 2025-07-16

## 2025-07-16 RX ORDER — 0.9 % SODIUM CHLORIDE 0.9 %
1000 INTRAVENOUS SOLUTION INTRAVENOUS ONCE
Status: COMPLETED | OUTPATIENT
Start: 2025-07-16 | End: 2025-07-16

## 2025-07-16 RX ORDER — KETOROLAC TROMETHAMINE 30 MG/ML
30 INJECTION, SOLUTION INTRAMUSCULAR; INTRAVENOUS
Status: COMPLETED | OUTPATIENT
Start: 2025-07-16 | End: 2025-07-16

## 2025-07-16 RX ORDER — METOCLOPRAMIDE HYDROCHLORIDE 5 MG/ML
10 INJECTION INTRAMUSCULAR; INTRAVENOUS
Status: COMPLETED | OUTPATIENT
Start: 2025-07-16 | End: 2025-07-16

## 2025-07-16 RX ADMIN — METHYLPREDNISOLONE SODIUM SUCCINATE 125 MG: 125 INJECTION INTRAMUSCULAR; INTRAVENOUS at 11:23

## 2025-07-16 RX ADMIN — NALBUPHINE HYDROCHLORIDE 5 MG: 10 INJECTION, SOLUTION INTRAMUSCULAR; INTRAVENOUS; SUBCUTANEOUS at 11:25

## 2025-07-16 RX ADMIN — KETOROLAC TROMETHAMINE 30 MG: 30 INJECTION, SOLUTION INTRAMUSCULAR at 12:08

## 2025-07-16 RX ADMIN — DIPHENHYDRAMINE HYDROCHLORIDE 12.5 MG: 50 INJECTION INTRAMUSCULAR; INTRAVENOUS at 11:20

## 2025-07-16 RX ADMIN — METOCLOPRAMIDE 10 MG: 5 INJECTION, SOLUTION INTRAMUSCULAR; INTRAVENOUS at 11:30

## 2025-07-16 RX ADMIN — SODIUM CHLORIDE 1000 ML: 0.9 INJECTION, SOLUTION INTRAVENOUS at 11:28

## 2025-07-16 ASSESSMENT — PAIN DESCRIPTION - PAIN TYPE: TYPE: ACUTE PAIN

## 2025-07-16 ASSESSMENT — PAIN DESCRIPTION - DESCRIPTORS: DESCRIPTORS: POUNDING;PRESSURE

## 2025-07-16 ASSESSMENT — PAIN DESCRIPTION - FREQUENCY: FREQUENCY: CONTINUOUS

## 2025-07-16 ASSESSMENT — PAIN SCALES - GENERAL
PAINLEVEL_OUTOF10: 7
PAINLEVEL_OUTOF10: 4
PAINLEVEL_OUTOF10: 7
PAINLEVEL_OUTOF10: 1

## 2025-07-16 ASSESSMENT — LIFESTYLE VARIABLES
HOW OFTEN DO YOU HAVE A DRINK CONTAINING ALCOHOL: MONTHLY OR LESS
HOW MANY STANDARD DRINKS CONTAINING ALCOHOL DO YOU HAVE ON A TYPICAL DAY: 1 OR 2

## 2025-07-16 ASSESSMENT — ENCOUNTER SYMPTOMS: NAUSEA: 1

## 2025-07-16 ASSESSMENT — PAIN - FUNCTIONAL ASSESSMENT: PAIN_FUNCTIONAL_ASSESSMENT: 0-10

## 2025-07-16 ASSESSMENT — PAIN DESCRIPTION - ONSET: ONSET: PROGRESSIVE

## 2025-07-16 NOTE — ED TRIAGE NOTES
Pt to the ED with a steady gait with c/o of a headache that started last Wed. Pt states that she was seen at her PCP on Monday and was given some medications and they have not helped. Pt states that she often gets migraines, but not to this extent. Pt states that she has nausea and light sensitivity.

## 2025-07-16 NOTE — ED NOTES
Patient mobility status ambulates with no difficulty.     I have reviewed discharge instructions with the patient.  The patient verbalized understanding.    Patient left ED via Discharge Method: ambulatory to Home with Mother.    Opportunity for questions and clarification provided.     Patient given 0 scripts.

## 2025-07-16 NOTE — ED PROVIDER NOTES
Emergency Department Provider Note       PCP: Nilson Rosen MD   Age: 30 y.o.   Sex: female     DISPOSITION Decision To Discharge 07/16/2025 12:34:24 PM   DISPOSITION CONDITION Stable            ICD-10-CM    1. Intractable migraine without aura and without status migrainosus  G43.019           Medical Decision Making     No clinically significant abnormalities noted in the blood work.  Head CT shows no acute process.  Patient reports symptomatic improvement of the headache now 4/10 in intensity.  Will give additional analgesic and recheck.    Recheck at 12:33 PM and the patient reports complete resolution of the headache.  Stable for discharge.     1 acute complicated illness or injury.  Shared medical decision making was utilized in creating the patients health plan today.    I independently ordered and reviewed each unique test.                     History     Patient with past medical history seen for migraines as well as PCOS presents emerged from complaining of atypical and severe headache.  Onset of symptoms was several days ago when she was seen by her primary care provider 2 days ago.  She states she was given injections and some prescriptions but the headache persists.  The headache is different from previous migraines with complaints of some intermittent blurry vision and photosensitivity and currently right sided head pain.  She has had some nausea but no vomiting.  She denies any lateralizing neurological symptoms in the extremities or disequilibrium.  She states the headache has been moving from side-to-side as well.  She denies any fever or chills or known sick contacts and review of systems is otherwise negative    The history is provided by the patient and medical records.       ROS     Review of Systems   Constitutional:  Negative for chills and fever.   Eyes:  Positive for visual disturbance.   Gastrointestinal:  Positive for nausea.   Neurological:  Positive for headaches.   All other

## (undated) DEVICE — AMD ANTIMICROBIAL BANDAGE ROLL,6 PLY: Brand: KERLIX

## (undated) DEVICE — SUT ETHLN 3-0 18IN PS1 BLK --

## (undated) DEVICE — PADDING CAST W4INXL4YD ST COT COHESIVE HND TEARABLE SPEC

## (undated) DEVICE — KIT INSTR W/ 2.4MM GUIDEPIN SUT PASS WIRE NO2 FIBERWIRE

## (undated) DEVICE — SUT ETHLN 3-0 18IN PS2 BLK --

## (undated) DEVICE — SPONGE GZ W4XL4IN COT 12 PLY TYP VII WVN C FLD DSGN

## (undated) DEVICE — FOOT DR TOLLISON & WOMACK: Brand: MEDLINE INDUSTRIES, INC.

## (undated) DEVICE — SUTURE VCRL SZ 2-0 L36IN ABSRB UD L36MM CT-1 1/2 CIR J945H

## (undated) DEVICE — BNDG,ELSTC,MATRIX,STRL,4"X5YD,LF,HOOK&LP: Brand: MEDLINE

## (undated) DEVICE — BLADE SURG NO15 S STL STR DISP GLASSVAN

## (undated) DEVICE — BANDAGE COBAN 6 IN WND 6INX5YD FOAM

## (undated) DEVICE — PAD,ABDOMINAL,5"X9",ST,LF,25/BX: Brand: MEDLINE INDUSTRIES, INC.

## (undated) DEVICE — REM POLYHESIVE ADULT PATIENT RETURN ELECTRODE: Brand: VALLEYLAB

## (undated) DEVICE — BNDG ELAS ESMARK 4INX12FT LF -- STRL

## (undated) DEVICE — FOOT & ANKLE SOFT DR WOMACK: Brand: MEDLINE INDUSTRIES, INC.

## (undated) DEVICE — DRILL 4MM FOR 4.5MM ANCHOR SL-PLUS

## (undated) DEVICE — SOLUTION IV 1000ML 0.9% SOD CHL

## (undated) DEVICE — DRSG GZ OIL EMUL CURAD 3X8 --

## (undated) DEVICE — ZIMMER® STERILE DISPOSABLE TOURNIQUET CUFF WITH PLC, DUAL PORT, SINGLE BLADDER, 18 IN. (46 CM)

## (undated) DEVICE — BUTTON SWITCH PENCIL BLADE ELECTRODE, HOLSTER: Brand: EDGE

## (undated) DEVICE — ZIMMER® STERILE DISPOSABLE TOURNIQUET CUFF WITH PLC, DUAL PORT, SINGLE BLADDER, 30 IN. (76 CM)

## (undated) DEVICE — (D)PREP SKN CHLRAPRP APPL 26ML -- CONVERT TO ITEM 371833

## (undated) DEVICE — SPLINT CAST W4XL30IN WHT THMB FBRGLS PRECUT INTLOK WRINKLE

## (undated) DEVICE — AMD ANTIMICROBIAL GAUZE SPONGES,12 PLY USP TYPE VII, 0.2% POLYHEXAMETHYLENE BIGUANIDE HCI (PHMB): Brand: CURITY

## (undated) DEVICE — DRAPE C ARM W54XL84IN MINI FOR OEC 6800

## (undated) DEVICE — SUTURE MCRYL SZ 3-0 L27IN ABSRB UD L19MM PS-2 3/8 CIR PRIM Y427H

## (undated) DEVICE — PRECISION THIN (13.0 X 0.38 X 34.5MM)

## (undated) DEVICE — GUIDEWIRE ORTH L12IN DIA2.4MM NTHRD TRCR TIP DISP FOR 5TH

## (undated) DEVICE — GOWN,REINF,POLY,ECL,PP SLV,XL: Brand: MEDLINE

## (undated) DEVICE — 2000CC GUARDIAN II: Brand: GUARDIAN

## (undated) DEVICE — BANDAGE,GAUZE,CONFORMING,4"X75",STRL,LF: Brand: MEDLINE

## (undated) DEVICE — BANDAGE,GAUZE,BULKEE II,4.5"X4.1YD,STRL: Brand: MEDLINE

## (undated) DEVICE — SUTURE VCRL SZ 2-0 L27IN ABSRB UD L26MM CT-2 1/2 CIR J269H

## (undated) DEVICE — NON-ADHERING DRESSING: Brand: ADAPTIC®

## (undated) DEVICE — PREP SKN CHLRAPRP APL 26ML STR --

## (undated) DEVICE — CARDINAL HEALTH FLEXIBLE LIGHT HANDLE COVER: Brand: CARDINAL HEALTH

## (undated) DEVICE — STERILE HOOK LOCK LATEX FREE ELASTIC BANDAGE 6INX5YD: Brand: HOOK LOCK™

## (undated) DEVICE — CURITY NON-ADHERENT STRIPS: Brand: CURITY